# Patient Record
Sex: FEMALE | Race: WHITE | NOT HISPANIC OR LATINO | Employment: OTHER | URBAN - METROPOLITAN AREA
[De-identification: names, ages, dates, MRNs, and addresses within clinical notes are randomized per-mention and may not be internally consistent; named-entity substitution may affect disease eponyms.]

---

## 2017-02-04 ENCOUNTER — OFFICE VISIT - HEALTHEAST (OUTPATIENT)
Dept: FAMILY MEDICINE | Facility: CLINIC | Age: 67
End: 2017-02-04

## 2017-02-04 DIAGNOSIS — J01.90 ACUTE SINUSITIS: ICD-10-CM

## 2017-04-25 ENCOUNTER — COMMUNICATION - HEALTHEAST (OUTPATIENT)
Dept: INTERNAL MEDICINE | Facility: CLINIC | Age: 67
End: 2017-04-25

## 2017-05-22 ENCOUNTER — RECORDS - HEALTHEAST (OUTPATIENT)
Dept: ADMINISTRATIVE | Facility: OTHER | Age: 67
End: 2017-05-22

## 2017-05-30 ENCOUNTER — COMMUNICATION - HEALTHEAST (OUTPATIENT)
Dept: INTERNAL MEDICINE | Facility: CLINIC | Age: 67
End: 2017-05-30

## 2017-05-31 ENCOUNTER — RECORDS - HEALTHEAST (OUTPATIENT)
Dept: ADMINISTRATIVE | Facility: OTHER | Age: 67
End: 2017-05-31

## 2017-05-31 ENCOUNTER — OFFICE VISIT - HEALTHEAST (OUTPATIENT)
Dept: INTERNAL MEDICINE | Facility: CLINIC | Age: 67
End: 2017-05-31
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
## 2017-05-31 ASSESSMENT — MIFFLIN-ST. JEOR: SCORE: 970.87

## 2017-06-05 ENCOUNTER — AMBULATORY - HEALTHEAST (OUTPATIENT)
Dept: INTERNAL MEDICINE | Facility: CLINIC | Age: 67
End: 2017-06-05

## 2017-06-05 ENCOUNTER — COMMUNICATION - HEALTHEAST (OUTPATIENT)
Dept: SCHEDULING | Facility: CLINIC | Age: 67
End: 2017-06-05

## 2017-06-05 ENCOUNTER — COMMUNICATION - HEALTHEAST (OUTPATIENT)
Dept: INTERNAL MEDICINE | Facility: CLINIC | Age: 67
End: 2017-06-05

## 2017-07-03 ENCOUNTER — OFFICE VISIT - HEALTHEAST (OUTPATIENT)
Dept: ONCOLOGY | Facility: HOSPITAL | Age: 67
End: 2017-07-03

## 2017-07-03 DIAGNOSIS — C50.519 MALIGNANT NEOPLASM OF LOWER-OUTER QUADRANT OF FEMALE BREAST (H): ICD-10-CM

## 2017-07-03 RX ORDER — GLUCOSAMINE HCL/CHONDROITIN SU 500-400 MG
CAPSULE ORAL
Status: SHIPPED | COMMUNITY
Start: 2017-05-17

## 2017-08-02 ENCOUNTER — OFFICE VISIT - HEALTHEAST (OUTPATIENT)
Dept: INTERNAL MEDICINE | Facility: CLINIC | Age: 67
End: 2017-08-02

## 2017-08-02 DIAGNOSIS — L84 CALLUS OF FOOT: ICD-10-CM

## 2017-08-02 DIAGNOSIS — E10.9 DIABETES MELLITUS TYPE 1 (H): ICD-10-CM

## 2017-08-02 DIAGNOSIS — M79.601 BILATERAL ARM PAIN: ICD-10-CM

## 2017-08-02 DIAGNOSIS — M79.602 BILATERAL ARM PAIN: ICD-10-CM

## 2017-08-02 ASSESSMENT — MIFFLIN-ST. JEOR: SCORE: 975.41

## 2017-08-29 ENCOUNTER — OFFICE VISIT - HEALTHEAST (OUTPATIENT)
Dept: PODIATRY | Age: 67
End: 2017-08-29

## 2017-08-29 DIAGNOSIS — L57.0 KERATOMA: ICD-10-CM

## 2017-09-05 ENCOUNTER — RECORDS - HEALTHEAST (OUTPATIENT)
Dept: ADMINISTRATIVE | Facility: OTHER | Age: 67
End: 2017-09-05

## 2017-09-11 ENCOUNTER — RECORDS - HEALTHEAST (OUTPATIENT)
Dept: ADMINISTRATIVE | Facility: OTHER | Age: 67
End: 2017-09-11

## 2017-09-12 ENCOUNTER — COMMUNICATION - HEALTHEAST (OUTPATIENT)
Dept: INTERNAL MEDICINE | Facility: CLINIC | Age: 67
End: 2017-09-12

## 2017-09-12 ENCOUNTER — AMBULATORY - HEALTHEAST (OUTPATIENT)
Dept: CARDIOLOGY | Facility: CLINIC | Age: 67
End: 2017-09-12

## 2017-09-12 DIAGNOSIS — I25.10 CAD (CORONARY ARTERY DISEASE): ICD-10-CM

## 2017-09-27 ENCOUNTER — OFFICE VISIT - HEALTHEAST (OUTPATIENT)
Dept: INTERNAL MEDICINE | Facility: CLINIC | Age: 67
End: 2017-09-27

## 2017-09-27 DIAGNOSIS — I25.10 ATHEROSCLEROSIS OF NATIVE CORONARY ARTERY OF NATIVE HEART WITHOUT ANGINA PECTORIS: ICD-10-CM

## 2017-09-27 DIAGNOSIS — Z00.00 ROUTINE GENERAL MEDICAL EXAMINATION AT A HEALTH CARE FACILITY: ICD-10-CM

## 2017-09-27 DIAGNOSIS — C50.911 BREAST CANCER, RIGHT BREAST (H): ICD-10-CM

## 2017-09-27 DIAGNOSIS — Z23 NEED FOR VACCINATION: ICD-10-CM

## 2017-09-27 DIAGNOSIS — E11.9 DIABETES MELLITUS TYPE 2 IN NONOBESE (H): ICD-10-CM

## 2017-09-27 DIAGNOSIS — E10.9 TYPE 1 DIABETES MELLITUS WITHOUT COMPLICATION (H): ICD-10-CM

## 2017-09-27 DIAGNOSIS — E78.2 MIXED HYPERLIPIDEMIA: ICD-10-CM

## 2017-09-27 DIAGNOSIS — Z79.899 MEDICATION MANAGEMENT: ICD-10-CM

## 2017-09-27 DIAGNOSIS — I10 ESSENTIAL HYPERTENSION WITH GOAL BLOOD PRESSURE LESS THAN 130/85: ICD-10-CM

## 2017-09-27 DIAGNOSIS — M85.80 OSTEOPENIA: ICD-10-CM

## 2017-09-27 LAB
CHOLEST SERPL-MCNC: 143 MG/DL
FASTING STATUS PATIENT QL REPORTED: YES
HDLC SERPL-MCNC: 80 MG/DL
LDLC SERPL CALC-MCNC: 57 MG/DL
TRIGL SERPL-MCNC: 32 MG/DL

## 2017-09-27 ASSESSMENT — MIFFLIN-ST. JEOR: SCORE: 975.41

## 2017-10-02 ENCOUNTER — OFFICE VISIT - HEALTHEAST (OUTPATIENT)
Dept: PODIATRY | Facility: CLINIC | Age: 67
End: 2017-10-02

## 2017-10-02 DIAGNOSIS — L57.0 KERATOMA: ICD-10-CM

## 2017-10-02 DIAGNOSIS — M21.6X1 PLANTAR FLEXED METATARSAL BONE OF RIGHT FOOT: ICD-10-CM

## 2017-10-06 ENCOUNTER — COMMUNICATION - HEALTHEAST (OUTPATIENT)
Dept: ADMINISTRATIVE | Facility: CLINIC | Age: 67
End: 2017-10-06

## 2017-10-10 ENCOUNTER — AMBULATORY - HEALTHEAST (OUTPATIENT)
Dept: OTHER | Facility: CLINIC | Age: 67
End: 2017-10-10

## 2017-10-25 ENCOUNTER — AMBULATORY - HEALTHEAST (OUTPATIENT)
Dept: OTHER | Facility: CLINIC | Age: 67
End: 2017-10-25

## 2017-11-02 ENCOUNTER — HOSPITAL ENCOUNTER (OUTPATIENT)
Dept: NUCLEAR MEDICINE | Facility: HOSPITAL | Age: 67
Discharge: HOME | End: 2017-11-02
Attending: INTERNAL MEDICINE

## 2017-11-02 ENCOUNTER — HOSPITAL ENCOUNTER (OUTPATIENT)
Dept: CARDIOLOGY | Facility: HOSPITAL | Age: 67
Discharge: HOME | End: 2017-11-02
Attending: INTERNAL MEDICINE

## 2017-11-02 DIAGNOSIS — I25.10 CAD (CORONARY ARTERY DISEASE): ICD-10-CM

## 2017-11-02 LAB
CV STRESS CURRENT BP HE: NORMAL
CV STRESS CURRENT HR HE: 101
CV STRESS CURRENT HR HE: 103
CV STRESS CURRENT HR HE: 105
CV STRESS CURRENT HR HE: 105
CV STRESS CURRENT HR HE: 110
CV STRESS CURRENT HR HE: 119
CV STRESS CURRENT HR HE: 122
CV STRESS CURRENT HR HE: 128
CV STRESS CURRENT HR HE: 132
CV STRESS CURRENT HR HE: 133
CV STRESS CURRENT HR HE: 135
CV STRESS CURRENT HR HE: 140
CV STRESS CURRENT HR HE: 142
CV STRESS CURRENT HR HE: 145
CV STRESS CURRENT HR HE: 146
CV STRESS CURRENT HR HE: 60
CV STRESS CURRENT HR HE: 63
CV STRESS CURRENT HR HE: 73
CV STRESS CURRENT HR HE: 74
CV STRESS CURRENT HR HE: 75
CV STRESS CURRENT HR HE: 77
CV STRESS CURRENT HR HE: 78
CV STRESS CURRENT HR HE: 79
CV STRESS CURRENT HR HE: 82
CV STRESS CURRENT HR HE: 85
CV STRESS CURRENT HR HE: 91
CV STRESS CURRENT HR HE: 94
CV STRESS DEVIATION TIME HE: NORMAL
CV STRESS ECHO PERCENT HR HE: NORMAL
CV STRESS EXERCISE STAGE HE: NORMAL
CV STRESS EXERCISE STAGE REACHED HE: NORMAL
CV STRESS FINAL RESTING BP HE: NORMAL
CV STRESS FINAL RESTING HR HE: 73
CV STRESS MAX HR HE: 147
CV STRESS MAX TREADMILL GRADE HE: 16
CV STRESS MAX TREADMILL SPEED HE: 4.2
CV STRESS PEAK DIA BP HE: NORMAL
CV STRESS PEAK SYS BP HE: NORMAL
CV STRESS PHASE HE: NORMAL
CV STRESS PROTOCOL HE: NORMAL
CV STRESS RESTING PT POSITION HE: NORMAL
CV STRESS RESTING PT POSITION HE: NORMAL
CV STRESS ST DEVIATION AMOUNT HE: NORMAL
CV STRESS ST DEVIATION ELEVATION HE: NORMAL
CV STRESS ST EVELATION AMOUNT HE: NORMAL
CV STRESS TEST TYPE HE: NORMAL
CV STRESS TOTAL STAGE TIME MIN 1 HE: NORMAL
NUC STRESS EJECTION FRACTION: 75 %
STRESS ECHO BASELINE BP: NORMAL
STRESS ECHO BASELINE HR: 59
STRESS ECHO CALCULATED PERCENT HR: 94 %
STRESS ECHO LAST STRESS BP: NORMAL
STRESS ECHO LAST STRESS HR: 146
STRESS ECHO POST ESTIMATED WORKLOAD: 12.1
STRESS ECHO POST EXERCISE DUR MIN: 11
STRESS ECHO POST EXERCISE DUR SEC: 20
STRESS ECHO TARGET HR: 133

## 2017-11-09 ENCOUNTER — OFFICE VISIT - HEALTHEAST (OUTPATIENT)
Dept: CARDIOLOGY | Facility: CLINIC | Age: 67
End: 2017-11-09

## 2017-11-09 DIAGNOSIS — I10 ESSENTIAL HYPERTENSION: ICD-10-CM

## 2017-11-09 DIAGNOSIS — I25.10 ATHEROSCLEROSIS OF NATIVE CORONARY ARTERY OF NATIVE HEART WITHOUT ANGINA PECTORIS: ICD-10-CM

## 2017-11-09 ASSESSMENT — MIFFLIN-ST. JEOR: SCORE: 975.41

## 2017-11-22 ENCOUNTER — COMMUNICATION - HEALTHEAST (OUTPATIENT)
Dept: CARDIOLOGY | Facility: CLINIC | Age: 67
End: 2017-11-22

## 2017-12-01 ENCOUNTER — COMMUNICATION - HEALTHEAST (OUTPATIENT)
Dept: INTERNAL MEDICINE | Facility: CLINIC | Age: 67
End: 2017-12-01

## 2017-12-01 DIAGNOSIS — M85.80 OSTEOPENIA: ICD-10-CM

## 2017-12-05 ENCOUNTER — COMMUNICATION - HEALTHEAST (OUTPATIENT)
Dept: INTERNAL MEDICINE | Facility: CLINIC | Age: 67
End: 2017-12-05

## 2017-12-05 DIAGNOSIS — I10 ESSENTIAL HYPERTENSION: ICD-10-CM

## 2018-01-15 ENCOUNTER — OFFICE VISIT - HEALTHEAST (OUTPATIENT)
Dept: ONCOLOGY | Facility: HOSPITAL | Age: 68
End: 2018-01-15

## 2018-01-15 DIAGNOSIS — C50.519 MALIGNANT NEOPLASM OF LOWER-OUTER QUADRANT OF FEMALE BREAST (H): ICD-10-CM

## 2018-01-16 ENCOUNTER — RECORDS - HEALTHEAST (OUTPATIENT)
Dept: ADMINISTRATIVE | Facility: OTHER | Age: 68
End: 2018-01-16

## 2018-02-19 ENCOUNTER — COMMUNICATION - HEALTHEAST (OUTPATIENT)
Dept: INTERNAL MEDICINE | Facility: CLINIC | Age: 68
End: 2018-02-19

## 2018-02-19 DIAGNOSIS — M85.80 OSTEOPENIA: ICD-10-CM

## 2018-02-26 ENCOUNTER — COMMUNICATION - HEALTHEAST (OUTPATIENT)
Dept: INTERNAL MEDICINE | Facility: CLINIC | Age: 68
End: 2018-02-26

## 2018-05-14 ENCOUNTER — COMMUNICATION - HEALTHEAST (OUTPATIENT)
Dept: INTERNAL MEDICINE | Facility: CLINIC | Age: 68
End: 2018-05-14

## 2018-05-14 DIAGNOSIS — M85.80 OSTEOPENIA: ICD-10-CM

## 2018-05-16 ENCOUNTER — RECORDS - HEALTHEAST (OUTPATIENT)
Dept: ADMINISTRATIVE | Facility: OTHER | Age: 68
End: 2018-05-16

## 2018-06-28 ENCOUNTER — RECORDS - HEALTHEAST (OUTPATIENT)
Dept: ADMINISTRATIVE | Facility: OTHER | Age: 68
End: 2018-06-28

## 2018-06-28 ENCOUNTER — RECORDS - HEALTHEAST (OUTPATIENT)
Dept: BONE DENSITY | Facility: CLINIC | Age: 68
End: 2018-06-28

## 2018-06-28 DIAGNOSIS — C50.519: ICD-10-CM

## 2018-07-09 ENCOUNTER — OFFICE VISIT - HEALTHEAST (OUTPATIENT)
Dept: ONCOLOGY | Facility: HOSPITAL | Age: 68
End: 2018-07-09

## 2018-07-09 DIAGNOSIS — C50.519 MALIGNANT NEOPLASM OF LOWER-OUTER QUADRANT OF FEMALE BREAST (H): ICD-10-CM

## 2018-07-17 ENCOUNTER — COMMUNICATION - HEALTHEAST (OUTPATIENT)
Dept: ONCOLOGY | Facility: HOSPITAL | Age: 68
End: 2018-07-17

## 2018-07-17 ENCOUNTER — AMBULATORY - HEALTHEAST (OUTPATIENT)
Dept: ONCOLOGY | Facility: HOSPITAL | Age: 68
End: 2018-07-17

## 2018-07-17 DIAGNOSIS — M81.0 AGE-RELATED OSTEOPOROSIS WITHOUT CURRENT PATHOLOGICAL FRACTURE: ICD-10-CM

## 2018-07-19 ENCOUNTER — AMBULATORY - HEALTHEAST (OUTPATIENT)
Dept: INFUSION THERAPY | Facility: HOSPITAL | Age: 68
End: 2018-07-19

## 2018-07-19 ENCOUNTER — INFUSION - HEALTHEAST (OUTPATIENT)
Dept: INFUSION THERAPY | Facility: HOSPITAL | Age: 68
End: 2018-07-19

## 2018-07-19 DIAGNOSIS — C50.519 MALIGNANT NEOPLASM OF LOWER-OUTER QUADRANT OF FEMALE BREAST (H): ICD-10-CM

## 2018-07-19 DIAGNOSIS — M81.0 AGE-RELATED OSTEOPOROSIS WITHOUT CURRENT PATHOLOGICAL FRACTURE: ICD-10-CM

## 2018-07-19 LAB
ALBUMIN SERPL-MCNC: 4.1 G/DL (ref 3.5–5)
ALP SERPL-CCNC: 48 U/L (ref 45–120)
ALT SERPL W P-5'-P-CCNC: 18 U/L (ref 0–45)
ANION GAP SERPL CALCULATED.3IONS-SCNC: 7 MMOL/L (ref 5–18)
ANION GAP SERPL CALCULATED.3IONS-SCNC: 7 MMOL/L (ref 5–18)
AST SERPL W P-5'-P-CCNC: 30 U/L (ref 0–40)
BILIRUB SERPL-MCNC: 0.8 MG/DL (ref 0–1)
BUN SERPL-MCNC: 13 MG/DL (ref 8–22)
BUN SERPL-MCNC: 13 MG/DL (ref 8–22)
CALCIUM SERPL-MCNC: 9.7 MG/DL (ref 8.5–10.5)
CALCIUM SERPL-MCNC: 9.7 MG/DL (ref 8.5–10.5)
CHLORIDE BLD-SCNC: 102 MMOL/L (ref 98–107)
CHLORIDE BLD-SCNC: 102 MMOL/L (ref 98–107)
CO2 SERPL-SCNC: 31 MMOL/L (ref 22–31)
CO2 SERPL-SCNC: 31 MMOL/L (ref 22–31)
CREAT SERPL-MCNC: 0.7 MG/DL (ref 0.6–1.1)
CREAT SERPL-MCNC: 0.7 MG/DL (ref 0.6–1.1)
GFR SERPL CREATININE-BSD FRML MDRD: >60 ML/MIN/1.73M2
GFR SERPL CREATININE-BSD FRML MDRD: >60 ML/MIN/1.73M2
GLUCOSE BLD-MCNC: 86 MG/DL (ref 70–125)
GLUCOSE BLD-MCNC: 86 MG/DL (ref 70–125)
POTASSIUM BLD-SCNC: 3.9 MMOL/L (ref 3.5–5)
POTASSIUM BLD-SCNC: 3.9 MMOL/L (ref 3.5–5)
PROT SERPL-MCNC: 6.9 G/DL (ref 6–8)
SODIUM SERPL-SCNC: 140 MMOL/L (ref 136–145)
SODIUM SERPL-SCNC: 140 MMOL/L (ref 136–145)

## 2018-08-06 ENCOUNTER — COMMUNICATION - HEALTHEAST (OUTPATIENT)
Dept: INTERNAL MEDICINE | Facility: CLINIC | Age: 68
End: 2018-08-06

## 2018-08-06 DIAGNOSIS — M85.80 OSTEOPENIA: ICD-10-CM

## 2018-08-15 ENCOUNTER — RECORDS - HEALTHEAST (OUTPATIENT)
Dept: ADMINISTRATIVE | Facility: OTHER | Age: 68
End: 2018-08-15

## 2018-09-21 ENCOUNTER — AMBULATORY - HEALTHEAST (OUTPATIENT)
Dept: INTERNAL MEDICINE | Facility: CLINIC | Age: 68
End: 2018-09-21

## 2018-09-24 ENCOUNTER — OFFICE VISIT - HEALTHEAST (OUTPATIENT)
Dept: INTERNAL MEDICINE | Facility: CLINIC | Age: 68
End: 2018-09-24

## 2018-09-24 DIAGNOSIS — Z23 NEED FOR VACCINATION: ICD-10-CM

## 2018-09-24 DIAGNOSIS — Z95.5 S/P CORONARY ARTERY STENT PLACEMENT: ICD-10-CM

## 2018-09-24 DIAGNOSIS — M81.0 AGE-RELATED OSTEOPOROSIS WITHOUT CURRENT PATHOLOGICAL FRACTURE: ICD-10-CM

## 2018-09-24 DIAGNOSIS — I25.10 ATHEROSCLEROSIS OF NATIVE CORONARY ARTERY OF NATIVE HEART WITHOUT ANGINA PECTORIS: ICD-10-CM

## 2018-09-24 DIAGNOSIS — E10.9 CONTROLLED DIABETES MELLITUS TYPE 1 WITHOUT COMPLICATIONS (H): ICD-10-CM

## 2018-09-24 DIAGNOSIS — I10 ESSENTIAL HYPERTENSION: ICD-10-CM

## 2018-09-24 DIAGNOSIS — E78.2 MIXED HYPERLIPIDEMIA: ICD-10-CM

## 2018-09-24 DIAGNOSIS — Z79.899 MEDICATION MANAGEMENT: ICD-10-CM

## 2018-09-24 DIAGNOSIS — Z00.00 ROUTINE GENERAL MEDICAL EXAMINATION AT A HEALTH CARE FACILITY: ICD-10-CM

## 2018-09-24 LAB
ALBUMIN SERPL-MCNC: 4.3 G/DL (ref 3.5–5)
ALBUMIN UR-MCNC: NEGATIVE MG/DL
ALP SERPL-CCNC: 52 U/L (ref 45–120)
ALT SERPL W P-5'-P-CCNC: 22 U/L (ref 0–45)
ANION GAP SERPL CALCULATED.3IONS-SCNC: 11 MMOL/L (ref 5–18)
APPEARANCE UR: CLEAR
AST SERPL W P-5'-P-CCNC: 37 U/L (ref 0–40)
BASOPHILS # BLD AUTO: 0.1 THOU/UL (ref 0–0.2)
BASOPHILS NFR BLD AUTO: 1 % (ref 0–2)
BILIRUB SERPL-MCNC: 1.1 MG/DL (ref 0–1)
BILIRUB UR QL STRIP: NEGATIVE
BUN SERPL-MCNC: 14 MG/DL (ref 8–22)
CALCIUM SERPL-MCNC: 10.1 MG/DL (ref 8.5–10.5)
CHLORIDE BLD-SCNC: 99 MMOL/L (ref 98–107)
CHOLEST SERPL-MCNC: 160 MG/DL
CO2 SERPL-SCNC: 30 MMOL/L (ref 22–31)
COLOR UR AUTO: YELLOW
CREAT SERPL-MCNC: 0.7 MG/DL (ref 0.6–1.1)
CREAT UR-MCNC: 54.3 MG/DL
EOSINOPHIL # BLD AUTO: 0.2 THOU/UL (ref 0–0.4)
EOSINOPHIL NFR BLD AUTO: 3 % (ref 0–6)
ERYTHROCYTE [DISTWIDTH] IN BLOOD BY AUTOMATED COUNT: 11.4 % (ref 11–14.5)
FASTING STATUS PATIENT QL REPORTED: YES
GFR SERPL CREATININE-BSD FRML MDRD: >60 ML/MIN/1.73M2
GLUCOSE BLD-MCNC: 95 MG/DL (ref 70–125)
GLUCOSE UR STRIP-MCNC: NEGATIVE MG/DL
HBA1C MFR BLD: 6 % (ref 3.5–6)
HCT VFR BLD AUTO: 45.3 % (ref 35–47)
HDLC SERPL-MCNC: 85 MG/DL
HGB BLD-MCNC: 15.3 G/DL (ref 12–16)
HGB UR QL STRIP: NEGATIVE
KETONES UR STRIP-MCNC: NEGATIVE MG/DL
LDLC SERPL CALC-MCNC: 68 MG/DL
LEUKOCYTE ESTERASE UR QL STRIP: NEGATIVE
LYMPHOCYTES # BLD AUTO: 2.7 THOU/UL (ref 0.8–4.4)
LYMPHOCYTES NFR BLD AUTO: 37 % (ref 20–40)
MCH RBC QN AUTO: 32.4 PG (ref 27–34)
MCHC RBC AUTO-ENTMCNC: 33.7 G/DL (ref 32–36)
MCV RBC AUTO: 96 FL (ref 80–100)
MICROALBUMIN UR-MCNC: <0.5 MG/DL (ref 0–1.99)
MICROALBUMIN/CREAT UR: NORMAL MG/G
MONOCYTES # BLD AUTO: 0.6 THOU/UL (ref 0–0.9)
MONOCYTES NFR BLD AUTO: 8 % (ref 2–10)
NEUTROPHILS # BLD AUTO: 3.8 THOU/UL (ref 2–7.7)
NEUTROPHILS NFR BLD AUTO: 51 % (ref 50–70)
NITRATE UR QL: NEGATIVE
PH UR STRIP: 7.5 [PH] (ref 5–8)
PLATELET # BLD AUTO: 265 THOU/UL (ref 140–440)
PMV BLD AUTO: 6.3 FL (ref 7–10)
POTASSIUM BLD-SCNC: 4.8 MMOL/L (ref 3.5–5)
PROT SERPL-MCNC: 7.4 G/DL (ref 6–8)
RBC # BLD AUTO: 4.72 MILL/UL (ref 3.8–5.4)
SODIUM SERPL-SCNC: 140 MMOL/L (ref 136–145)
SP GR UR STRIP: 1.01 (ref 1–1.03)
TRIGL SERPL-MCNC: 33 MG/DL
TSH SERPL DL<=0.005 MIU/L-ACNC: 2.41 UIU/ML (ref 0.3–5)
UROBILINOGEN UR STRIP-ACNC: NORMAL
WBC: 7.4 THOU/UL (ref 4–11)

## 2018-09-24 ASSESSMENT — MIFFLIN-ST. JEOR: SCORE: 948.19

## 2018-09-25 ENCOUNTER — COMMUNICATION - HEALTHEAST (OUTPATIENT)
Dept: INTERNAL MEDICINE | Facility: CLINIC | Age: 68
End: 2018-09-25

## 2018-10-15 ENCOUNTER — COMMUNICATION - HEALTHEAST (OUTPATIENT)
Dept: ADMINISTRATIVE | Facility: CLINIC | Age: 68
End: 2018-10-15

## 2018-10-15 ENCOUNTER — AMBULATORY - HEALTHEAST (OUTPATIENT)
Dept: PODIATRY | Facility: CLINIC | Age: 68
End: 2018-10-15

## 2018-10-15 ENCOUNTER — COMMUNICATION - HEALTHEAST (OUTPATIENT)
Dept: PODIATRY | Facility: CLINIC | Age: 68
End: 2018-10-15

## 2018-10-15 DIAGNOSIS — M21.6X9: ICD-10-CM

## 2018-10-19 ENCOUNTER — COMMUNICATION - HEALTHEAST (OUTPATIENT)
Dept: OTHER | Facility: CLINIC | Age: 68
End: 2018-10-19

## 2018-10-23 ENCOUNTER — AMBULATORY - HEALTHEAST (OUTPATIENT)
Dept: OTHER | Facility: CLINIC | Age: 68
End: 2018-10-23

## 2018-10-26 ENCOUNTER — RECORDS - HEALTHEAST (OUTPATIENT)
Dept: ADMINISTRATIVE | Facility: OTHER | Age: 68
End: 2018-10-26

## 2018-11-07 ENCOUNTER — HOSPITAL ENCOUNTER (OUTPATIENT)
Dept: NUCLEAR MEDICINE | Facility: HOSPITAL | Age: 68
Discharge: HOME | End: 2018-11-07
Attending: INTERNAL MEDICINE

## 2018-11-07 ENCOUNTER — HOSPITAL ENCOUNTER (OUTPATIENT)
Dept: CARDIOLOGY | Facility: HOSPITAL | Age: 68
Discharge: HOME | End: 2018-11-07
Attending: INTERNAL MEDICINE

## 2018-11-07 DIAGNOSIS — I10 ESSENTIAL HYPERTENSION: ICD-10-CM

## 2018-11-07 DIAGNOSIS — I25.10 ATHEROSCLEROSIS OF NATIVE CORONARY ARTERY OF NATIVE HEART WITHOUT ANGINA PECTORIS: ICD-10-CM

## 2018-11-07 LAB
CV STRESS CURRENT BP HE: NORMAL
CV STRESS CURRENT HR HE: 101
CV STRESS CURRENT HR HE: 104
CV STRESS CURRENT HR HE: 105
CV STRESS CURRENT HR HE: 119
CV STRESS CURRENT HR HE: 120
CV STRESS CURRENT HR HE: 120
CV STRESS CURRENT HR HE: 124
CV STRESS CURRENT HR HE: 131
CV STRESS CURRENT HR HE: 132
CV STRESS CURRENT HR HE: 132
CV STRESS CURRENT HR HE: 134
CV STRESS CURRENT HR HE: 136
CV STRESS CURRENT HR HE: 139
CV STRESS CURRENT HR HE: 62
CV STRESS CURRENT HR HE: 72
CV STRESS CURRENT HR HE: 73
CV STRESS CURRENT HR HE: 73
CV STRESS CURRENT HR HE: 75
CV STRESS CURRENT HR HE: 84
CV STRESS CURRENT HR HE: 85
CV STRESS CURRENT HR HE: 86
CV STRESS CURRENT HR HE: 87
CV STRESS CURRENT HR HE: 89
CV STRESS CURRENT HR HE: 90
CV STRESS CURRENT HR HE: 91
CV STRESS CURRENT HR HE: 91
CV STRESS CURRENT HR HE: 95
CV STRESS CURRENT HR HE: 97
CV STRESS CURRENT HR HE: 98
CV STRESS DEVIATION TIME HE: NORMAL
CV STRESS ECHO PERCENT HR HE: NORMAL
CV STRESS EXERCISE STAGE HE: NORMAL
CV STRESS EXERCISE STAGE REACHED HE: NORMAL
CV STRESS FINAL RESTING BP HE: NORMAL
CV STRESS FINAL RESTING HR HE: 73
CV STRESS MAX HR HE: 139
CV STRESS MAX TREADMILL GRADE HE: 16
CV STRESS MAX TREADMILL SPEED HE: 4.2
CV STRESS PEAK DIA BP HE: NORMAL
CV STRESS PEAK SYS BP HE: NORMAL
CV STRESS PHASE HE: NORMAL
CV STRESS PROTOCOL HE: NORMAL
CV STRESS RESTING PT POSITION HE: NORMAL
CV STRESS RESTING PT POSITION HE: NORMAL
CV STRESS ST DEVIATION AMOUNT HE: NORMAL
CV STRESS ST DEVIATION ELEVATION HE: NORMAL
CV STRESS ST EVELATION AMOUNT HE: NORMAL
CV STRESS TEST TYPE HE: NORMAL
CV STRESS TOTAL STAGE TIME MIN 1 HE: NORMAL
NUC STRESS EJECTION FRACTION: 70 %
STRESS ECHO BASELINE BP: NORMAL
STRESS ECHO BASELINE HR: 63
STRESS ECHO CALCULATED PERCENT HR: 90 %
STRESS ECHO LAST STRESS BP: NORMAL
STRESS ECHO LAST STRESS HR: 139
STRESS ECHO POST ESTIMATED WORKLOAD: 12.1
STRESS ECHO POST EXERCISE DUR MIN: 10
STRESS ECHO POST EXERCISE DUR SEC: 37
STRESS ECHO TARGET HR: 132

## 2018-11-14 ENCOUNTER — OFFICE VISIT - HEALTHEAST (OUTPATIENT)
Dept: CARDIOLOGY | Facility: TELEHEALTH | Age: 68
End: 2018-11-14

## 2018-11-14 DIAGNOSIS — Z95.5 S/P CORONARY ARTERY STENT PLACEMENT: ICD-10-CM

## 2018-11-14 DIAGNOSIS — I25.118 ATHEROSCLEROTIC HEART DISEASE OF NATIVE CORONARY ARTERY WITH OTHER FORMS OF ANGINA PECTORIS (H): ICD-10-CM

## 2018-11-14 DIAGNOSIS — R07.2 PRECORDIAL PAIN: ICD-10-CM

## 2018-11-14 DIAGNOSIS — E10.9 DIABETES MELLITUS TYPE I, CONTROLLED (H): ICD-10-CM

## 2018-11-14 ASSESSMENT — MIFFLIN-ST. JEOR: SCORE: 957.26

## 2018-11-19 ENCOUNTER — RECORDS - HEALTHEAST (OUTPATIENT)
Dept: ADMINISTRATIVE | Facility: OTHER | Age: 68
End: 2018-11-19

## 2018-11-20 ENCOUNTER — AMBULATORY - HEALTHEAST (OUTPATIENT)
Dept: OTHER | Facility: CLINIC | Age: 68
End: 2018-11-20

## 2018-11-26 ENCOUNTER — AMBULATORY - HEALTHEAST (OUTPATIENT)
Dept: ONCOLOGY | Facility: HOSPITAL | Age: 68
End: 2018-11-26

## 2018-11-26 DIAGNOSIS — T50.995S ADVERSE EFFECT OF OTHER DRUGS, MEDICAMENTS AND BIOLOGICAL SUBSTANCES, SEQUELA: ICD-10-CM

## 2018-12-20 ENCOUNTER — RECORDS - HEALTHEAST (OUTPATIENT)
Dept: ADMINISTRATIVE | Facility: OTHER | Age: 68
End: 2018-12-20

## 2019-01-03 ENCOUNTER — RECORDS - HEALTHEAST (OUTPATIENT)
Dept: ADMINISTRATIVE | Facility: OTHER | Age: 69
End: 2019-01-03

## 2019-01-10 ENCOUNTER — INFUSION - HEALTHEAST (OUTPATIENT)
Dept: INFUSION THERAPY | Facility: HOSPITAL | Age: 69
End: 2019-01-10

## 2019-01-10 ENCOUNTER — AMBULATORY - HEALTHEAST (OUTPATIENT)
Dept: INFUSION THERAPY | Facility: HOSPITAL | Age: 69
End: 2019-01-10

## 2019-01-10 ENCOUNTER — OFFICE VISIT - HEALTHEAST (OUTPATIENT)
Dept: ONCOLOGY | Facility: HOSPITAL | Age: 69
End: 2019-01-10

## 2019-01-10 DIAGNOSIS — Z17.0 MALIGNANT NEOPLASM OF LOWER-OUTER QUADRANT OF BOTH BREASTS IN FEMALE, ESTROGEN RECEPTOR POSITIVE (H): ICD-10-CM

## 2019-01-10 DIAGNOSIS — T50.995S ADVERSE EFFECT OF OTHER DRUGS, MEDICAMENTS AND BIOLOGICAL SUBSTANCES, SEQUELA: ICD-10-CM

## 2019-01-10 DIAGNOSIS — C50.511 MALIGNANT NEOPLASM OF LOWER-OUTER QUADRANT OF BOTH BREASTS IN FEMALE, ESTROGEN RECEPTOR POSITIVE (H): ICD-10-CM

## 2019-01-10 DIAGNOSIS — C50.512 MALIGNANT NEOPLASM OF LOWER-OUTER QUADRANT OF BOTH BREASTS IN FEMALE, ESTROGEN RECEPTOR POSITIVE (H): ICD-10-CM

## 2019-01-10 DIAGNOSIS — M81.0 AGE-RELATED OSTEOPOROSIS WITHOUT CURRENT PATHOLOGICAL FRACTURE: ICD-10-CM

## 2019-01-10 LAB
ALBUMIN SERPL-MCNC: 4.3 G/DL (ref 3.5–5)
ALP SERPL-CCNC: 62 U/L (ref 45–120)
ALT SERPL W P-5'-P-CCNC: 28 U/L (ref 0–45)
ANION GAP SERPL CALCULATED.3IONS-SCNC: 9 MMOL/L (ref 5–18)
AST SERPL W P-5'-P-CCNC: 37 U/L (ref 0–40)
BILIRUB SERPL-MCNC: 0.9 MG/DL (ref 0–1)
BUN SERPL-MCNC: 17 MG/DL (ref 8–22)
CALCIUM SERPL-MCNC: 9.8 MG/DL (ref 8.5–10.5)
CHLORIDE BLD-SCNC: 98 MMOL/L (ref 98–107)
CO2 SERPL-SCNC: 31 MMOL/L (ref 22–31)
CREAT SERPL-MCNC: 0.73 MG/DL (ref 0.6–1.1)
GFR SERPL CREATININE-BSD FRML MDRD: >60 ML/MIN/1.73M2
GLUCOSE BLD-MCNC: 96 MG/DL (ref 70–125)
POTASSIUM BLD-SCNC: 3.6 MMOL/L (ref 3.5–5)
PROT SERPL-MCNC: 7 G/DL (ref 6–8)
SODIUM SERPL-SCNC: 138 MMOL/L (ref 136–145)

## 2019-01-14 ENCOUNTER — COMMUNICATION - HEALTHEAST (OUTPATIENT)
Dept: ONCOLOGY | Facility: HOSPITAL | Age: 69
End: 2019-01-14

## 2019-01-14 DIAGNOSIS — C50.519 MALIGNANT NEOPLASM OF LOWER-OUTER QUADRANT OF FEMALE BREAST (H): ICD-10-CM

## 2019-01-31 ENCOUNTER — OFFICE VISIT - HEALTHEAST (OUTPATIENT)
Dept: INTERNAL MEDICINE | Facility: CLINIC | Age: 69
End: 2019-01-31

## 2019-01-31 ENCOUNTER — COMMUNICATION - HEALTHEAST (OUTPATIENT)
Dept: INTERNAL MEDICINE | Facility: CLINIC | Age: 69
End: 2019-01-31

## 2019-01-31 ENCOUNTER — RECORDS - HEALTHEAST (OUTPATIENT)
Dept: GENERAL RADIOLOGY | Facility: CLINIC | Age: 69
End: 2019-01-31

## 2019-01-31 DIAGNOSIS — M54.50 LOW BACK PAIN: ICD-10-CM

## 2019-01-31 DIAGNOSIS — E78.2 MIXED HYPERLIPIDEMIA: ICD-10-CM

## 2019-01-31 DIAGNOSIS — M54.50 ACUTE BILATERAL LOW BACK PAIN WITHOUT SCIATICA: ICD-10-CM

## 2019-01-31 DIAGNOSIS — I10 ESSENTIAL HYPERTENSION: ICD-10-CM

## 2019-01-31 ASSESSMENT — MIFFLIN-ST. JEOR: SCORE: 966.33

## 2019-03-01 ENCOUNTER — RECORDS - HEALTHEAST (OUTPATIENT)
Dept: ADMINISTRATIVE | Facility: OTHER | Age: 69
End: 2019-03-01

## 2019-03-01 LAB — HBA1C MFR BLD: 5.8 % (ref 0–5.6)

## 2019-03-02 ENCOUNTER — RECORDS - HEALTHEAST (OUTPATIENT)
Dept: ADMINISTRATIVE | Facility: OTHER | Age: 69
End: 2019-03-02

## 2019-03-14 ENCOUNTER — RECORDS - HEALTHEAST (OUTPATIENT)
Dept: HEALTH INFORMATION MANAGEMENT | Facility: CLINIC | Age: 69
End: 2019-03-14

## 2019-03-25 ENCOUNTER — RECORDS - HEALTHEAST (OUTPATIENT)
Dept: ADMINISTRATIVE | Facility: OTHER | Age: 69
End: 2019-03-25

## 2019-05-23 ENCOUNTER — OFFICE VISIT - HEALTHEAST (OUTPATIENT)
Dept: INTERNAL MEDICINE | Facility: CLINIC | Age: 69
End: 2019-05-23

## 2019-05-23 DIAGNOSIS — A69.20 LYME DISEASE: ICD-10-CM

## 2019-05-23 ASSESSMENT — MIFFLIN-ST. JEOR: SCORE: 966.33

## 2019-06-03 ENCOUNTER — RECORDS - HEALTHEAST (OUTPATIENT)
Dept: ADMINISTRATIVE | Facility: OTHER | Age: 69
End: 2019-06-03

## 2019-06-03 LAB — HBA1C MFR BLD: 6.1 % (ref 0–5.6)

## 2019-06-10 ENCOUNTER — RECORDS - HEALTHEAST (OUTPATIENT)
Dept: HEALTH INFORMATION MANAGEMENT | Facility: CLINIC | Age: 69
End: 2019-06-10

## 2019-07-08 ENCOUNTER — COMMUNICATION - HEALTHEAST (OUTPATIENT)
Dept: INTERNAL MEDICINE | Facility: CLINIC | Age: 69
End: 2019-07-08

## 2019-07-08 DIAGNOSIS — Z91.89 RISK OF EXPOSURE TO LYME DISEASE: ICD-10-CM

## 2019-07-09 ENCOUNTER — AMBULATORY - HEALTHEAST (OUTPATIENT)
Dept: INFUSION THERAPY | Facility: HOSPITAL | Age: 69
End: 2019-07-09

## 2019-07-09 ENCOUNTER — OFFICE VISIT - HEALTHEAST (OUTPATIENT)
Dept: ONCOLOGY | Facility: HOSPITAL | Age: 69
End: 2019-07-09

## 2019-07-09 DIAGNOSIS — C50.511 MALIGNANT NEOPLASM OF LOWER-OUTER QUADRANT OF BOTH BREASTS IN FEMALE, ESTROGEN RECEPTOR POSITIVE (H): ICD-10-CM

## 2019-07-09 DIAGNOSIS — M81.0 AGE-RELATED OSTEOPOROSIS WITHOUT CURRENT PATHOLOGICAL FRACTURE: ICD-10-CM

## 2019-07-09 DIAGNOSIS — Z17.0 MALIGNANT NEOPLASM OF LOWER-OUTER QUADRANT OF BOTH BREASTS IN FEMALE, ESTROGEN RECEPTOR POSITIVE (H): ICD-10-CM

## 2019-07-09 DIAGNOSIS — C50.512 MALIGNANT NEOPLASM OF LOWER-OUTER QUADRANT OF BOTH BREASTS IN FEMALE, ESTROGEN RECEPTOR POSITIVE (H): ICD-10-CM

## 2019-07-09 DIAGNOSIS — T50.995S ADVERSE EFFECT OF OTHER DRUGS, MEDICAMENTS AND BIOLOGICAL SUBSTANCES, SEQUELA: ICD-10-CM

## 2019-07-09 LAB
ALBUMIN SERPL-MCNC: 4.3 G/DL (ref 3.5–5)
ALP SERPL-CCNC: 50 U/L (ref 45–120)
ALT SERPL W P-5'-P-CCNC: 21 U/L (ref 0–45)
ANION GAP SERPL CALCULATED.3IONS-SCNC: 6 MMOL/L (ref 5–18)
AST SERPL W P-5'-P-CCNC: 32 U/L (ref 0–40)
BILIRUB SERPL-MCNC: 0.8 MG/DL (ref 0–1)
BUN SERPL-MCNC: 10 MG/DL (ref 8–22)
CALCIUM SERPL-MCNC: 9.9 MG/DL (ref 8.5–10.5)
CHLORIDE BLD-SCNC: 99 MMOL/L (ref 98–107)
CO2 SERPL-SCNC: 32 MMOL/L (ref 22–31)
CREAT SERPL-MCNC: 0.73 MG/DL (ref 0.6–1.1)
GFR SERPL CREATININE-BSD FRML MDRD: >60 ML/MIN/1.73M2
GLUCOSE BLD-MCNC: 104 MG/DL (ref 70–125)
POTASSIUM BLD-SCNC: 3.9 MMOL/L (ref 3.5–5)
PROT SERPL-MCNC: 7.2 G/DL (ref 6–8)
SODIUM SERPL-SCNC: 137 MMOL/L (ref 136–145)

## 2019-07-11 ENCOUNTER — COMMUNICATION - HEALTHEAST (OUTPATIENT)
Dept: ONCOLOGY | Facility: HOSPITAL | Age: 69
End: 2019-07-11

## 2019-07-12 ENCOUNTER — COMMUNICATION - HEALTHEAST (OUTPATIENT)
Dept: INTERNAL MEDICINE | Facility: CLINIC | Age: 69
End: 2019-07-12

## 2019-07-22 ENCOUNTER — COMMUNICATION - HEALTHEAST (OUTPATIENT)
Dept: ONCOLOGY | Facility: HOSPITAL | Age: 69
End: 2019-07-22

## 2019-07-22 ENCOUNTER — OFFICE VISIT - HEALTHEAST (OUTPATIENT)
Dept: INTERNAL MEDICINE | Facility: CLINIC | Age: 69
End: 2019-07-22

## 2019-07-22 DIAGNOSIS — C50.511 MALIGNANT NEOPLASM OF LOWER-OUTER QUADRANT OF BOTH BREASTS IN FEMALE, ESTROGEN RECEPTOR POSITIVE (H): ICD-10-CM

## 2019-07-22 DIAGNOSIS — M85.851 OSTEOPENIA OF NECKS OF BOTH FEMURS: ICD-10-CM

## 2019-07-22 DIAGNOSIS — M85.852 OSTEOPENIA OF NECKS OF BOTH FEMURS: ICD-10-CM

## 2019-07-22 DIAGNOSIS — Z17.0 MALIGNANT NEOPLASM OF LOWER-OUTER QUADRANT OF BOTH BREASTS IN FEMALE, ESTROGEN RECEPTOR POSITIVE (H): ICD-10-CM

## 2019-07-22 DIAGNOSIS — C50.512 MALIGNANT NEOPLASM OF LOWER-OUTER QUADRANT OF BOTH BREASTS IN FEMALE, ESTROGEN RECEPTOR POSITIVE (H): ICD-10-CM

## 2019-07-22 DIAGNOSIS — C50.911 BREAST CANCER, RIGHT BREAST (H): ICD-10-CM

## 2019-07-22 DIAGNOSIS — Z92.21 PRIOR AROMATASE INHIBITOR THERAPY: ICD-10-CM

## 2019-07-22 ASSESSMENT — MIFFLIN-ST. JEOR: SCORE: 952.73

## 2019-07-23 ENCOUNTER — INFUSION - HEALTHEAST (OUTPATIENT)
Dept: INFUSION THERAPY | Facility: HOSPITAL | Age: 69
End: 2019-07-23

## 2019-07-23 DIAGNOSIS — M81.0 AGE-RELATED OSTEOPOROSIS WITHOUT CURRENT PATHOLOGICAL FRACTURE: ICD-10-CM

## 2019-07-23 DIAGNOSIS — T50.995S ADVERSE EFFECT OF OTHER DRUGS, MEDICAMENTS AND BIOLOGICAL SUBSTANCES, SEQUELA: ICD-10-CM

## 2019-08-29 ENCOUNTER — RECORDS - HEALTHEAST (OUTPATIENT)
Dept: ADMINISTRATIVE | Facility: OTHER | Age: 69
End: 2019-08-29

## 2019-10-14 ENCOUNTER — COMMUNICATION - HEALTHEAST (OUTPATIENT)
Dept: ONCOLOGY | Facility: HOSPITAL | Age: 69
End: 2019-10-14

## 2019-10-14 ENCOUNTER — OFFICE VISIT - HEALTHEAST (OUTPATIENT)
Dept: INTERNAL MEDICINE | Facility: CLINIC | Age: 69
End: 2019-10-14

## 2019-10-14 ENCOUNTER — AMBULATORY - HEALTHEAST (OUTPATIENT)
Dept: INTERNAL MEDICINE | Facility: CLINIC | Age: 69
End: 2019-10-14

## 2019-10-14 ENCOUNTER — COMMUNICATION - HEALTHEAST (OUTPATIENT)
Dept: INTERNAL MEDICINE | Facility: CLINIC | Age: 69
End: 2019-10-14

## 2019-10-14 DIAGNOSIS — Z23 NEED FOR VACCINATION: ICD-10-CM

## 2019-10-14 DIAGNOSIS — E10.9 TYPE 1 DIABETES MELLITUS WITHOUT COMPLICATION (H): ICD-10-CM

## 2019-10-14 DIAGNOSIS — E78.2 MIXED HYPERLIPIDEMIA: ICD-10-CM

## 2019-10-14 DIAGNOSIS — Z95.5 S/P CORONARY ARTERY STENT PLACEMENT: ICD-10-CM

## 2019-10-14 DIAGNOSIS — Z00.00 ROUTINE GENERAL MEDICAL EXAMINATION AT A HEALTH CARE FACILITY: ICD-10-CM

## 2019-10-14 DIAGNOSIS — C50.512 MALIGNANT NEOPLASM OF LOWER-OUTER QUADRANT OF BOTH BREASTS IN FEMALE, ESTROGEN RECEPTOR POSITIVE (H): ICD-10-CM

## 2019-10-14 DIAGNOSIS — C50.511 MALIGNANT NEOPLASM OF LOWER-OUTER QUADRANT OF BOTH BREASTS IN FEMALE, ESTROGEN RECEPTOR POSITIVE (H): ICD-10-CM

## 2019-10-14 DIAGNOSIS — I25.10 ATHEROSCLEROSIS OF NATIVE CORONARY ARTERY OF NATIVE HEART WITHOUT ANGINA PECTORIS: ICD-10-CM

## 2019-10-14 DIAGNOSIS — Z17.0 MALIGNANT NEOPLASM OF LOWER-OUTER QUADRANT OF BOTH BREASTS IN FEMALE, ESTROGEN RECEPTOR POSITIVE (H): ICD-10-CM

## 2019-10-14 DIAGNOSIS — Z79.899 MEDICATION MANAGEMENT: ICD-10-CM

## 2019-10-14 DIAGNOSIS — Z11.59 ENCOUNTER FOR HEPATITIS C SCREENING TEST FOR LOW RISK PATIENT: ICD-10-CM

## 2019-10-14 DIAGNOSIS — Z23 FLU VACCINE NEED: ICD-10-CM

## 2019-10-14 DIAGNOSIS — M81.0 AGE-RELATED OSTEOPOROSIS WITHOUT CURRENT PATHOLOGICAL FRACTURE: ICD-10-CM

## 2019-10-14 DIAGNOSIS — I10 ESSENTIAL HYPERTENSION: ICD-10-CM

## 2019-10-14 LAB
25(OH)D3 SERPL-MCNC: 53.4 NG/ML (ref 30–80)
ALBUMIN SERPL-MCNC: 4.4 G/DL (ref 3.5–5)
ALBUMIN UR-MCNC: NEGATIVE MG/DL
ALP SERPL-CCNC: 55 U/L (ref 45–120)
ALT SERPL W P-5'-P-CCNC: 21 U/L (ref 0–45)
ANION GAP SERPL CALCULATED.3IONS-SCNC: 11 MMOL/L (ref 5–18)
APPEARANCE UR: CLEAR
AST SERPL W P-5'-P-CCNC: 31 U/L (ref 0–40)
BASOPHILS # BLD AUTO: 0.1 THOU/UL (ref 0–0.2)
BASOPHILS NFR BLD AUTO: 1 % (ref 0–2)
BILIRUB SERPL-MCNC: 0.9 MG/DL (ref 0–1)
BILIRUB UR QL STRIP: NEGATIVE
BUN SERPL-MCNC: 14 MG/DL (ref 8–22)
CALCIUM SERPL-MCNC: 10.2 MG/DL (ref 8.5–10.5)
CHLORIDE BLD-SCNC: 100 MMOL/L (ref 98–107)
CHOLEST SERPL-MCNC: 144 MG/DL
CO2 SERPL-SCNC: 28 MMOL/L (ref 22–31)
COLOR UR AUTO: YELLOW
CREAT SERPL-MCNC: 0.75 MG/DL (ref 0.6–1.1)
EOSINOPHIL # BLD AUTO: 0.2 THOU/UL (ref 0–0.4)
EOSINOPHIL NFR BLD AUTO: 2 % (ref 0–6)
ERYTHROCYTE [DISTWIDTH] IN BLOOD BY AUTOMATED COUNT: 12.1 % (ref 11–14.5)
FASTING STATUS PATIENT QL REPORTED: YES
GFR SERPL CREATININE-BSD FRML MDRD: >60 ML/MIN/1.73M2
GLUCOSE BLD-MCNC: 121 MG/DL (ref 70–125)
GLUCOSE UR STRIP-MCNC: NEGATIVE MG/DL
HCT VFR BLD AUTO: 45.2 % (ref 35–47)
HCV AB SERPL QL IA: NEGATIVE
HDLC SERPL-MCNC: 83 MG/DL
HGB BLD-MCNC: 15.2 G/DL (ref 12–16)
HGB UR QL STRIP: NEGATIVE
KETONES UR STRIP-MCNC: NEGATIVE MG/DL
LDLC SERPL CALC-MCNC: 53 MG/DL
LEUKOCYTE ESTERASE UR QL STRIP: NEGATIVE
LYMPHOCYTES # BLD AUTO: 2.7 THOU/UL (ref 0.8–4.4)
LYMPHOCYTES NFR BLD AUTO: 31 % (ref 20–40)
MCH RBC QN AUTO: 31.7 PG (ref 27–34)
MCHC RBC AUTO-ENTMCNC: 33.6 G/DL (ref 32–36)
MCV RBC AUTO: 94 FL (ref 80–100)
MONOCYTES # BLD AUTO: 0.6 THOU/UL (ref 0–0.9)
MONOCYTES NFR BLD AUTO: 6 % (ref 2–10)
NEUTROPHILS # BLD AUTO: 5.3 THOU/UL (ref 2–7.7)
NEUTROPHILS NFR BLD AUTO: 60 % (ref 50–70)
NITRATE UR QL: NEGATIVE
PH UR STRIP: 6 [PH] (ref 5–8)
PLATELET # BLD AUTO: 294 THOU/UL (ref 140–440)
PMV BLD AUTO: 6.5 FL (ref 7–10)
POTASSIUM BLD-SCNC: 4.7 MMOL/L (ref 3.5–5)
PROT SERPL-MCNC: 7.3 G/DL (ref 6–8)
RBC # BLD AUTO: 4.79 MILL/UL (ref 3.8–5.4)
SODIUM SERPL-SCNC: 139 MMOL/L (ref 136–145)
SP GR UR STRIP: <=1.005 (ref 1–1.03)
TRIGL SERPL-MCNC: 41 MG/DL
TSH SERPL DL<=0.005 MIU/L-ACNC: 2.07 UIU/ML (ref 0.3–5)
UROBILINOGEN UR STRIP-ACNC: NORMAL
WBC: 8.8 THOU/UL (ref 4–11)

## 2019-10-14 ASSESSMENT — MIFFLIN-ST. JEOR: SCORE: 966.33

## 2019-10-14 ASSESSMENT — PATIENT HEALTH QUESTIONNAIRE - PHQ9: SUM OF ALL RESPONSES TO PHQ QUESTIONS 1-9: 8

## 2019-10-22 ENCOUNTER — RECORDS - HEALTHEAST (OUTPATIENT)
Dept: ADMINISTRATIVE | Facility: OTHER | Age: 69
End: 2019-10-22

## 2019-10-29 ENCOUNTER — COMMUNICATION - HEALTHEAST (OUTPATIENT)
Dept: ONCOLOGY | Facility: HOSPITAL | Age: 69
End: 2019-10-29

## 2019-10-29 DIAGNOSIS — Z92.21 PRIOR AROMATASE INHIBITOR THERAPY: ICD-10-CM

## 2019-10-29 DIAGNOSIS — C50.911 BREAST CANCER, RIGHT BREAST (H): ICD-10-CM

## 2019-11-25 ENCOUNTER — COMMUNICATION - HEALTHEAST (OUTPATIENT)
Dept: ONCOLOGY | Facility: HOSPITAL | Age: 69
End: 2019-11-25

## 2019-11-25 DIAGNOSIS — Z92.21 PRIOR AROMATASE INHIBITOR THERAPY: ICD-10-CM

## 2019-11-25 DIAGNOSIS — C50.911 BREAST CANCER, RIGHT BREAST (H): ICD-10-CM

## 2019-12-03 LAB — HBA1C MFR BLD: 5.2 % (ref 0–5.6)

## 2019-12-04 ENCOUNTER — RECORDS - HEALTHEAST (OUTPATIENT)
Dept: ADMINISTRATIVE | Facility: OTHER | Age: 69
End: 2019-12-04

## 2019-12-11 ENCOUNTER — OFFICE VISIT - HEALTHEAST (OUTPATIENT)
Dept: CARDIOLOGY | Facility: TELEHEALTH | Age: 69
End: 2019-12-11

## 2019-12-11 DIAGNOSIS — E10.8 CONTROLLED DIABETES MELLITUS TYPE 1 WITH COMPLICATIONS (H): ICD-10-CM

## 2019-12-11 DIAGNOSIS — I25.10 ATHEROSCLEROSIS OF NATIVE CORONARY ARTERY OF NATIVE HEART WITHOUT ANGINA PECTORIS: ICD-10-CM

## 2019-12-11 DIAGNOSIS — I10 ESSENTIAL HYPERTENSION: ICD-10-CM

## 2019-12-11 DIAGNOSIS — Z96.41 INSULIN PUMP IN PLACE: ICD-10-CM

## 2019-12-11 DIAGNOSIS — Z95.5 S/P CORONARY ARTERY STENT PLACEMENT: ICD-10-CM

## 2019-12-11 DIAGNOSIS — E78.2 MIXED HYPERLIPIDEMIA: ICD-10-CM

## 2019-12-11 ASSESSMENT — MIFFLIN-ST. JEOR: SCORE: 959.98

## 2019-12-12 ENCOUNTER — RECORDS - HEALTHEAST (OUTPATIENT)
Dept: HEALTH INFORMATION MANAGEMENT | Facility: CLINIC | Age: 69
End: 2019-12-12

## 2020-01-23 ENCOUNTER — AMBULATORY - HEALTHEAST (OUTPATIENT)
Dept: INFUSION THERAPY | Facility: HOSPITAL | Age: 70
End: 2020-01-23

## 2020-01-23 ENCOUNTER — INFUSION - HEALTHEAST (OUTPATIENT)
Dept: INFUSION THERAPY | Facility: HOSPITAL | Age: 70
End: 2020-01-23

## 2020-01-23 ENCOUNTER — OFFICE VISIT - HEALTHEAST (OUTPATIENT)
Dept: ONCOLOGY | Facility: HOSPITAL | Age: 70
End: 2020-01-23

## 2020-01-23 DIAGNOSIS — M81.0 AGE-RELATED OSTEOPOROSIS WITHOUT CURRENT PATHOLOGICAL FRACTURE: ICD-10-CM

## 2020-01-23 DIAGNOSIS — T50.995S ADVERSE EFFECT OF OTHER DRUGS, MEDICAMENTS AND BIOLOGICAL SUBSTANCES, SEQUELA: ICD-10-CM

## 2020-01-23 DIAGNOSIS — E78.2 MIXED HYPERLIPIDEMIA: ICD-10-CM

## 2020-01-23 LAB
ALBUMIN SERPL-MCNC: 4.2 G/DL (ref 3.5–5)
ALP SERPL-CCNC: 58 U/L (ref 45–120)
ALT SERPL W P-5'-P-CCNC: 20 U/L (ref 0–45)
ANION GAP SERPL CALCULATED.3IONS-SCNC: 10 MMOL/L (ref 5–18)
AST SERPL W P-5'-P-CCNC: 33 U/L (ref 0–40)
BILIRUB SERPL-MCNC: 1 MG/DL (ref 0–1)
BUN SERPL-MCNC: 13 MG/DL (ref 8–22)
CALCIUM SERPL-MCNC: 9.7 MG/DL (ref 8.5–10.5)
CHLORIDE BLD-SCNC: 98 MMOL/L (ref 98–107)
CHOLEST SERPL-MCNC: 138 MG/DL
CO2 SERPL-SCNC: 29 MMOL/L (ref 22–31)
CREAT SERPL-MCNC: 0.71 MG/DL (ref 0.6–1.1)
FASTING STATUS PATIENT QL REPORTED: NORMAL
GFR SERPL CREATININE-BSD FRML MDRD: >60 ML/MIN/1.73M2
GLUCOSE BLD-MCNC: 98 MG/DL (ref 70–125)
HDLC SERPL-MCNC: 70 MG/DL
LDLC SERPL CALC-MCNC: 61 MG/DL
POTASSIUM BLD-SCNC: 3.6 MMOL/L (ref 3.5–5)
PROT SERPL-MCNC: 7.1 G/DL (ref 6–8)
SODIUM SERPL-SCNC: 137 MMOL/L (ref 136–145)
TRIGL SERPL-MCNC: 34 MG/DL

## 2020-01-24 ENCOUNTER — COMMUNICATION - HEALTHEAST (OUTPATIENT)
Dept: INTERNAL MEDICINE | Facility: CLINIC | Age: 70
End: 2020-01-24

## 2020-01-24 DIAGNOSIS — I10 ESSENTIAL HYPERTENSION: ICD-10-CM

## 2020-01-24 DIAGNOSIS — E78.2 MIXED HYPERLIPIDEMIA: ICD-10-CM

## 2020-03-16 ENCOUNTER — OFFICE VISIT - HEALTHEAST (OUTPATIENT)
Dept: FAMILY MEDICINE | Facility: CLINIC | Age: 70
End: 2020-03-16

## 2020-03-16 ENCOUNTER — VIRTUAL VISIT (OUTPATIENT)
Dept: FAMILY MEDICINE | Facility: OTHER | Age: 70
End: 2020-03-16

## 2020-03-16 DIAGNOSIS — R05.9 COUGH: ICD-10-CM

## 2020-03-16 NOTE — PROGRESS NOTES
"Date: 2020 10:56:50  Clinician: Gabriella Velazquez  Clinician NPI: [id number]  Patient: Sammi Guadalupe  Patient : 1950  Patient Address: [address] [place] [address]  Patient Phone: [phone]  Visit Protocol: URI  Patient Summary:  Sammi is a 69 year old ( : 1950 ) female who initiated a Visit for cold, sinus infection, or influenza. When asked the question \"Please sign me up to receive news, health information and promotions. \", Sammi responded \"No\".    Sammi states her symptoms started gradually 3-6 days ago.   Her symptoms consist of ear pain, malaise, a headache, rhinitis, enlarged lymph nodes, facial pain or pressure, myalgia, chills, a sore throat, and a cough. Sammi also feels feverish.   Symptom details     Nasal secretions: The color of her mucus is yellow and clear.    Cough: Sammi coughs a few times an hour and her cough is not more bothersome at night. Phlegm comes into her throat when she coughs. She believes her cough is caused by post-nasal drip. The color of the phlegm is yellow.     Sore throat: Sammi reports having mild throat pain (1-3 on a 10 point pain scale), does not have exudate on her tonsils, and can swallow liquids. The lymph nodes in her neck are enlarged. A rash has not appeared on the skin since the sore throat started.     Temperature: Her current temperature is 101.2 degrees Fahrenheit. Sammi has had a temperature over 100 degrees Fahrenheit for 3-4 days.     Facial pain or pressure: The facial pain or pressure does not feel worse when bending or leaning forward.     Headache: She states the headache is mild (1-3 on a 10 point pain scale).      Sammi denies having wheezing, nasal congestion, and teeth pain. She also denies having recent facial or sinus surgery in the past 60 days, double sickening (worsening symptoms after initial improvement), having a sinus infection within the past year, and taking antibiotic medication for the " symptoms. She is not experiencing dyspnea.   Precipitating events  Sammi is not sure if she has been exposed to someone with strep throat. She has not recently been exposed to someone with influenza. Sammi has not been in close contact with any high risk individuals.   Pertinent COVID-19 (Coronavirus) information  Sammi has not traveled internationally or to the areas where COVID-19 (Coronavirus) is widespread in the last 14 days before the start of her symptoms.   Sammi has not had close contact with a suspected or laboratory-confirmed COVID-19 patient within 14 days of symptom onset.   Sammi is not a healthcare worker and does not work in a healthcare facility.   Pertinent medical history  Sammi does not get yeast infections when she takes antibiotics.   Sammi does not need a return to work/school note.   Weight: 99 lbs   Sammi does not smoke or use smokeless tobacco.   Additional information as reported by the patient (free text): Coronary artery stent   Weight: 99 lbs    MEDICATIONS: Avalide oral, insulin aspart protamine-insulin aspart subcutaneous, letrozole oral, Lipitor oral, ALLERGIES: Sulfa (Sulfonamide Antibiotics)  Clinician Response:  Dear Sammi,  I am sorry you are not feeling well. Your health is our priority. Based on the information you have provided, it is possible that you may have some type of viral infection and additional information is needed to determine appropriate care for you.  Medication information  Because you have a viral infection, antibiotics will not help you get better. Treating a viral infection with antibiotics could actually make you feel worse.  Unless you are allergic to the over-the-counter medication(s) below, I recommend using:     Acetaminophen (Tylenol or store brand) oral tablet. Take 1-2 tablets by mouth every 4-6 hours to help with the discomfort.   Over-the-counter medications do not require a prescription. Ask the pharmacist if you have any questions.  Self  care  The following tips will keep you as comfortable as possible while you recover:     Rest    Drink plenty of water and other liquids    Take a hot shower to loosen congestion    Use throat lozenges    Gargle with warm salt water (1/4 teaspoon of salt per 8 ounce glass of water)    Suck on frozen items such as popsicles or ice cubes    Drink hot tea with lemon and honey    Take a spoonful of honey to reduce your cough     Additional treatment plan   Based on the information you have provided, it is recommended that you go to one of our designated Coronavirus (Covid-19) testing centers to get a test done from your car.To do this follow these instructions:  You should go to one of our dedicated testing centers as soon as able during the hours below at one of these locations:    Walk-in Care: [facility] at [address], [place] [address]. Hours: M-F 7am - 6pm, Sat-Sun 8am -- 3pm   [facility] at [address] [address]. Hours: Every Day 9am -- 7pm  Walk-in Care: [facility] at [address] [place] [address]. Hours: M-F 7am - 6pm, Sat-Sun 8am -- 3pm  M [address] [place] [address]. Hours: M-F 11am -- 7pm, Sat-Sun 9am-4pm  [facility] ([facility])[address], [place] [address].Hours: M-F 730-5     What to expect:    When you arrive please come park in the parking lot.  Be prepared to present photo ID  Call [phone] (For [facility] location call [phone]) and let them know which clinic you are at, the description of your car and where you are parked. Mention you did an [facility] visit and were sent for testing.  On that phone call you will give them the information to register your for the visit.  They will add you to the queue to get your test (you will stay in your car the entire time).  You will then be met by a provider who will perform a brief assessment in  your car and collect samples to test for coronavirus and possibly influenza or RSV.From now until your test results return, please follow self-isolation practices:Isolate Yourself:     Isolate yourself while traveling.  Do Not allow any visitors within 6 feet.  Do Not go to work or school.  Do Not go to Christianity,  centers, shopping, or other public places.  Do Not shake hands.  Avoid close contact with others (hugging, kissing).Protect Others:     Cover Your Mouth and Nose with a mask, disposable tissue or wash cloth to avoid spreading germs to others.  Wash your hands and face frequently with soap and water      Fever Medicines:    For fever relief, take acetaminophen or ibuprofen.  Treat fevers above 101deg F (38.3deg C) to lower fevers and make you more comfortable.   Acetaminophen (e.g., Tylenol): Take 650 mg (two 325 mg pills) by mouth every 4-6 hours as needed of regular strength Tylenol or 1,000 mg (two 500 mg pills) every 8 hours as needed of Extra Strength Tylenol.   Ibuprofen (e.g., Motrin, Advil): Take 400 mg (two 200 mg pills) by mouth every 6 hours as needed.   Acetaminophen is thought to be safer than ibuprofen or naproxen for people over 65 years old. Acetaminophen is in many OTC and prescription medicines. It might be in more than one medicine that you are taking. You need to be careful and not take an overdose. Before taking any medicine, read all the instructions on the package.  Caution -NSAIDs (e.g., ibuprofen, naproxen): Do not take nonsteroidal anti-inflammatory drugs (NSAIDs) if you have stomach problems, kidney disease, heart failure, or other contraindications to using this type of medicine. Do not take NSAID medicines for over 7 days without consulting your PCP. Do not take NSAID medicines if you are pregnant. Do not take NSAID medicines if you are also taking blood thinners.    Call Back If: Breathing difficulty develops or you become worse.  Thank you for limiting contact with  others, wearing a simple mask to cover your cough, practice good hand hygiene habits and accessing our virtual services where possible to limit the spread of this virus.     For more information about COVID19 and options for caring for yourself at home, please visit the CDC website at https://www.cdc.gov/coronavirus/2019-ncov/about/steps-when-sick.html  For more options for care at [facility], please visit our website at [email or web address]    Diagnosis: Cough  Diagnosis ICD: R05

## 2020-03-22 ENCOUNTER — COMMUNICATION - HEALTHEAST (OUTPATIENT)
Dept: SCHEDULING | Facility: CLINIC | Age: 70
End: 2020-03-22

## 2020-06-24 ENCOUNTER — COMMUNICATION - HEALTHEAST (OUTPATIENT)
Dept: SCHEDULING | Facility: CLINIC | Age: 70
End: 2020-06-24

## 2020-06-24 ENCOUNTER — OFFICE VISIT - HEALTHEAST (OUTPATIENT)
Dept: INTERNAL MEDICINE | Facility: CLINIC | Age: 70
End: 2020-06-24
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
## 2020-07-20 ENCOUNTER — RECORDS - HEALTHEAST (OUTPATIENT)
Dept: ADMINISTRATIVE | Facility: OTHER | Age: 70
End: 2020-07-20

## 2020-07-23 ENCOUNTER — INFUSION - HEALTHEAST (OUTPATIENT)
Dept: INFUSION THERAPY | Facility: HOSPITAL | Age: 70
End: 2020-07-23

## 2020-07-23 ENCOUNTER — AMBULATORY - HEALTHEAST (OUTPATIENT)
Dept: INFUSION THERAPY | Facility: HOSPITAL | Age: 70
End: 2020-07-23

## 2020-07-23 ENCOUNTER — OFFICE VISIT - HEALTHEAST (OUTPATIENT)
Dept: ONCOLOGY | Facility: HOSPITAL | Age: 70
End: 2020-07-23

## 2020-07-23 DIAGNOSIS — M81.0 AGE-RELATED OSTEOPOROSIS WITHOUT CURRENT PATHOLOGICAL FRACTURE: ICD-10-CM

## 2020-07-23 DIAGNOSIS — C50.512 MALIGNANT NEOPLASM OF LOWER-OUTER QUADRANT OF BOTH BREASTS IN FEMALE, ESTROGEN RECEPTOR POSITIVE (H): ICD-10-CM

## 2020-07-23 DIAGNOSIS — T50.995S ADVERSE EFFECT OF OTHER DRUGS, MEDICAMENTS AND BIOLOGICAL SUBSTANCES, SEQUELA: ICD-10-CM

## 2020-07-23 DIAGNOSIS — Z17.0 MALIGNANT NEOPLASM OF LOWER-OUTER QUADRANT OF BOTH BREASTS IN FEMALE, ESTROGEN RECEPTOR POSITIVE (H): ICD-10-CM

## 2020-07-23 DIAGNOSIS — C50.511 MALIGNANT NEOPLASM OF LOWER-OUTER QUADRANT OF BOTH BREASTS IN FEMALE, ESTROGEN RECEPTOR POSITIVE (H): ICD-10-CM

## 2020-07-23 LAB
ALBUMIN SERPL-MCNC: 4.4 G/DL (ref 3.5–5)
ALP SERPL-CCNC: 51 U/L (ref 45–120)
ALT SERPL W P-5'-P-CCNC: 24 U/L (ref 0–45)
ANION GAP SERPL CALCULATED.3IONS-SCNC: 9 MMOL/L (ref 5–18)
AST SERPL W P-5'-P-CCNC: 37 U/L (ref 0–40)
BILIRUB SERPL-MCNC: 1.1 MG/DL (ref 0–1)
BUN SERPL-MCNC: 14 MG/DL (ref 8–22)
CALCIUM SERPL-MCNC: 9.5 MG/DL (ref 8.5–10.5)
CHLORIDE BLD-SCNC: 101 MMOL/L (ref 98–107)
CO2 SERPL-SCNC: 30 MMOL/L (ref 22–31)
CREAT SERPL-MCNC: 0.73 MG/DL (ref 0.6–1.1)
GFR SERPL CREATININE-BSD FRML MDRD: >60 ML/MIN/1.73M2
GLUCOSE BLD-MCNC: 106 MG/DL (ref 70–125)
POTASSIUM BLD-SCNC: 3.7 MMOL/L (ref 3.5–5)
PROT SERPL-MCNC: 7 G/DL (ref 6–8)
SODIUM SERPL-SCNC: 140 MMOL/L (ref 136–145)

## 2020-08-20 ENCOUNTER — RECORDS - HEALTHEAST (OUTPATIENT)
Dept: ADMINISTRATIVE | Facility: OTHER | Age: 70
End: 2020-08-20

## 2020-09-09 ENCOUNTER — COMMUNICATION - HEALTHEAST (OUTPATIENT)
Dept: INTERNAL MEDICINE | Facility: CLINIC | Age: 70
End: 2020-09-09

## 2020-10-19 ENCOUNTER — COMMUNICATION - HEALTHEAST (OUTPATIENT)
Dept: INTERNAL MEDICINE | Facility: CLINIC | Age: 70
End: 2020-10-19

## 2020-10-19 ENCOUNTER — OFFICE VISIT - HEALTHEAST (OUTPATIENT)
Dept: INTERNAL MEDICINE | Facility: CLINIC | Age: 70
End: 2020-10-19

## 2020-10-19 DIAGNOSIS — Z00.00 ROUTINE GENERAL MEDICAL EXAMINATION AT A HEALTH CARE FACILITY: ICD-10-CM

## 2020-10-19 DIAGNOSIS — Z17.0 MALIGNANT NEOPLASM OF LOWER-OUTER QUADRANT OF BOTH BREASTS IN FEMALE, ESTROGEN RECEPTOR POSITIVE (H): ICD-10-CM

## 2020-10-19 DIAGNOSIS — C50.512 MALIGNANT NEOPLASM OF LOWER-OUTER QUADRANT OF BOTH BREASTS IN FEMALE, ESTROGEN RECEPTOR POSITIVE (H): ICD-10-CM

## 2020-10-19 DIAGNOSIS — I10 ESSENTIAL HYPERTENSION: ICD-10-CM

## 2020-10-19 DIAGNOSIS — Z79.899 MEDICATION MANAGEMENT: ICD-10-CM

## 2020-10-19 DIAGNOSIS — Z96.41 INSULIN PUMP IN PLACE: ICD-10-CM

## 2020-10-19 DIAGNOSIS — Z23 NEED FOR VACCINATION: ICD-10-CM

## 2020-10-19 DIAGNOSIS — C50.511 MALIGNANT NEOPLASM OF LOWER-OUTER QUADRANT OF BOTH BREASTS IN FEMALE, ESTROGEN RECEPTOR POSITIVE (H): ICD-10-CM

## 2020-10-19 DIAGNOSIS — E10.8 CONTROLLED DIABETES MELLITUS TYPE 1 WITH COMPLICATIONS (H): ICD-10-CM

## 2020-10-19 DIAGNOSIS — I25.10 ASHD (ARTERIOSCLEROTIC HEART DISEASE): ICD-10-CM

## 2020-10-19 DIAGNOSIS — M81.0 AGE-RELATED OSTEOPOROSIS WITHOUT CURRENT PATHOLOGICAL FRACTURE: ICD-10-CM

## 2020-10-19 LAB
ALBUMIN SERPL-MCNC: 4.4 G/DL (ref 3.5–5)
ALBUMIN UR-MCNC: NEGATIVE MG/DL
ALP SERPL-CCNC: 52 U/L (ref 45–120)
ALT SERPL W P-5'-P-CCNC: 24 U/L (ref 0–45)
ANION GAP SERPL CALCULATED.3IONS-SCNC: 10 MMOL/L (ref 5–18)
APPEARANCE UR: CLEAR
AST SERPL W P-5'-P-CCNC: 40 U/L (ref 0–40)
BASOPHILS # BLD AUTO: 0.1 THOU/UL (ref 0–0.2)
BASOPHILS NFR BLD AUTO: 1 % (ref 0–2)
BILIRUB SERPL-MCNC: 1.1 MG/DL (ref 0–1)
BILIRUB UR QL STRIP: NEGATIVE
BUN SERPL-MCNC: 17 MG/DL (ref 8–22)
CALCIUM SERPL-MCNC: 9.3 MG/DL (ref 8.5–10.5)
CHLORIDE BLD-SCNC: 100 MMOL/L (ref 98–107)
CHOLEST SERPL-MCNC: 142 MG/DL
CO2 SERPL-SCNC: 30 MMOL/L (ref 22–31)
COLOR UR AUTO: YELLOW
CREAT SERPL-MCNC: 0.69 MG/DL (ref 0.6–1.1)
CREAT UR-MCNC: 41.9 MG/DL
EOSINOPHIL # BLD AUTO: 0.1 THOU/UL (ref 0–0.4)
EOSINOPHIL NFR BLD AUTO: 1 % (ref 0–6)
ERYTHROCYTE [DISTWIDTH] IN BLOOD BY AUTOMATED COUNT: 11.7 % (ref 11–14.5)
FASTING STATUS PATIENT QL REPORTED: YES
GFR SERPL CREATININE-BSD FRML MDRD: >60 ML/MIN/1.73M2
GLUCOSE BLD-MCNC: 96 MG/DL (ref 70–125)
GLUCOSE UR STRIP-MCNC: NEGATIVE MG/DL
HBA1C MFR BLD: 5.9 %
HCT VFR BLD AUTO: 45.1 % (ref 35–47)
HDLC SERPL-MCNC: 78 MG/DL
HGB BLD-MCNC: 15.2 G/DL (ref 12–16)
HGB UR QL STRIP: NEGATIVE
KETONES UR STRIP-MCNC: ABNORMAL MG/DL
LDLC SERPL CALC-MCNC: 57 MG/DL
LEUKOCYTE ESTERASE UR QL STRIP: NEGATIVE
LYMPHOCYTES # BLD AUTO: 2.9 THOU/UL (ref 0.8–4.4)
LYMPHOCYTES NFR BLD AUTO: 42 % (ref 20–40)
MCH RBC QN AUTO: 32.3 PG (ref 27–34)
MCHC RBC AUTO-ENTMCNC: 33.7 G/DL (ref 32–36)
MCV RBC AUTO: 96 FL (ref 80–100)
MICROALBUMIN UR-MCNC: <0.5 MG/DL (ref 0–1.99)
MICROALBUMIN/CREAT UR: NORMAL MG/G{CREAT}
MONOCYTES # BLD AUTO: 0.5 THOU/UL (ref 0–0.9)
MONOCYTES NFR BLD AUTO: 7 % (ref 2–10)
NEUTROPHILS # BLD AUTO: 3.3 THOU/UL (ref 2–7.7)
NEUTROPHILS NFR BLD AUTO: 49 % (ref 50–70)
NITRATE UR QL: NEGATIVE
PH UR STRIP: 5.5 [PH] (ref 5–8)
PLATELET # BLD AUTO: 276 THOU/UL (ref 140–440)
PMV BLD AUTO: 6.4 FL (ref 7–10)
POTASSIUM BLD-SCNC: 4.4 MMOL/L (ref 3.5–5)
PROT SERPL-MCNC: 6.9 G/DL (ref 6–8)
RBC # BLD AUTO: 4.7 MILL/UL (ref 3.8–5.4)
SODIUM SERPL-SCNC: 140 MMOL/L (ref 136–145)
SP GR UR STRIP: 1.01 (ref 1–1.03)
TRIGL SERPL-MCNC: 37 MG/DL
UROBILINOGEN UR STRIP-ACNC: ABNORMAL
WBC: 6.8 THOU/UL (ref 4–11)

## 2020-10-19 RX ORDER — IRBESARTAN AND HYDROCHLOROTHIAZIDE 150; 12.5 MG/1; MG/1
1 TABLET, FILM COATED ORAL DAILY
Qty: 90 TABLET | Refills: 3 | Status: SHIPPED | OUTPATIENT
Start: 2020-10-19 | End: 2021-10-06

## 2020-10-19 RX ORDER — ATORVASTATIN CALCIUM 40 MG/1
40 TABLET, FILM COATED ORAL AT BEDTIME
Qty: 90 TABLET | Refills: 3 | Status: SHIPPED | OUTPATIENT
Start: 2020-10-19 | End: 2021-10-03

## 2020-10-19 ASSESSMENT — MIFFLIN-ST. JEOR: SCORE: 957.26

## 2020-11-09 ENCOUNTER — COMMUNICATION - HEALTHEAST (OUTPATIENT)
Dept: ONCOLOGY | Facility: HOSPITAL | Age: 70
End: 2020-11-09

## 2020-11-10 ENCOUNTER — COMMUNICATION - HEALTHEAST (OUTPATIENT)
Dept: CARDIOLOGY | Facility: CLINIC | Age: 70
End: 2020-11-10

## 2020-11-10 ENCOUNTER — COMMUNICATION - HEALTHEAST (OUTPATIENT)
Dept: ONCOLOGY | Facility: HOSPITAL | Age: 70
End: 2020-11-10

## 2020-11-10 DIAGNOSIS — I25.10 ATHEROSCLEROSIS OF NATIVE CORONARY ARTERY OF NATIVE HEART WITHOUT ANGINA PECTORIS: ICD-10-CM

## 2020-11-10 DIAGNOSIS — Z95.5 S/P CORONARY ARTERY STENT PLACEMENT: ICD-10-CM

## 2020-12-01 ENCOUNTER — HOSPITAL ENCOUNTER (OUTPATIENT)
Dept: CARDIOLOGY | Facility: CLINIC | Age: 70
Discharge: HOME | End: 2020-12-01
Attending: INTERNAL MEDICINE

## 2020-12-01 ENCOUNTER — HOSPITAL ENCOUNTER (OUTPATIENT)
Dept: NUCLEAR MEDICINE | Facility: CLINIC | Age: 70
Discharge: HOME | End: 2020-12-01
Attending: INTERNAL MEDICINE

## 2020-12-01 DIAGNOSIS — Z95.5 S/P CORONARY ARTERY STENT PLACEMENT: ICD-10-CM

## 2020-12-01 DIAGNOSIS — I25.10 ATHEROSCLEROSIS OF NATIVE CORONARY ARTERY OF NATIVE HEART WITHOUT ANGINA PECTORIS: ICD-10-CM

## 2020-12-01 LAB
CV STRESS CURRENT BP HE: NORMAL
CV STRESS CURRENT HR HE: 103
CV STRESS CURRENT HR HE: 109
CV STRESS CURRENT HR HE: 112
CV STRESS CURRENT HR HE: 114
CV STRESS CURRENT HR HE: 126
CV STRESS CURRENT HR HE: 127
CV STRESS CURRENT HR HE: 128
CV STRESS CURRENT HR HE: 128
CV STRESS CURRENT HR HE: 129
CV STRESS CURRENT HR HE: 68
CV STRESS CURRENT HR HE: 69
CV STRESS CURRENT HR HE: 71
CV STRESS CURRENT HR HE: 72
CV STRESS CURRENT HR HE: 73
CV STRESS CURRENT HR HE: 74
CV STRESS CURRENT HR HE: 75
CV STRESS CURRENT HR HE: 75
CV STRESS CURRENT HR HE: 76
CV STRESS CURRENT HR HE: 78
CV STRESS CURRENT HR HE: 83
CV STRESS CURRENT HR HE: 83
CV STRESS CURRENT HR HE: 89
CV STRESS CURRENT HR HE: 90
CV STRESS CURRENT HR HE: 93
CV STRESS CURRENT HR HE: 93
CV STRESS CURRENT HR HE: 95
CV STRESS CURRENT HR HE: 98
CV STRESS DEVIATION TIME HE: NORMAL
CV STRESS ECHO PERCENT HR HE: NORMAL
CV STRESS EXERCISE STAGE HE: NORMAL
CV STRESS FINAL RESTING BP HE: NORMAL
CV STRESS FINAL RESTING HR HE: 71
CV STRESS MAX HR HE: 130
CV STRESS MAX TREADMILL GRADE HE: 14
CV STRESS MAX TREADMILL SPEED HE: 3.4
CV STRESS PEAK DIA BP HE: NORMAL
CV STRESS PEAK SYS BP HE: NORMAL
CV STRESS PHASE HE: NORMAL
CV STRESS PROTOCOL HE: NORMAL
CV STRESS RESTING PT POSITION HE: NORMAL
CV STRESS ST DEVIATION AMOUNT HE: NORMAL
CV STRESS ST DEVIATION ELEVATION HE: NORMAL
CV STRESS ST EVELATION AMOUNT HE: NORMAL
CV STRESS TEST TYPE HE: NORMAL
CV STRESS TOTAL STAGE TIME MIN 1 HE: NORMAL
RATE PRESSURE PRODUCT: NORMAL
STRESS ECHO BASELINE DIASTOLIC HE: 68
STRESS ECHO BASELINE HR: 69
STRESS ECHO BASELINE SYSTOLIC BP: 131
STRESS ECHO CALCULATED PERCENT HR: 86 %
STRESS ECHO LAST STRESS DIASTOLIC BP: 68
STRESS ECHO LAST STRESS HR: 129
STRESS ECHO LAST STRESS SYSTOLIC BP: 148
STRESS ECHO POST ESTIMATED WORKLOAD: 10.3
STRESS ECHO POST EXERCISE DUR MIN: 10
STRESS ECHO POST EXERCISE DUR SEC: 5
STRESS ECHO TARGET HR: 152
STRESS ST DEPRESSION: 1.5 MM

## 2020-12-01 RX ORDER — PROCHLORPERAZINE 25 MG/1
SUPPOSITORY RECTAL
Status: SHIPPED | COMMUNITY
Start: 2020-10-24

## 2020-12-04 ENCOUNTER — AMBULATORY - HEALTHEAST (OUTPATIENT)
Dept: CARDIOLOGY | Facility: CLINIC | Age: 70
End: 2020-12-04

## 2020-12-04 DIAGNOSIS — I25.10 ATHEROSCLEROSIS OF NATIVE CORONARY ARTERY OF NATIVE HEART WITHOUT ANGINA PECTORIS: ICD-10-CM

## 2020-12-10 ENCOUNTER — HOSPITAL ENCOUNTER (OUTPATIENT)
Dept: CARDIOLOGY | Facility: CLINIC | Age: 70
Discharge: HOME | End: 2020-12-10
Attending: INTERNAL MEDICINE

## 2020-12-10 ENCOUNTER — COMMUNICATION - HEALTHEAST (OUTPATIENT)
Dept: CARDIOLOGY | Facility: CLINIC | Age: 70
End: 2020-12-10

## 2020-12-10 ENCOUNTER — OFFICE VISIT - HEALTHEAST (OUTPATIENT)
Dept: CARDIOLOGY | Facility: CLINIC | Age: 70
End: 2020-12-10

## 2020-12-10 DIAGNOSIS — E78.2 MIXED HYPERLIPIDEMIA: ICD-10-CM

## 2020-12-10 DIAGNOSIS — I25.10 ATHEROSCLEROSIS OF NATIVE CORONARY ARTERY OF NATIVE HEART WITHOUT ANGINA PECTORIS: ICD-10-CM

## 2020-12-10 DIAGNOSIS — Z95.5 S/P CORONARY ARTERY STENT PLACEMENT: ICD-10-CM

## 2020-12-10 LAB
AORTIC ROOT: 2.6 CM
BSA FOR ECHO PROCEDURE: 1.43 M2
CV BLOOD PRESSURE: ABNORMAL MMHG
CV ECHO HEIGHT: 63 IN
CV ECHO WEIGHT: 101 LBS
DOP CALC LVOT AREA: 2.54 CM2
DOP CALC LVOT DIAMETER: 1.8 CM
EJECTION FRACTION: 69 % (ref 55–75)
FRACTIONAL SHORTENING: 37.7 % (ref 28–44)
INTERVENTRICULAR SEPTUM IN END DIASTOLE: 0.48 CM (ref 0.6–0.9)
IVS/PW RATIO: 0.6
LA AREA 1: 9.62 CM2
LA AREA 2: 10.5 CM2
LEFT ATRIUM LENGTH: 3.82 CM
LEFT ATRIUM SIZE: 2.5 CM
LEFT ATRIUM TO AORTIC ROOT RATIO: 0.96 NO UNITS
LEFT ATRIUM VOLUME INDEX: 15.7 ML/M2
LEFT ATRIUM VOLUME: 22.5 ML
LEFT VENTRICLE DIASTOLIC VOLUME INDEX: 35.7 CM3/M2 (ref 29–61)
LEFT VENTRICLE DIASTOLIC VOLUME: 51 CM3 (ref 46–106)
LEFT VENTRICLE HEART RATE: 76 BPM
LEFT VENTRICLE MASS INDEX: 53.8 G/M2
LEFT VENTRICLE SYSTOLIC VOLUME INDEX: 11.2 CM3/M2 (ref 8–24)
LEFT VENTRICLE SYSTOLIC VOLUME: 16 CM3 (ref 14–42)
LEFT VENTRICULAR INTERNAL DIMENSION IN DIASTOLE: 4.22 CM (ref 3.8–5.2)
LEFT VENTRICULAR INTERNAL DIMENSION IN SYSTOLE: 2.63 CM (ref 2.2–3.5)
LEFT VENTRICULAR MASS: 76.9 G
LEFT VENTRICULAR POSTERIOR WALL IN END DIASTOLE: 0.8 CM (ref 0.6–0.9)
MITRAL VALVE E/A RATIO: 1.2
MV AVERAGE E/E' RATIO: 6 CM/S
MV DECELERATION TIME: 180 MS
MV E'TISSUE VEL-LAT: 14 CM/S
MV E'TISSUE VEL-MED: 9.85 CM/S
MV LATERAL E/E' RATIO: 5.1
MV MEDIAL E/E' RATIO: 7.3
MV PEAK A VELOCITY: 61.2 CM/S
MV PEAK E VELOCITY: 71.6 CM/S
NUC REST DIASTOLIC VOLUME INDEX: 1616 LBS
NUC REST SYSTOLIC VOLUME INDEX: 63 IN
TRICUSPID REGURGITATION PEAK PRESSURE GRADIENT: 28.9 MMHG
TRICUSPID VALVE ANULAR PLANE SYSTOLIC EXCURSION: 2.3 CM
TRICUSPID VALVE PEAK REGURGITANT VELOCITY: 269 CM/S

## 2020-12-10 ASSESSMENT — MIFFLIN-ST. JEOR
SCORE: 957.26
SCORE: 957.26

## 2020-12-30 ENCOUNTER — OFFICE VISIT - HEALTHEAST (OUTPATIENT)
Dept: INTERNAL MEDICINE | Facility: CLINIC | Age: 70
End: 2020-12-30

## 2020-12-30 DIAGNOSIS — C50.519 MALIGNANT NEOPLASM OF LOWER-OUTER QUADRANT OF FEMALE BREAST, UNSPECIFIED ESTROGEN RECEPTOR STATUS, UNSPECIFIED LATERALITY (H): ICD-10-CM

## 2020-12-30 DIAGNOSIS — M81.0 AGE-RELATED OSTEOPOROSIS WITHOUT CURRENT PATHOLOGICAL FRACTURE: ICD-10-CM

## 2020-12-30 DIAGNOSIS — Z95.5 S/P CORONARY ARTERY STENT PLACEMENT: ICD-10-CM

## 2020-12-30 DIAGNOSIS — Z00.00 HEALTH MAINTENANCE EXAMINATION: ICD-10-CM

## 2020-12-30 DIAGNOSIS — E10.8 TYPE 1 DIABETES MELLITUS WITH COMPLICATION (H): ICD-10-CM

## 2020-12-30 ASSESSMENT — MIFFLIN-ST. JEOR: SCORE: 957.26

## 2021-01-21 ENCOUNTER — AMBULATORY - HEALTHEAST (OUTPATIENT)
Dept: INFUSION THERAPY | Facility: HOSPITAL | Age: 71
End: 2021-01-21

## 2021-01-21 ENCOUNTER — OFFICE VISIT - HEALTHEAST (OUTPATIENT)
Dept: ONCOLOGY | Facility: HOSPITAL | Age: 71
End: 2021-01-21

## 2021-01-21 ENCOUNTER — INFUSION - HEALTHEAST (OUTPATIENT)
Dept: INFUSION THERAPY | Facility: HOSPITAL | Age: 71
End: 2021-01-21

## 2021-01-21 DIAGNOSIS — Z17.0 MALIGNANT NEOPLASM OF LOWER-OUTER QUADRANT OF BOTH BREASTS IN FEMALE, ESTROGEN RECEPTOR POSITIVE (H): ICD-10-CM

## 2021-01-21 DIAGNOSIS — T50.995S ADVERSE EFFECT OF OTHER DRUGS, MEDICAMENTS AND BIOLOGICAL SUBSTANCES, SEQUELA: ICD-10-CM

## 2021-01-21 DIAGNOSIS — C50.512 MALIGNANT NEOPLASM OF LOWER-OUTER QUADRANT OF BOTH BREASTS IN FEMALE, ESTROGEN RECEPTOR POSITIVE (H): ICD-10-CM

## 2021-01-21 DIAGNOSIS — M81.0 AGE-RELATED OSTEOPOROSIS WITHOUT CURRENT PATHOLOGICAL FRACTURE: ICD-10-CM

## 2021-01-21 DIAGNOSIS — C50.511 MALIGNANT NEOPLASM OF LOWER-OUTER QUADRANT OF BOTH BREASTS IN FEMALE, ESTROGEN RECEPTOR POSITIVE (H): ICD-10-CM

## 2021-01-21 LAB
ALBUMIN SERPL-MCNC: 4.3 G/DL (ref 3.5–5)
ALP SERPL-CCNC: 52 U/L (ref 45–120)
ALT SERPL W P-5'-P-CCNC: 22 U/L (ref 0–45)
ANION GAP SERPL CALCULATED.3IONS-SCNC: 9 MMOL/L (ref 5–18)
AST SERPL W P-5'-P-CCNC: 34 U/L (ref 0–40)
BILIRUB SERPL-MCNC: 0.7 MG/DL (ref 0–1)
BUN SERPL-MCNC: 13 MG/DL (ref 8–22)
CALCIUM SERPL-MCNC: 8.9 MG/DL (ref 8.5–10.5)
CHLORIDE BLD-SCNC: 100 MMOL/L (ref 98–107)
CO2 SERPL-SCNC: 30 MMOL/L (ref 22–31)
CREAT SERPL-MCNC: 0.72 MG/DL (ref 0.6–1.1)
GFR SERPL CREATININE-BSD FRML MDRD: >60 ML/MIN/1.73M2
GLUCOSE BLD-MCNC: 81 MG/DL (ref 70–125)
POTASSIUM BLD-SCNC: 3.2 MMOL/L (ref 3.5–5)
PROT SERPL-MCNC: 7 G/DL (ref 6–8)
SODIUM SERPL-SCNC: 139 MMOL/L (ref 136–145)

## 2021-01-29 ENCOUNTER — RECORDS - HEALTHEAST (OUTPATIENT)
Dept: ADMINISTRATIVE | Facility: OTHER | Age: 71
End: 2021-01-29

## 2021-03-17 ENCOUNTER — COMMUNICATION - HEALTHEAST (OUTPATIENT)
Dept: INTERNAL MEDICINE | Facility: CLINIC | Age: 71
End: 2021-03-17

## 2021-04-08 ENCOUNTER — COMMUNICATION - HEALTHEAST (OUTPATIENT)
Dept: ONCOLOGY | Facility: HOSPITAL | Age: 71
End: 2021-04-08

## 2021-04-30 ENCOUNTER — RECORDS - HEALTHEAST (OUTPATIENT)
Dept: ADMINISTRATIVE | Facility: OTHER | Age: 71
End: 2021-04-30

## 2021-05-25 ENCOUNTER — OFFICE VISIT - HEALTHEAST (OUTPATIENT)
Dept: FAMILY MEDICINE | Facility: CLINIC | Age: 71
End: 2021-05-25
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
## 2021-05-25 DIAGNOSIS — S00.462A TICK BITE OF LEFT EAR, INITIAL ENCOUNTER: ICD-10-CM

## 2021-05-26 ASSESSMENT — PATIENT HEALTH QUESTIONNAIRE - PHQ9: SUM OF ALL RESPONSES TO PHQ QUESTIONS 1-9: 8

## 2021-05-29 NOTE — PROGRESS NOTES
Office Visit   Sammi Guadalupe   68 y.o. female    Date of Visit: 5/23/2019    Chief Complaint   Patient presents with     Tick bite     Since last night        Assessment and Plan   1. Lyme disease  She pulled off a deer tick that was embedded for possibly 4 to 5 days.  She is having an increase in her aching and some pain around the site.  I will go ahead and treat her for presumed Lyme disease.  She has had in the past.  She has no other acute concerns today.  If she is not improving or symptoms worsen she will let me know.  - doxycycline (ADOXA) 100 MG tablet; Take 1 tablet (100 mg total) by mouth 2 (two) times a day.  Dispense: 20 tablet; Refill: 0       No follow-ups on file.     History of Present Illness   This 68 y.o. old female patient comes in after pulling off a deer tick last night.  She thinks it probably been there for about 4 to 5 days.  She notes that she is starting to have increase in her aching.  She also has pain at the site of where the deer tick was.  She brought in the deer tick and it is definitely a deer tick.  She has had Lyme in the past and has tolerated doxycycline.  She has no other acute concerns today.    Review of Systems: As above, systems otherwise reviewed and negative.     Medications, Allergies and Problem List   Patient Active Problem List   Diagnosis     Hypertension     Coronary Artery Disease     Malignant neoplasm of lower-outer quadrant of female breast (H)     Breast cancer, right breast (H)     Swelling of arm     DVT (deep venous thrombosis) (H)     Mixed hyperlipidemia     Age-related osteoporosis without current pathological fracture     Diabetes mellitus type I, controlled (H)     S/P coronary artery stent placement     Precordial pain     Adverse effect of other drugs, medicaments and biological substances, sequela     Insulin pump in place     Current Outpatient Medications   Medication Sig Dispense Refill     anastrozole (ARIMIDEX) 1 mg tablet Take 1 tablet (1  "mg total) by mouth daily. 90 tablet 3     aspirin 81 MG tablet Take 81 mg by mouth daily.       atorvastatin (LIPITOR) 40 MG tablet Take 1 tablet (40 mg total) by mouth at bedtime. 90 tablet 3     b complex vitamins tablet Take 1 tablet by mouth daily.       blood glucose test strips Dispense item covered by pt ins. Test 6 times a day       calcium carbonate (OS-PATTI) 600 mg (1,500 mg) tablet Take 1,200 mg by mouth bedtime. Plus magnesium        cholecalciferol, vitamin D3, (VITAMIN D3) 2,000 unit cap Take 2,000 Units by mouth bedtime.        denosumab 60 mg/mL Syrg Inject 60 mg under the skin.       insulin lispro (HUMALOG) 100 unit/mL injection 20 Units daily.       irbesartan-hydrochlorothiazide (AVALIDE) 150-12.5 mg per tablet Take 1 tablet by mouth daily. 90 tablet 3     lysine 500 mg Tab Take 500 mg by mouth 2 (two) times a day.       multivitamin (MULTIVITAMIN) per tablet Take 1 tablet by mouth daily.       OMEGA-3/DHA/EPA/FISH OIL (FISH OIL-OMEGA-3 FATTY ACIDS) 300-1,000 mg capsule Take 500 mg by mouth 2 (two) times a day.        doxycycline (ADOXA) 100 MG tablet Take 1 tablet (100 mg total) by mouth 2 (two) times a day. 20 tablet 0     No current facility-administered medications for this visit.      Allergies   Allergen Reactions     Nitroglycerin Other (See Comments)     Blood Pressure drops significantly.     Sulfa (Sulfonamide Antibiotics) Dizziness     Dizziness and doesn't feel normal.          Physical Exam     /78 (Patient Site: Right Arm, Patient Position: Sitting)   Pulse 70   Ht 5' 3\" (1.6 m)   Wt 103 lb (46.7 kg)   SpO2 100%   BMI 18.25 kg/m      This is an alert female, sitting comfortably, no apparent distress.  Skin: On the back of her neck there is a small raised, red area where the tick was.  There is no significant surrounding redness or warmth.     Additional Information   Social History     Tobacco Use     Smoking status: Former Smoker     Packs/day: 0.50     Years: 10.00     " Pack years: 5.00     Smokeless tobacco: Never Used   Substance Use Topics     Alcohol use: Yes     Alcohol/week: 3.0 oz     Types: 3 Glasses of wine, 2 Cans of beer per week     Drug use: No          Amberly Ca MD

## 2021-05-30 VITALS — BODY MASS INDEX: 18.6 KG/M2 | WEIGHT: 105 LBS

## 2021-05-30 NOTE — TELEPHONE ENCOUNTER
Are you willing to prescribe antibiotics for patient or have her wait for PCP return tomorrow? Thank you.    Alison Husain, CMA

## 2021-05-30 NOTE — TELEPHONE ENCOUNTER
Sammi calls in today with a couple of concerns.  She was in on 7/9 to see Dr Sanchez in the clinic and he advised that she stop her anastrozole.  She stopped taking it on 7/10/19.  She was made aware that he is on vacation for 2 weeks coming up, which will be the time he told her to call in and update us on how she is feeling.  She is concerned about that.  I made her aware that if she calls on 7/22, he will be here that day and we can make a plan from then.  Otherwise, if she calls when he is gone, signoff will have been given to Estela Adams CNP who will be able to assist her with this.  She is comfortable with either approach but does still have a couple of questions that she would like answered.  She is aware that I may not get back to her today.  She was okay if someone called her tomorrow with the answers.    1.  She wants to know the efficacy of the exemestane in comparison to the anastrozole.  Will she be losing anything if she switches from anastrozole to exemestane?    2.  Dr Altman prescribed Prolia knowing she would be starting on anastrozole.  If he had not prescribed this, would Dr Sanchez have prescribed this medication, or another medication like that?      I let her know someone would be calling her back with an answer to her questions.  She verbalized understanding.    Sheree Schroeder RN

## 2021-05-30 NOTE — TELEPHONE ENCOUNTER
I'm sending a prescription for 1,  200 mg dose of doxycycline for prophylaxis since she is not having symptoms.  Please let her know.  Thanks.

## 2021-05-30 NOTE — TELEPHONE ENCOUNTER
Spoke with the patient who had multiple questions for Dr. Altman.  Patient decided to make an appointment to discuss her questions and concerns face to face with Dr. Altman.  She has scheduled an appointment on Monday, 7/22/19 at 10:20 am.  She had no further questions at this time.  Hannah CARVER CMA/JULIENNE....................5:13 PM

## 2021-05-30 NOTE — TELEPHONE ENCOUNTER
Who is calling:  Patient  Reason for Call: Found a deer tick on her right knee last night, pulled it off( she kept it)  Patient reportsshe has been treated the last 3 years  for lyme's disease (requesting antibiotic for limes disease prevention)  please call patient and advise.     Date of last appointment with primary care:  1/31/19  Has the patient been recently seen:  Yes  Okay to leave a detailed message: Yes

## 2021-05-30 NOTE — PROGRESS NOTES
[facility] Progress Note    Patient: Sammi Guadalupe  MRN: [id number]  Date of Service:         Reason for Visit    Chief Complaint   Patient presents with     HE Cancer     Malignant neoplasm of lower-outer quadrant of both breasts       Assessment and Plan    T1 N0 M0, stage I right breast cancer, HER-2 positive by RT-PCR and fish, Oncotype score of 30, tumor ER/NY positive, diagnosis October 2013  Myalgias  Osteopenia    With increasing low back pain and stiffness.  She feels it may be related to Prolia but it is more likely the anastrozole.  Recommend that we stop anastrozole for now.  We will also hold Prolia dose today.  She will call us with an update in a couple of weeks.  If her symptoms have improved then we will consider switching to exemestane.  Gave her information about exemestane.  We will plan to administer Prolia in about 2 to 3 weeks.  We will plan to see her again in 6 months for follow-up when she will be again do lab work and another dose of the Prolia.  She understands and is agreeable to the plan.    Plan: Stop anastrozole for 2 weeks  Hold Prolia today  Call with follow-up in 2 weeks  We will likely switch to exemestane if symptoms are better  Plan to repeat Prolia in 2 weeks  Follow-up in 6 months    Measurable disease: None postoperatively        Current therapy: Anastrozole 1 mg by mouth daily started in April 2016 Tamoxifen 20 mg daily since May 2014        TREATMENT HISTORY: 1 year of Herceptin completed in February 2015    Taxotere carboplatin and Herceptin for 5 cycles completed in April 2014    One cycle of Taxotere and cyclophosphamide started January 30, 2014    Bilateral mastectomy in October 2013       Malignant neoplasm of lower-outer quadrant of female breast    Staging form: Breast, AJCC 7th Edition      Clinical: Stage IA (T1, N0, cM0) - Signed by Gagan Sanchez MD on 6/3/2014    ECOG Performance   ECOG Performance Status: 1    Distress  Assessment  Distress Assessment Score: No distress    Pain  Pain Score (Initial OR Reassessment): 2        Problem List    1. Malignant neoplasm of lower-outer quadrant of both breasts in female, estrogen receptor positive (H)          CC: Earl Altman MD    ______________________________________________________________________________    History of Present Illness    Ms. Sammi Guadalupe is here for reevaluation.  Seen 6 months ago.  Has been having increasing issues with low back pain.  No headaches or dizziness.  No shortness of breath or cough.  No other abdominal pain.  Does have stiffness with pain which improves with activity.  ECOG status is 0.    Pain Status  Currently in Pain: Yes    Review of Systems    Constitutional  Constitutional (WDL): All constitutional elements are within defined limits  Neurosensory  Neurosensory (WDL): Exceptions to WDL  Peripheral Sensory Neuropathy: Asymptomatic, loss of deep tendon reflexes or paresthesia(Numbness to feet. )  Cardiovascular  Cardiovascular (WDL): All cardiovascular elements are within defined limits  Pulmonary  Respiratory (WDL): Within Defined Limits  Gastrointestinal  Gastrointestinal (WDL): All gastrointestinal elements are within defined limits  Genitourinary  Genitourinary (WDL): All genitourinary elements are within defined limits  Integumentary  Integumentary (WDL): All integumentary elements are within defined limits  Patient Coping  Patient Coping: Accepting  Distress Assessment  Distress Assessment Score: No distress  Accompanied by  Accompanied by: Alone    Past History  Past Medical History:   Diagnosis Date     ASHD (arteriosclerotic heart disease) 2012    CT scan, calcium score 428 lad artery deployment of a non-drug-eluting stent in the LAD     Breast cancer (H)     right     Coronary artery disease      Diabetes mellitus (H)     type 2     DVT (deep venous thrombosis) (H)      Family history of myocardial infarction      High cholesterol       Hyperlipidemia      Hypertension          Past Surgical History:   Procedure Laterality Date     BACK SURGERY       CARPAL TUNNEL RELEASE Bilateral      LAMINECTOMY AND MICRODISCECTOMY CERVICAL SPINE N/A      LAPAROSCOPIC HYSTERECTOMY N/A 12/12/2016    Procedure: ROBOTIC TOTAL LAPAROSCOPIC HYSTERECTOMY, BILATERAL SALPINGO-OOPHORECTOMY, CYSTOSCOPY;  Surgeon: Sohan Ramirez MD;  Location: [facility];  Service:      MASTECTOMY Bilateral        Physical Exam    Recent Vitals 7/9/2019   Height -   Weight 100 lbs   BSA (m2) 1.42 m2   /65   Pulse 62   Temp 97.8   Temp src 1   SpO2 100   Some recent data might be hidden       GENERAL: Alert and oriented. Seated comfortably. In no distress.    HEAD: Atraumatic and normocephalic.  Has a full head of hair.    EYES: MINH, EOMI.  No pallor.  No icterus.    Oral cavity: no mucosal lesion or tonsillar enlargement.    NECK: supple. JVP normal.  No thyroid enlargement.    LYMPH NODES: No palpable, cervical, axillary or inguinal lymphadenopathy.    CHEST: clear to auscultation bilaterally.  Resonant to percussion throughout bilaterally.  Symmetrical breath movements bilaterally.    CVS: S1 and S2 are heard. Regular rate and rhythm.  No murmur or gallop or rub heard.    Breasts: She is status post bilateral mastectomies.  No locally recurrent disease.  No axillary adenopathy.    ABDOMEN: Soft. Not tender. Not distended.  No palpable hepatomegaly or splenomegaly.  No other mass palpable.  Bowel sounds heard.    EXTREMITIES: Warm.  No peripheral edema.  No tenderness to palpation over the upper arms    SKIN: no rash, or bruising or purpura.        Lab Results    Recent Results (from the past 168 hour(s))   Comprehensive Metabolic Panel   Result Value Ref Range    Sodium 137 136 - 145 mmol/L    Potassium 3.9 3.5 - 5.0 mmol/L    Chloride 99 98 - 107 mmol/L    CO2 32 (H) 22 - 31 mmol/L    Anion Gap, Calculation 6 5 - 18 mmol/L    Glucose 104 70 - 125 mg/dL    BUN 10 8 -  22 mg/dL    Creatinine 0.73 0.60 - 1.10 mg/dL    GFR MDRD Af Amer >60 >60 mL/min/1.73m2    GFR MDRD Non Af Amer >60 >60 mL/min/1.73m2    Bilirubin, Total 0.8 0.0 - 1.0 mg/dL    Calcium 9.9 8.5 - 10.5 mg/dL    Protein, Total 7.2 6.0 - 8.0 g/dL    Albumin 4.3 3.5 - 5.0 g/dL    Alkaline Phosphatase 50 45 - 120 U/L    AST 32 0 - 40 U/L    ALT 21 0 - 45 U/L       Imaging    No results found.      Signed by: Gagan Sanchez MD

## 2021-05-30 NOTE — TELEPHONE ENCOUNTER
"Medication Question or Clarification  Who is calling: Patient  What medication are you calling about? (include dose and sig) Prolia 60 mg every 6 months  Who prescribed the medication?: Dr Sanchez in Oncology   What is your question/concern?: The patient was informed by the above mentioned provider that, based on her last DEXA scan, she would not need to continue this medication any long. She is requesting Dr Altman's recommendation as she feels there is, \"A lack of communication\".   Pharmacy: N/A  Okay to leave a detailed message?: Yes  Site CMT - Please call the pharmacy to obtain any additional needed information.  "

## 2021-05-30 NOTE — TELEPHONE ENCOUNTER
for patient as a follow up to triage call from 7-11-19.  Per Dr Sanchez,  the anastrazole has the same efficacy as the exemestane so there is no loss when switching the medication.  Also Per Dr Sanchez, he would not prescribe the Prolia unless her bone scan indicated fracture risk is high which was not indicated in her most recent bone density exam.  Advised to call if any further concerns.

## 2021-05-30 NOTE — TELEPHONE ENCOUNTER
Sammi calls in today to update Dr Sanchez on how she is feeling after being off of the anastrozole for almost 2 weeks.  She states that the overall general achiness is gone.  She does still have some achiness in her back but in the last day or two, that has gotten better as well.  She also reports that she called her insurance company and they do not cover the exemestane for her.  She said they do cover letrozole.  Also, she was in to see Dr Altman today who recommends that she continue on the Prolia injections due to her family history and her personal diagnoses.  I let her know that Jimenez, our pharmacy liaison can look into grants and other assistance for her on the exemestane as well.  She was appreciative of this.    Per Dr Sanchez, he thinks she needs to stop the anastrozole.  He would like her to try the exemestane.  We will continue the PA that has been started for this and if it is still denied, we will look into grants and what pharmacies have the best prices for her.  Also, Dr Sanchez is on board with the Prolia plan. Sammi is comfortable with this plan.  She will wait to hear from Jimenez or myself on an update the exemestane.    Sheere Schroeder RN

## 2021-05-30 NOTE — TELEPHONE ENCOUNTER
Left message to call back for: Sammi  Information to relay to patient:  Rx has been sent into pharmacy.

## 2021-05-30 NOTE — TELEPHONE ENCOUNTER
I would recommend continuing the Prolia every 6 months.  We will repeat a bone density 2 years after her last one which would be in 2020.  If there is stability, she could perhaps then switch over to an oral agent.

## 2021-05-30 NOTE — PROGRESS NOTES
"OFFICE VISIT NOTE    Subjective:   Chief Complaint:  Follow-up    68-year-old woman with a past history of diabetes mellitus type 1 who was on insulin pump.  Also has a history of hypertension and coronary artery disease having past history of stent placement.  He is being treated for breast cancer.  Currently taking Arava-1 mg tablet daily.  Bone densities have shown osteopenia.  She is taking calcium vitamin D.  Is now been taking Prolia every 6 months.  She wonders if she should stay on shots.    Current Outpatient Medications   Medication Sig     anastrozole (ARIMIDEX) 1 mg tablet Take 1 tablet (1 mg total) by mouth daily.     aspirin 81 MG tablet Take 81 mg by mouth daily.     atorvastatin (LIPITOR) 40 MG tablet Take 1 tablet (40 mg total) by mouth at bedtime.     b complex vitamins tablet Take 1 tablet by mouth daily.     blood glucose test strips Dispense item covered by pt ins. Test 6 times a day     calcium carbonate (OS-PATTI) 600 mg (1,500 mg) tablet Take 1,200 mg by mouth bedtime. Plus magnesium      cholecalciferol, vitamin D3, (VITAMIN D3) 2,000 unit cap Take 2,000 Units by mouth bedtime.      denosumab 60 mg/mL Syrg Inject 60 mg under the skin.     insulin lispro (HUMALOG) 100 unit/mL injection 20 Units daily.     irbesartan-hydrochlorothiazide (AVALIDE) 150-12.5 mg per tablet Take 1 tablet by mouth daily.     lysine 500 mg Tab Take 500 mg by mouth 2 (two) times a day.     multivitamin (MULTIVITAMIN) per tablet Take 1 tablet by mouth daily.     OMEGA-3/DHA/EPA/FISH OIL (FISH OIL-OMEGA-3 FATTY ACIDS) 300-1,000 mg capsule Take 500 mg by mouth 2 (two) times a day.        Review of Systems:  A comprehensive review of systems is negative except for the comments above    Objective:    Pulse (!) 58   Ht 5' 3\" (1.6 m)   Wt 100 lb (45.4 kg)   SpO2 98%   BMI 17.71 kg/m    GENERAL: No acute distress.  She looks thin and healthy.  Vital signs are stable.  Blood pressure 118/68.  Pulse 66.  Weight is stable but " though she is quite thin.  I reviewed her bone density.  Vitamin D level was 53 3 years ago.    Assessment & Plan   Sammi Guadalupe is a 68 y.o. female.    Osteopenia.  Her medication she is taking for breast cancer can contribute to worsening bone disease.  I suggest she take at least 2 more Prolia shots then repeat her bone density next summer.  If there is stability, then one could just consider stopping Prolia.  If there is any worsening, then she should definitely stay on that medication.  She should continue current levels of calcium and vitamin D.  All questions were answered.    Diagnoses and all orders for this visit:    Osteopenia of necks of both femurs    Malignant neoplasm of lower-outer quadrant of both breasts in female, estrogen receptor positive (H)        Earl Altman MD  Transcription using voice recognition software, may contain typographical errors.

## 2021-05-30 NOTE — PROGRESS NOTES
Sammi came to chemo infusion this morning for her next dose of Denosumab.  VSS.  Pt assessed and is feeling well.  She is well educated on Denosumab.  She is taking  calcium as well as vitamin D supplements.  She received the Denosumab sq and tolerated it well.  Sammi d/c from clinic ambulatory and unaccompanied. She is aware of her future appointments.

## 2021-05-31 VITALS — WEIGHT: 104 LBS | HEIGHT: 63 IN | BODY MASS INDEX: 18.43 KG/M2

## 2021-05-31 VITALS — HEIGHT: 63 IN | WEIGHT: 105 LBS | BODY MASS INDEX: 18.61 KG/M2

## 2021-05-31 VITALS — WEIGHT: 105.5 LBS | BODY MASS INDEX: 18.69 KG/M2

## 2021-05-31 VITALS — BODY MASS INDEX: 18.33 KG/M2 | WEIGHT: 103.5 LBS

## 2021-05-31 NOTE — TELEPHONE ENCOUNTER
Pa was not needed for the Exemestane. She was approved for a francesca through the [employer] for $15,000 Id: [id number], GRP: [id number], BIN:[id number], PCN: [id number]. Through 06/22/2019-06/21/2020.I called the pharmacy and Sammi to inform them.

## 2021-06-01 VITALS — BODY MASS INDEX: 17.98 KG/M2 | WEIGHT: 101.5 LBS

## 2021-06-02 VITALS — WEIGHT: 102 LBS | BODY MASS INDEX: 18.07 KG/M2

## 2021-06-02 VITALS — BODY MASS INDEX: 17.89 KG/M2 | WEIGHT: 101 LBS | HEIGHT: 63 IN

## 2021-06-02 VITALS — BODY MASS INDEX: 17.54 KG/M2 | HEIGHT: 63 IN | WEIGHT: 99 LBS

## 2021-06-02 VITALS — HEIGHT: 63 IN | BODY MASS INDEX: 18.25 KG/M2 | WEIGHT: 103 LBS

## 2021-06-02 NOTE — PATIENT INSTRUCTIONS - HE
Advance Directive  Patient s advance directive was discussed and I am comfortable with the patient s wishes.  Patient Education   Personalized Prevention Plan  You are due for the preventive services outlined below.  Your care team is available to assist you in scheduling these services.  If you have already completed any of these items, please share that information with your care team to update in your medical record.  Health Maintenance   Topic Date Due     HEPATITIS C SCREENING  1950     ZOSTER VACCINES (2 of 2) 12/26/2011     DIABETES FOLLOW-UP  11/25/2017     DIABETES FOOT EXAM  05/25/2018     DIABETES OPHTHALMOLOGY EXAM  05/25/2018     INFLUENZA VACCINE RULE BASED (1) 08/01/2019     DIABETES URINE MICROALBUMIN  09/24/2019     MEDICARE ANNUAL WELLNESS VISIT  09/24/2019     PNEUMOCOCCAL IMMUNIZATION 65+ LOW/MEDIUM RISK (2 of 2 - PPSV23) 09/24/2019     TD 18+ HE  10/22/2019     DIABETES HEMOGLOBIN A1C  12/03/2019     DXA SCAN  06/28/2020     FALL RISK ASSESSMENT  07/09/2020     ADVANCE CARE PLANNING  09/24/2023     COLONOSCOPY  04/20/2025

## 2021-06-02 NOTE — TELEPHONE ENCOUNTER
Sammi had called back in and left a detailed message stating that she is slightly worried about this medication affecting her cholesterol.  She does follow with cardiology on a yearly basis and will see Dr. Gipson on December 11.  She had a lipid panel drawn on October 14 through Dr. Altman's office and all of her numbers looked good.  I let her know that we sent in a 30-day supply for her and since she just had a lipid panel drawn and they are stable, she should see Dr. Gipson and decide how frequently he would like her cholesterol checked while on this medication.  She is very comfortable with this plan.  I also let her know that I heard back from our pharmacy liaison who reports that she will have a $0 co-pay with the letrozole.  She is very happy to hear this.  She will call us if she has any side effects that she would like to discuss.    Sheree Schroeder RN

## 2021-06-02 NOTE — PROGRESS NOTES
Assessment and Plan:   68-year-old woman in for annual wellness visit and exam.    1. Routine general medical examination at a health care facility  Normal examination today.    2. Type 1 diabetes mellitus without complication (H)  She has an insulin pump and continuous glucose monitor.  She tells me her A1c is run 6.2.  She sees an endocrinologist.  - Thyroid Cascade    3. Atherosclerosis of native coronary artery of native heart without angina pectoris  She can jog 2 miles without chest pain.    4. Hypertension  Today's blood pressure is 108/76.    5. Malignant neoplasm of lower-outer quadrant of both breasts in female, estrogen receptor positive (H)  Sees an oncologist on a regular basis    6. S/P coronary artery stent placement  No angina etc.    7. Mixed hyperlipidemia  Currently is on a statin, Lipitor 40 mg  - Lipid Cascade    8. Age-related osteoporosis without current pathological fracture  Check vitamin D.  - Vitamin D, Total (25-Hydroxy)    9. Medication management  She does have some trouble with the drug exemestane.  It causes her to feel dizzy.  - HM1(CBC and Differential)  - Comprehensive Metabolic Panel  - HM1 (CBC with Diff)    10. Need for vaccination  Needs a flu shot.  - Influenza High Dose, Seasonal 65+ yrs    11. Encounter for hepatitis C screening test for low risk patient  Screening for hepatitis C.  No past history of same  - Hepatitis C Antibody (Anti-HCV)        The patient's current medical problems were reviewed.    I have had an Advance Directives discussion with the patient.  The following health maintenance schedule was reviewed with the patient and provided in printed form in the after visit summary:   Health Maintenance   Topic Date Due     HEPATITIS C SCREENING  1950     ZOSTER VACCINES (2 of 2) 12/26/2011     DIABETES FOLLOW-UP  11/25/2017     DIABETES FOOT EXAM  05/25/2018     DIABETES OPHTHALMOLOGY EXAM  05/25/2018     INFLUENZA VACCINE RULE BASED (1) 08/01/2019      DIABETES URINE MICROALBUMIN  09/24/2019     MEDICARE ANNUAL WELLNESS VISIT  09/24/2019     PNEUMOCOCCAL IMMUNIZATION 65+ LOW/MEDIUM RISK (2 of 2 - PPSV23) 09/24/2019     TD 18+ HE  10/22/2019     DIABETES HEMOGLOBIN A1C  12/03/2019     DXA SCAN  06/28/2020     FALL RISK ASSESSMENT  07/09/2020     ADVANCE CARE PLANNING  09/24/2023     COLONOSCOPY  04/20/2025        Subjective:   Chief Complaint: Sammi Guadalupe is an 68 y.o. female here for an Annual Wellness visit.   HPI: 68-year-old woman for annual wellness visit.  12 point system review is basically negative.  Diabetic for 14 years.  Has an insulin pump.  No neuropathy.  No TIAs.  No angina at this time.  She has had a stent placed in the past.  No TIAs.  No current musculoskeletal or joint issues.  No dermatologic problems.  No seizures.  No history of epilepsy.  No history of neuropathy or tremor.  Memory is been excellent.  No asthma or hayfever.      Review of Systems:    Please see above.  The rest of the review of systems are negative for all systems.    Patient Care Team:  Earl Altman MD as PCP - General (Internal Medicine)  Gagan Sanchez MD as Physician (Hematology and Oncology)  Andree Wray MD as Physician (General Surgery)  Yesenia Collier, RN as Registered Nurse (Hematology and Oncology)  Earl Altman MD as Assigned PCP  Omar Layne MD (Internal Medicine)     Patient Active Problem List   Diagnosis     Hypertension     Coronary Artery Disease     Malignant neoplasm of lower-outer quadrant of female breast (H)     Breast cancer, right breast (H)     Swelling of arm     DVT (deep venous thrombosis) (H)     Mixed hyperlipidemia     Age-related osteoporosis without current pathological fracture     Diabetes mellitus type I, controlled (H)     S/P coronary artery stent placement     Adverse effect of other drugs, medicaments and biological substances, sequela     Insulin pump in place     Past Medical History:    Diagnosis Date     ASHD (arteriosclerotic heart disease) 2012    CT scan, calcium score 428 lad artery deployment of a non-drug-eluting stent in the LAD     DVT (deep venous thrombosis) (H)      Family history of myocardial infarction       Past Surgical History:   Procedure Laterality Date     BACK SURGERY       CARPAL TUNNEL RELEASE Bilateral      LAMINECTOMY AND MICRODISCECTOMY CERVICAL SPINE N/A      LAPAROSCOPIC HYSTERECTOMY N/A 12/12/2016    Procedure: ROBOTIC TOTAL LAPAROSCOPIC HYSTERECTOMY, BILATERAL SALPINGO-OOPHORECTOMY, CYSTOSCOPY;  Surgeon: Sohan Ramirez MD;  Location: [facility] [place];  Service:      MASTECTOMY Bilateral       Family History   Problem Relation Age of Onset     Cancer Mother      Heart attack Mother      Cancer Father      Heart disease Father      CABG Father      Heart disease Sister       Social History     Socioeconomic History     Marital status: Single     Spouse name: Not on file     Number of children: Not on file     Years of education: Not on file     Highest education level: Not on file   Occupational History     Not on file   Social Needs     Financial resource strain: Not on file     Food insecurity:     Worry: Not on file     Inability: Not on file     Transportation needs:     Medical: Not on file     Non-medical: Not on file   Tobacco Use     Smoking status: Former Smoker     Packs/day: 0.50     Years: 10.00     Pack years: 5.00     Smokeless tobacco: Never Used   Substance and Sexual Activity     Alcohol use: Yes     Alcohol/week: 5.0 standard drinks     Types: 3 Glasses of wine, 2 Cans of beer per week     Drug use: No     Sexual activity: Never   Lifestyle     Physical activity:     Days per week: Not on file     Minutes per session: Not on file     Stress: Not on file   Relationships     Social connections:     Talks on phone: Not on file     Gets together: Not on file     Attends Jain service: Not on file     Active member of club or organization: Not on  file     Attends meetings of clubs or organizations: Not on file     Relationship status: Not on file     Intimate partner violence:     Fear of current or ex partner: Not on file     Emotionally abused: Not on file     Physically abused: Not on file     Forced sexual activity: Not on file   Other Topics Concern     Not on file   Social History Narrative     Not on file      Current Outpatient Medications   Medication Sig Dispense Refill     aspirin 81 MG tablet Take 81 mg by mouth daily.       atorvastatin (LIPITOR) 40 MG tablet Take 1 tablet (40 mg total) by mouth at bedtime. 90 tablet 3     b complex vitamins tablet Take 1 tablet by mouth daily.       blood glucose test strips Dispense item covered by pt ins. Test 6 times a day       calcium carbonate (OS-PATTI) 600 mg (1,500 mg) tablet Take 1,200 mg by mouth bedtime. Plus magnesium        cholecalciferol, vitamin D3, (VITAMIN D3) 2,000 unit cap Take 2,000 Units by mouth bedtime.        denosumab 60 mg/mL Syrg Inject 60 mg under the skin.       exemestane (AROMASIN) 25 mg tablet Take 1 tablet (25 mg total) by mouth daily. 90 tablet 3     insulin lispro (HUMALOG) 100 unit/mL injection 20 Units daily.       irbesartan-hydrochlorothiazide (AVALIDE) 150-12.5 mg per tablet Take 1 tablet by mouth daily. 90 tablet 3     lysine 500 mg Tab Take 500 mg by mouth 2 (two) times a day.       multivitamin (MULTIVITAMIN) per tablet Take 1 tablet by mouth daily.       OMEGA-3/DHA/EPA/FISH OIL (FISH OIL-OMEGA-3 FATTY ACIDS) 300-1,000 mg capsule Take 500 mg by mouth 2 (two) times a day.        No current facility-administered medications for this visit.       Objective:   Vital Signs: There were no vitals taken for this visit.     VisionScreening:  No exam data present     PHYSICAL EXAM    EYES: Eyelids, conjunctiva, and sclera were normal. Pupils were normal. Cornea, iris, and lens were normal bilaterally.  No diabetic changes.  HEAD, EARS, NOSE, MOUTH, AND THROAT: Head and face  were normal. Hearing was normal to voice and the ears were normal to external exam. Nose appearance was normal and there was no discharge. Oropharynx was normal.  NECK: Neck appearance was normal. There were no neck masses and the thyroid was not enlarged.  No bruits.  RESPIRATORY: Breathing pattern was normal and the chest moved symmetrically.  Percussion/auscultatory percussion was normal.  Lung sounds were normal and there were no abnormal sounds.  CARDIOVASCULAR: Heart rate and rhythm were normal.  S1 and S2 were normal and there were no extra sounds or murmurs. Peripheral pulses in arms and legs were normal.  Jugular venous pressure was normal.  There was no peripheral edema.  GASTROINTESTINAL: The abdomen was normal in contour.  Bowel sounds were present.  Percussion detected no organ enlargement or tenderness.  Palpation detected no tenderness, mass, or enlarged organs.  She has an insulin pump on the right lower quadrant.  Continuous glucose monitor left side of the.  MUSCULOSKELETAL: Skeletal configuration was normal and muscle mass was normal for age. Joint appearance was overall normal.  No synovitis.  LYMPHATIC: There were no enlarged nodes.  SKIN/HAIR/NAILS: Skin color was normal.  There were no skin lesions.  Hair and nails were normal.  NEUROLOGIC: The patient was alert and oriented to person, place, time, and circumstance. Speech was normal. Cranial nerves were normal. Motor strength was normal for age. The patient was normally coordinated.  Monofilament fiber sensation normal in the hands and feet.  PSYCHIATRIC:  Mood and affect were normal and the patient had normal recent and remote memory. The patient's judgment and insight were normal.    ADDITIONAL VITAL SIGNS: Pulse is 68.  CHEST WALL/BREASTS: Mastectomies in the past.  Breast cancer on the right side.  RECTAL: Not checked  GENITAL/URINARY: History of hysterectomy in the past.    Healthy thin woman in no distress.  Blood pressure 108/76.   Pulse 68 and regular.    Assessment Results 10/14/2019   Activities of Daily Living No help needed   Instrumental Activities of Daily Living No help needed   Get Up and Go Score Less than 12 seconds   Mini Cog Total Score 5   Some recent data might be hidden     A Mini-Cog score of 0-2 suggests the possibility of dementia, score of 3-5 suggests no dementia    Identified Health Risks:     Patient's advanced directive was discussed and I am comfortable with the patient's wishes.

## 2021-06-02 NOTE — TELEPHONE ENCOUNTER
Sammi calls in today to discuss concerns about how she is feeling on exemestane.  She has been on this since July 22, 2019.  Prior to that she was on anastrozole and discontinued that in early July due to side effects.  She states that she really wanted to push and be on this for at least 6 months and hoped that the side effects would go away. She said that she just cannot take it anymore.  She is having dizziness which happens when she is more active.  Once she rests, it goes away.  The bigger issue she is having is her emotions.  On a daily basis, she is having a period of sadness or depression where she is almost in tears daily.  She is also anxious.  She wants to know what to do next. Per Dr Sanchez, he wants her to stop the exemestane.  I will follow-up with her in a couple of weeks to see how she is feeling.  Once she is feeling more normal, we will send in letrozole for her.  He did also say that there may be one other option for her if letrozole does not agree with her. (Dr Sanchez would have to look further into her information and see if Tamoxifen would be good for her but again this is after she tries letrozole).    She was happy to hear this.  I let her know that I will call her in 2 weeks.    Sheree Schroeder RN

## 2021-06-02 NOTE — TELEPHONE ENCOUNTER
I called Sammi today to see how she is feeling after stopping her exemestane back on 10/15/2019.  She reports that she is feeling so much better.  The depression and sadness stopped almost immediately and the dizziness went away after about a week and a half.  She does report that she is trying another medication and see how her body handles it.  Per Dr. Sanchez, we will send off letrozole 2.5 mg daily #30.  This has been sent off to her pharmacy and also I have been in contact with neck, our pharmacy liaison to make sure it is affordable for her.  I let her know that either myself or neck will be in contact with her once we have more information for her.  She verbalized understanding and was appreciative of the follow-up.    Sheree Schroeder RN

## 2021-06-03 VITALS — WEIGHT: 103 LBS | BODY MASS INDEX: 18.25 KG/M2 | HEIGHT: 63 IN

## 2021-06-03 VITALS — BODY MASS INDEX: 17.71 KG/M2 | WEIGHT: 100 LBS

## 2021-06-03 VITALS — BODY MASS INDEX: 17.72 KG/M2 | WEIGHT: 100 LBS | HEIGHT: 63 IN

## 2021-06-03 VITALS
DIASTOLIC BLOOD PRESSURE: 76 MMHG | WEIGHT: 103 LBS | OXYGEN SATURATION: 98 % | BODY MASS INDEX: 18.25 KG/M2 | HEART RATE: 68 BPM | HEIGHT: 63 IN | SYSTOLIC BLOOD PRESSURE: 108 MMHG

## 2021-06-04 VITALS
HEART RATE: 68 BPM | RESPIRATION RATE: 14 BRPM | HEIGHT: 63 IN | BODY MASS INDEX: 18 KG/M2 | DIASTOLIC BLOOD PRESSURE: 60 MMHG | SYSTOLIC BLOOD PRESSURE: 110 MMHG | WEIGHT: 101.6 LBS

## 2021-06-04 VITALS
DIASTOLIC BLOOD PRESSURE: 67 MMHG | HEART RATE: 60 BPM | OXYGEN SATURATION: 100 % | BODY MASS INDEX: 17.89 KG/M2 | SYSTOLIC BLOOD PRESSURE: 129 MMHG | WEIGHT: 101 LBS | TEMPERATURE: 97.6 F

## 2021-06-04 VITALS
WEIGHT: 100.1 LBS | HEART RATE: 69 BPM | BODY MASS INDEX: 17.73 KG/M2 | SYSTOLIC BLOOD PRESSURE: 135 MMHG | DIASTOLIC BLOOD PRESSURE: 68 MMHG | TEMPERATURE: 97.8 F

## 2021-06-05 ENCOUNTER — RECORDS - HEALTHEAST (OUTPATIENT)
Dept: CARDIOLOGY | Facility: CLINIC | Age: 71
End: 2021-06-05

## 2021-06-05 ENCOUNTER — RECORDS - HEALTHEAST (OUTPATIENT)
Dept: ONCOLOGY | Facility: CLINIC | Age: 71
End: 2021-06-05

## 2021-06-05 ENCOUNTER — HEALTH MAINTENANCE LETTER (OUTPATIENT)
Age: 71
End: 2021-06-05

## 2021-06-05 VITALS
DIASTOLIC BLOOD PRESSURE: 74 MMHG | WEIGHT: 101 LBS | OXYGEN SATURATION: 96 % | SYSTOLIC BLOOD PRESSURE: 124 MMHG | HEIGHT: 63 IN | BODY MASS INDEX: 17.89 KG/M2

## 2021-06-05 VITALS
OXYGEN SATURATION: 99 % | WEIGHT: 102.6 LBS | TEMPERATURE: 97.4 F | DIASTOLIC BLOOD PRESSURE: 69 MMHG | HEART RATE: 64 BPM | BODY MASS INDEX: 18.17 KG/M2 | SYSTOLIC BLOOD PRESSURE: 143 MMHG

## 2021-06-05 VITALS
HEIGHT: 63 IN | HEART RATE: 76 BPM | SYSTOLIC BLOOD PRESSURE: 122 MMHG | BODY MASS INDEX: 17.89 KG/M2 | DIASTOLIC BLOOD PRESSURE: 70 MMHG | RESPIRATION RATE: 16 BRPM | WEIGHT: 101 LBS

## 2021-06-05 VITALS
HEIGHT: 63 IN | HEART RATE: 68 BPM | DIASTOLIC BLOOD PRESSURE: 64 MMHG | TEMPERATURE: 97.8 F | OXYGEN SATURATION: 97 % | WEIGHT: 101 LBS | SYSTOLIC BLOOD PRESSURE: 108 MMHG | BODY MASS INDEX: 17.89 KG/M2

## 2021-06-05 VITALS — WEIGHT: 101 LBS | BODY MASS INDEX: 17.89 KG/M2 | HEIGHT: 63 IN

## 2021-06-05 DIAGNOSIS — E10.9 TYPE I DIABETES MELLITUS (H): ICD-10-CM

## 2021-06-05 DIAGNOSIS — I25.10 CORONARY ATHEROSCLEROSIS: ICD-10-CM

## 2021-06-05 DIAGNOSIS — C50.519 MALIGNANT NEOPLASM OF LOWER-OUTER QUADRANT OF FEMALE BREAST (H): ICD-10-CM

## 2021-06-05 DIAGNOSIS — C50.919 MALIGNANT NEOPLASM OF BREAST (FEMALE) (H): ICD-10-CM

## 2021-06-05 NOTE — PROGRESS NOTES
Pt arrived ambulatory to clinic for SQ Prolia injection.  Administered SQ injection into KIRILL per MD order.  Pt tolerated procedure well, no s/s of bleeding or swelling at site.  Pt verbalized understanding of plan of care and return to clinic.

## 2021-06-05 NOTE — TELEPHONE ENCOUNTER
Refill Approved    Rx renewed per Medication Renewal Policy. Medication was last renewed on 2/2/19.    Batsheva Boyd, [facility] Triage/Med Refill 1/25/2020     Requested Prescriptions   Pending Prescriptions Disp Refills     irbesartan-hydrochlorothiazide (AVALIDE) 150-12.5 mg per tablet [Pharmacy Med Name: IRBESARTAN/HCTZ 150-12.5MG TABLETS] 90 tablet 3     Sig: TAKE 1 TABLET BY MOUTH DAILY       Diuretics/Combination Diuretics Refill Protocol  Passed - 1/24/2020 10:13 AM        Passed - Visit with PCP or prescribing provider visit in past 12 months     Last office visit with prescriber/PCP: 7/22/2019 Earl Altman MD OR same dept: 7/22/2019 Earl Altman MD OR same specialty: 7/22/2019 Earl Altman MD  Last physical: 10/14/2019 Last MTM visit: Visit date not found   Next visit within 3 mo: Visit date not found  Next physical within 3 mo: Visit date not found  Prescriber OR PCP: Earl Altman MD  Last diagnosis associated with med order: 1. Hypertension  - irbesartan-hydrochlorothiazide (AVALIDE) 150-12.5 mg per tablet [Pharmacy Med Name: IRBESARTAN/HCTZ 150-12.5MG TABLETS]; Take 1 tablet by mouth daily.  Dispense: 90 tablet; Refill: 3    2. Mixed hyperlipidemia  - atorvastatin (LIPITOR) 40 MG tablet [Pharmacy Med Name: ATORVASTATIN 40MG TABLETS]; TAKE 1 TABLET(40 MG) BY MOUTH AT BEDTIME  Dispense: 90 tablet; Refill: 3    If protocol passes may refill for 12 months if within 3 months of last provider visit (or a total of 15 months).             Passed - Serum Potassium in past 12 months      Lab Results   Component Value Date    Potassium 3.6 01/23/2020             Passed - Serum Sodium in past 12 months      Lab Results   Component Value Date    Sodium 137 01/23/2020             Passed - Blood pressure on file in past 12 months     BP Readings from Last 1 Encounters:   01/23/20 129/67             Passed - Serum Creatinine in past 12 months      Creatinine   Date Value Ref Range Status    01/23/2020 0.71 0.60 - 1.10 mg/dL Final             atorvastatin (LIPITOR) 40 MG tablet [Pharmacy Med Name: ATORVASTATIN 40MG TABLETS] 90 tablet 3     Sig: TAKE 1 TABLET(40 MG) BY MOUTH AT BEDTIME       Statins Refill Protocol (Hmg CoA Reductase Inhibitors) Passed - 1/24/2020 10:13 AM        Passed - PCP or prescribing provider visit in past 12 months      Last office visit with prescriber/PCP: 7/22/2019 Earl Altman MD OR same dept: 7/22/2019 Earl Altman MD OR same specialty: 7/22/2019 Earl Altman MD  Last physical: 10/14/2019 Last MTM visit: Visit date not found   Next visit within 3 mo: Visit date not found  Next physical within 3 mo: Visit date not found  Prescriber OR PCP: Earl Altman MD  Last diagnosis associated with med order: 1. Hypertension  - irbesartan-hydrochlorothiazide (AVALIDE) 150-12.5 mg per tablet [Pharmacy Med Name: IRBESARTAN/HCTZ 150-12.5MG TABLETS]; Take 1 tablet by mouth daily.  Dispense: 90 tablet; Refill: 3    2. Mixed hyperlipidemia  - atorvastatin (LIPITOR) 40 MG tablet [Pharmacy Med Name: ATORVASTATIN 40MG TABLETS]; TAKE 1 TABLET(40 MG) BY MOUTH AT BEDTIME  Dispense: 90 tablet; Refill: 3    If protocol passes may refill for 12 months if within 3 months of last provider visit (or a total of 15 months).

## 2021-06-05 NOTE — PROGRESS NOTES
[facility] Hematology and Oncology Progress Note    Patient: Sammi Guadalupe  MRN: [id number]  Date of Service:         Reason for Visit    Chief Complaint   Patient presents with     HE Cancer     Malignant neoplasm of lower-outer quadrant of both breasts       Assessment and Plan    T1 N0 M0, stage I right breast cancer, HER-2 positive by RT-PCR and fish, Oncotype score of 30, tumor ER/MO positive, diagnosis October 2013  Myalgias  Osteopenia    Patient doing well with no evidence of recurrence of breast cancer.  We will do follow-up again in 6 months.    We stopped anastrozole with resolution of her joint symptoms and back pain.  She did try exemestane briefly but had significant depression symptoms.  Currently on letrozole which she is tolerating fine.  She will continue the medication.  Our plan is to complete a total of 7 years of adjuvant hormonal therapy if she can tolerate the letrozole.    She will get Prolia injection today.  She will continue calcium with vitamin D.  We will check labs and plan another injection when she returns in 6 months.      Plan: Continue letrozole  Check lipid profile  Continue Prolia  Continue calcium with vitamin D  Follow-up in 6 months      Measurable disease: None postoperatively        Current therapy: Letrozole 2.5 mg daily since July 2019     TREATMENT HISTORY:        Anastrozole 1 mg by mouth daily started in April 2016 and stopped in July 2019    Tamoxifen 20 mg daily since May 2014      1 year of Herceptin completed in February 2015    Taxotere carboplatin and Herceptin for 5 cycles completed in April 2014    One cycle of Taxotere and cyclophosphamide started January 30, 2014    Bilateral mastectomy in October 2013       Malignant neoplasm of lower-outer quadrant of female breast    Staging form: Breast, AJCC 7th Edition      Clinical: Stage IA (T1, N0, cM0) - Signed by Gagan Sanchez MD on 6/3/2014    ECOG Performance   ECOG Performance Status:  0    Distress Assessment  Distress Assessment Score: No distress    Pain           Problem List    1. Mixed hyperlipidemia  Lipid Cascade        CC: Earl Altman MD    ______________________________________________________________________________    History of Present Illness    Ms. Sammi Guadalupe is here for reevaluation.  She was seen 6 months ago.  Was having significant joint symptoms for which we stopped anastrozole with improvement.  She tried exemestane but had significant depression.  Now on letrozole which she is tolerating much better.  She would like lipid profile rechecked while on letrozole.  No headaches or dizziness.  No shortness of breath or cough.  No new bone or abdominal pain.  ECOG status is 0.        Pain Status  Currently in Pain: No/denies    Review of Systems    Constitutional  Constitutional (WDL): Exceptions to WDL  Neurosensory  Neurosensory (WDL): Exceptions to WDL  Peripheral Sensory Neuropathy: Asymptomatic, loss of deep tendon reflexes or paresthesia(Numbness to feet. )  Cardiovascular  Cardiovascular (WDL): All cardiovascular elements are within defined limits  Pulmonary  Respiratory (WDL): Within Defined Limits  Gastrointestinal  Gastrointestinal (WDL): All gastrointestinal elements are within defined limits  Genitourinary  Genitourinary (WDL): All genitourinary elements are within defined limits  Integumentary  Integumentary (WDL): All integumentary elements are within defined limits  Patient Coping  Patient Coping: Accepting  Distress Assessment  Distress Assessment Score: No distress  Accompanied by  Accompanied by: Alone    Past History  Past Medical History:   Diagnosis Date     ASHD (arteriosclerotic heart disease) 2012    CT scan, calcium score 428 lad artery deployment of a non-drug-eluting stent in the LAD     DVT (deep venous thrombosis) (H)      Family history of myocardial infarction          Past Surgical History:   Procedure Laterality Date     BACK SURGERY        CARPAL TUNNEL RELEASE Bilateral      LAMINECTOMY AND MICRODISCECTOMY CERVICAL SPINE N/A      LAPAROSCOPIC HYSTERECTOMY N/A 12/12/2016    Procedure: ROBOTIC TOTAL LAPAROSCOPIC HYSTERECTOMY, BILATERAL SALPINGO-OOPHORECTOMY, CYSTOSCOPY;  Surgeon: Sohan Ramirez MD;  [place] [facility] [place]  Service:      MASTECTOMY Bilateral        Physical Exam    Recent Vitals 1/23/2020   Height -   Weight 101 lbs   BSA (m2) 1.43 m2   /67   Pulse 60   Temp 97.6   Temp src 1   SpO2 100   Some recent data might be hidden       GENERAL: Alert and oriented. Seated comfortably. In no distress.    HEAD: Atraumatic and normocephalic.  Has a full head of hair.    EYES: MINH, EOMI.  No pallor.  No icterus.    Oral cavity: no mucosal lesion or tonsillar enlargement.    NECK: supple. JVP normal.  No thyroid enlargement.    LYMPH NODES: No palpable, cervical, axillary or inguinal lymphadenopathy.    CHEST: clear to auscultation bilaterally.  Resonant to percussion throughout bilaterally.  Symmetrical breath movements bilaterally.    CVS: S1 and S2 are heard. Regular rate and rhythm.  No murmur or gallop or rub heard.    Breasts: She is status post bilateral mastectomies.  No locally recurrent disease.  No axillary adenopathy.    ABDOMEN: Soft. Not tender. Not distended.  No palpable hepatomegaly or splenomegaly.  No other mass palpable.  Bowel sounds heard.    EXTREMITIES: Warm.  No peripheral edema.  No tenderness to palpation over the upper arms    SKIN: no rash, or bruising or purpura.        Lab Results    No results found for this or any previous visit (from the past 168 hour(s)).    Imaging    No results found.      Signed by: Gagan Sanchez MD

## 2021-06-06 NOTE — PATIENT INSTRUCTIONS - HE
You are being tested for Corona virus     We will call you with your results.    Isolate Yourself:    Isolate yourself while traveling.    Do Not allow any visitors within 6 feet.    Do Not go to work or school.    Do Not go to Temple,  centers, shopping, or other public places.    Do Not shake hands.    Avoid close contact with others (hugging, kissing).    Protect Others:    Cover Your Mouth and Nose with a mask, disposable tissue or wash cloth to avoid spreading germs to others.    Wash your hands and face frequently with soap and water    Call Back If: Breathing difficulty develops or you become worse.    For more information about COVID19 and options for caring for yourself at home, please visit the CDC website at https://www.cdc.gov/coronavirus/2019-ncov/about/steps-when-sick.html  For more options for care at [facility], please visit our website at [email or web address]

## 2021-06-07 NOTE — TELEPHONE ENCOUNTER
Coronavirus (COVID-19) Notification    Reason for call  Notify of Negative COVID-19 lab result, assess symptoms,  review [facility] recommendations    Lab Result    Lab test 2019-nCoV rRt-PCR  Oropharyngeal AND/OR nasopharyngeal swabs were NEGATIVE for 2019-nCoV RNA    RN Recommendations/Instructions per [facility]  Patient notified of Negative COVID-19.    Patient can discontinue Quarantee and is free to resume normal activites.  If Patient has questions that you are not able to answer they can contact PCP or [facility] hotline ([phone])    Please Contact your PCP clinic or return to the Emergency department if your:    Symptoms worsen or other concerning symptom's.      {Name]  Rolf Traylor LPN

## 2021-06-08 NOTE — PROGRESS NOTES
Chief Complaint   Patient presents with     Fever     Cold,sinus pressure        HPI:    Patient is here for 3-4 days of nasal discharge and congestion, with facial pressure, associated with a fever of 101 this AM, for which she took Ibuprofen with relief. NO cough, chest pain, sore throat, ear pain.     ROS: Pertinent ROS noted in HPI.     Allergies   Allergen Reactions     Nitroglycerin Other (See Comments)     Blood Pressure drops significantly.     Sulfa (Sulfonamide Antibiotics) Dizziness     Dizziness and doesn't feel normal.       Patient Active Problem List   Diagnosis     Hypertension     Coronary Artery Disease     Malignant neoplasm of lower-outer quadrant of female breast     Breast cancer, right breast     Swelling of arm     DVT (deep venous thrombosis)     Diabetes mellitus type 2, insulin dependent     Mixed hyperlipidemia       Family History   Problem Relation Age of Onset     Cancer Mother      Cancer Father        Social History     Social History     Marital status:      Spouse name: N/A     Number of children: N/A     Years of education: N/A     Occupational History     Not on file.     Social History Main Topics     Smoking status: Former Smoker     Packs/day: 0.50     Years: 10.00     Smokeless tobacco: Never Used     Alcohol use 3.0 oz/week     3 Glasses of wine, 2 Cans of beer per week     Drug use: No     Sexual activity: No     Other Topics Concern     Not on file     Social History Narrative         Objective:    Vitals:    02/04/17 1018   BP: 100/62   Pulse: 70   Resp: 18   Temp: 97.5  F (36.4  C)   SpO2: 99%       Gen: NAD  Throat: normal  Ears: normal TMs and canals  Nose:  Congested and inflamed nasal mucosa  Sinus: tenderness to percussion of right maxillary sinus  Neck: no adenopathy  CV: RRR, no M, R, G  Pulm: CTAB, normal effort        Acute sinusitis  -     amoxicillin (AMOXIL) 875 MG tablet; Take 1 tablet (875 mg total) by mouth 2 (two) times a day for 10  days.      Amoxicillin to be taken only if symptoms don't improve in 5 days, or fever is persistent.       Supportive cares and follow up as directed.

## 2021-06-09 NOTE — PROGRESS NOTES
"Sammi Guadalupe is a 69 y.o. female who is being evaluated via a billable video visit.      The patient has been notified of following:     \"This video visit will be conducted via a call between you and your physician/provider. We have found that certain health care needs can be provided without the need for an in-person physical exam.  This service lets us provide the care you need with a video conversation.  If a prescription is necessary we can send it directly to your pharmacy.  If lab work is needed we can place an order for that and you can then stop by our lab to have the test done at a later time.    Video visits are billed at different rates depending on your insurance coverage. Please reach out to your insurance provider with any questions.    If during the course of the call the physician/provider feels a video visit is not appropriate, you will not be charged for this service.\"    Patient has given verbal consent to a Video visit? Yes    Will anyone else be joining your video visit? No       Video visit was attempted.  This could not be completed so was switched to a telephone visit.  Patient found a deer tick embedded in the genital region this morning.  Probably contacted the check yesterday.  She has had Lyme disease in the past.  She has no symptoms at this time.  Tick was removed in its entirety.    Assessment   deer tick bite  Plan doxycycline 200 mg p.o. today.  Additional provider notes: Patient agrees with the plan.  She will get the prescription today.      Video-Visit Details    Type of service:  Video Visit    Video End Time (time video stopped): Total telephone time 8 minutes  Originating Location (pt. Location): Home    Distant Location (provider location):  [facility] INTERNAL MEDICINE     Platform used for Video Visit: Teikon [phone]      Antonette Caicedo [facility]    "

## 2021-06-09 NOTE — PROGRESS NOTES
Sammi Guadalupe, female, 69 y.o., arrived to clinic at 0930 for Prolia injection. Prolia administered SQ into L arm, per provider order. She tolerated injection well and site covered with bandaid.   Sammi Guadalupe discharged to [facility] at 1115 alert, oriented and in stable condition.

## 2021-06-09 NOTE — TELEPHONE ENCOUNTER
COVID 19 Nurse Triage Plan/Patient Instructions    Please be aware that novel coronavirus (COVID-19) may be circulating in the community. If you develop symptoms such as fever, cough, or SOB or if you have concerns about the presence of another infection including coronavirus (COVID-19), please contact your health care provider or visit [email or web address].     Disposition/Instructions    Patient to schedule a Virtual Visit with provider. Reference Visit Selection Guide.    Thank you for taking steps to prevent the spread of this virus.  o Limit your contact with others.  o Wear a simple mask to cover your cough.  o Wash your hands well and often.    Resources    [facility]: About COVID-19: [email or web address]    CDC: What to Do If You're Sick: www.cdc.gov/coronavirus/2019-ncov/about/steps-when-sick.html    CDC: Ending Home Isolation: www.cdc.gov/coronavirus/2019-ncov/hcp/disposition-in-home-patients.html     CDC: Caring for Someone: www.cdc.gov/coronavirus/2019-ncov/if-you-are-sick/care-for-someone.html     [facility]: Interim Guidance for Hospital Discharge to Home: www.health.Counts include 234 beds at the [facility].[place]/diseases/coronavirus/hcp/hospdischarge.pdf    [facility] clinical trials (COVID-19 research studies): clinicalaffairs.[facility]-clinical-trials     Below are the COVID-19 hotlines at the [place] Department of [facility] ([facility]). Interpreters are available.   o For health questions: Call 568-006-0733 or 1-325.470.3461 (7 a.m. to 7 p.m.)  o For questions about schools and childcare: Call 238-961-2303 or 1-293.951.6685 (7 a.m. to 7 p.m.)   RN triage   Call from pt   Pt states she removed deer tick this AM  -- from labia   Deer tick was flat   She thinks maybe got tick yesterday -- or possibly last weekend   No fever/rash/bull's eye/ headache   Pt has had lyme's in the past and would like to be treated   Reviewed home care advice   Transferred to   Lilian Laguerre RN [facility] RN triage      Reason  for Disposition    Tick bite with no complications    Additional Information    Negative: Sounds like a life-threatening emergency to the triager    Negative: Not a tick bite    Negative: [1] 2 to 14 days following tick bite AND [2] severe headache with fever occurs    Negative: [1] 2 to 14 days following tick bite AND [2] widespread rash with fever occurs    Negative: Patient sounds very sick or weak to the triager    Negative: [1] Fever AND [2] red area    Negative: [1] Fever AND [2] area is very tender to touch    Negative: [1] Red streak or red line AND [2] length > 2 inches (5 cm)    Negative: Can't remove live tick (after trying Care Advice)    Negative: Can't remove tick's head that was broken off in the skin (after trying Care Advice)    Negative: [1] 2 to 14 days following tick bite AND [2] fever AND [3] no rash or headache    Negative: [1] 2 to 14 days following tick bite AND [2] widespread rash or headache AND [3] no fever    Negative: [1] Red or very tender (to touch) area AND [2] started over 24 hours after the bite    Negative: Red ring or bull's-eye rash occurs at tick bite    Negative: [1] Scab is present AND [2] it drains pus or increases in size AND [3] not improved after applying antibiotic ointment for 2 days    Negative: [1] Probable deer tick AND [2] attached > 36 hours (or tick appears swollen, not flat) AND [3] occurred in an area where Lyme disease is common    Protocols used: TICK BITE-A-

## 2021-06-09 NOTE — PROGRESS NOTES
[facility] Hematology and Oncology Progress Note    Patient: Sammi Guadalupe  MRN: [id number]  Date of Service:         Reason for Visit    Chief Complaint   Patient presents with     HE Cancer       Assessment and Plan    T1 N0 M0, stage I right breast cancer, HER-2 positive by RT-PCR and fish, Oncotype score of 30, tumor ER/IN positive, diagnosis October 2013  Myalgias  Osteopenia    Patient feeling well with no clinical concern for recurrence of breast cancer.  We will see her again in 6 months for reevaluation.    She is having side effects of pain and fatigue with letrozole.  We will stop the medication and switch again to tamoxifen 20 mg p.o. daily.  Reviewed rare side effects of blood clots and uterine cancer and to call us for any concerning symptoms.  Plan is to continue hormonal therapy for 1 more year through May 2021.    She will get Prolia again today.  We will repeat DEXA scan before she returns.    Plan: Switched to tamoxifen 20 mg p.o. daily  Continue Prolia  Continue calcium with vitamin D  Follow-up in 6 months with DEXA scan      Measurable disease: None postoperatively        Current therapy: Letrozole 2.5 mg daily since July 2019     TREATMENT HISTORY:        Anastrozole 1 mg by mouth daily started in April 2016 and stopped in July 2019    Tamoxifen 20 mg daily since May 2014      1 year of Herceptin completed in February 2015    Taxotere carboplatin and Herceptin for 5 cycles completed in April 2014    One cycle of Taxotere and cyclophosphamide started January 30, 2014    Bilateral mastectomy in October 2013       Malignant neoplasm of lower-outer quadrant of female breast    Staging form: Breast, AJCC 7th Edition      Clinical: Stage IA (T1, N0, cM0) - Signed by Gagan Sanchez MD on 6/3/2014    ECOG Performance   ECOG Performance Status: 0    Distress Assessment  Distress Assessment Score: 3    Pain           Problem List    1. Malignant neoplasm of lower-outer quadrant of both  breasts in female, estrogen receptor positive (H)  tamoxifen (NOLVADEX) 20 MG tablet   2. Age-related osteoporosis without current pathological fracture  DXA Bone Density Scan    DXA Bone Density Scan        CC: Earl Altman MD    ______________________________________________________________________________    History of Present Illness    Ms. Sammi Guadalupe is here for reevaluation.  She was seen 6 months ago.  Currently on letrozole and is now having symptoms of joint discomfort and fatigue with the medication.  Asking about switching to alternate options.  No headaches.  No shortness of breath or cough.  No chest wall changes.  No new bone or abdominal pain.  ECOG status is 0.      Pain Status  Currently in Pain: No/denies    Review of Systems    Constitutional  Fatigue: Fatigue relieved by rest  Neurosensory  Neurosensory (WDL): All neurosensory elements are within defined limits  Cardiovascular  Cardiovascular (WDL): All cardiovascular elements are within defined limits  Pulmonary  Respiratory (WDL): Within Defined Limits  Gastrointestinal     Genitourinary  Genitourinary (WDL): All genitourinary elements are within defined limits  Integumentary  Integumentary (WDL): All integumentary elements are within defined limits  Patient Coping  Patient Coping: Accepting;Anxiety  Distress Assessment  Distress Assessment Score: 3  Accompanied by  Accompanied by: Alone    Past History  Past Medical History:   Diagnosis Date     ASHD (arteriosclerotic heart disease) 2012    CT scan, calcium score 428 lad artery deployment of a non-drug-eluting stent in the LAD     DVT (deep venous thrombosis) (H)      Family history of myocardial infarction          Past Surgical History:   Procedure Laterality Date     BACK SURGERY       CARPAL TUNNEL RELEASE Bilateral      LAMINECTOMY AND MICRODISCECTOMY CERVICAL SPINE N/A      LAPAROSCOPIC HYSTERECTOMY N/A 12/12/2016    Procedure: ROBOTIC TOTAL LAPAROSCOPIC HYSTERECTOMY,  BILATERAL SALPINGO-OOPHORECTOMY, CYSTOSCOPY;  Surgeon: Sohan Ramirez MD;  Location: [facility];  Service:      MASTECTOMY Bilateral        Physical Exam    Recent Vitals 7/23/2020   Height -   Weight 100 lbs 2 oz   BSA (m2) 1.42 m2   /68   Pulse 69   Temp 97.8   Temp src -   SpO2 -   Some recent data might be hidden       GENERAL: Alert and oriented. Seated comfortably. In no distress.    HEAD: Atraumatic and normocephalic.  Has a full head of hair.    EYES: MINH, EOMI.  No pallor.  No icterus.    Oral cavity: no mucosal lesion or tonsillar enlargement.    NECK: supple. JVP normal.  No thyroid enlargement.    LYMPH NODES: No palpable, cervical, axillary or inguinal lymphadenopathy.    CHEST: clear to auscultation bilaterally.  Resonant to percussion throughout bilaterally.  Symmetrical breath movements bilaterally.    CVS: S1 and S2 are heard. Regular rate and rhythm.  No murmur or gallop or rub heard.    Breasts: She is status post bilateral mastectomies.  No locally recurrent disease.  No axillary adenopathy.    ABDOMEN: Soft. Not tender. Not distended.  No palpable hepatomegaly or splenomegaly.  No other mass palpable.  Bowel sounds heard.    EXTREMITIES: Warm.  No peripheral edema.  No tenderness to palpation over the upper arms    SKIN: no rash, or bruising or purpura.        Lab Results    Recent Results (from the past 168 hour(s))   Comprehensive Metabolic Panel   Result Value Ref Range    Sodium 140 136 - 145 mmol/L    Potassium 3.7 3.5 - 5.0 mmol/L    Chloride 101 98 - 107 mmol/L    CO2 30 22 - 31 mmol/L    Anion Gap, Calculation 9 5 - 18 mmol/L    Glucose 106 70 - 125 mg/dL    BUN 14 8 - 22 mg/dL    Creatinine 0.73 0.60 - 1.10 mg/dL    GFR MDRD Af Amer >60 >60 mL/min/1.73m2    GFR MDRD Non Af Amer >60 >60 mL/min/1.73m2    Bilirubin, Total 1.1 (H) 0.0 - 1.0 mg/dL    Calcium 9.5 8.5 - 10.5 mg/dL    Protein, Total 7.0 6.0 - 8.0 g/dL    Albumin 4.4 3.5 - 5.0 g/dL    Alkaline Phosphatase 51 45 -  120 U/L    AST 37 0 - 40 U/L    ALT 24 0 - 45 U/L       Imaging    No results found.      Signed by: Gagan Sanchez MD

## 2021-06-11 NOTE — TELEPHONE ENCOUNTER
Who is calling:  The patient   Reason for Call:  The patient would like to know if she should schedule a physical which she is due 10/2020 or should she schedule labs only?  Date of last appointment with primary care: 6/24/20  Okay to leave a detailed message: Yes  #[phone]

## 2021-06-11 NOTE — PROGRESS NOTES
[facility] Hematology and Oncology Progress Note    Patient: Sammi Guadalupe  MRN: [id number]  Date of Service:         Reason for Visit    Chief Complaint   Patient presents with     HE Cancer     Malignant neoplasm of lower-outer quadrant of female breast       Assessment and Plan    T1 N0 M0, stage I right breast cancer, HER-2 positive by RT-PCR and fish, Oncotype score of 30, tumor ER/OK positive, diagnosis October 2013  Myalgias  Osteopenia    Patient is doing well with no evidence of recurrence of her cancer.  Myalgias could be related to anastrozole.  She will continue anastrozole and Fosamax and calcium with vitamin D.  I will see her again in 6 months for a follow-up.  I think bone density can probably be repeated in April 2018.    I discussed continuing adjuvant hormonal therapy for a total of at least 5 years through May 2019.  We could consider additional therapy as there is data to support this.    Plan: Continue anastrozole  Follow-up in 6 months      Measurable disease: None postoperatively        Current therapy: Anastrozole 1 mg by mouth daily started in April 2016 Tamoxifen 20 mg daily since May 2014        TREATMENT HISTORY: 1 year of Herceptin completed in February 2015    Taxotere carboplatin and Herceptin for 5 cycles completed in April 2014    One cycle of Taxotere and cyclophosphamide started January 30, 2014    Bilateral mastectomy in October 2013       Malignant neoplasm of lower-outer quadrant of female breast    Staging form: Breast, AJCC 7th Edition      Clinical: Stage IA (T1, N0, cM0) - Signed by Gagan Sanchez MD on 6/3/2014    ECOG Performance   ECOG Performance Status: 0    Distress Assessment  Distress Assessment Score: No distress    Pain  Pain Score (Initial OR Reassessment): 1        Problem List    1. Malignant neoplasm of lower-outer quadrant of female breast          CC: Earl Altman,  MD    ______________________________________________________________________________    History of Present Illness    Ms. Sammi Guadalupe is here for reevaluation.  She was seen last December.  She continues on anastrozole.  She reports less hot flashes with this.  She is noted some discomfort in the muscles of the upper arms and in the areas around the elbows.  No headaches or dizziness.  No shortness of breath or cough.  No new bone or abdominal pain.  No change with bowels or urine.  No bleeding or rash.  ECOG status is 0.    Pain Status  Currently in Pain: Yes    Review of Systems    Constitutional  Constitutional (WDL): All constitutional elements are within defined limits  Neurosensory  Neurosensory (WDL): Exceptions to WDL  Peripheral Sensory Neuropathy: Asymptomatic, loss of deep tendon reflexes or paresthesia (Fingers and toes. Numbness. Slightly better than last assessed.)  Cardiovascular  Cardiovascular (WDL): All cardiovascular elements are within defined limits  Pulmonary  Respiratory (WDL): Within Defined Limits  Gastrointestinal  Gastrointestinal (WDL): All gastrointestinal elements are within defined limits  Genitourinary  Genitourinary (WDL): All genitourinary elements are within defined limits  Integumentary  Integumentary (WDL): All integumentary elements are within defined limits  Patient Coping  Patient Coping: Accepting  Distress Assessment  Distress Assessment Score: No distress  Accompanied by  Accompanied by: Alone    Past History  Past Medical History:   Diagnosis Date     ASHD (arteriosclerotic heart disease) 2012    CT scan, calcium score 428 lad artery deployment of a non-drug-eluting stent in the LAD     Breast cancer     right     Coronary artery disease      Diabetes mellitus     type 2     DVT (deep venous thrombosis)      Hyperlipidemia      Hypertension          Past Surgical History:   Procedure Laterality Date     BACK SURGERY       CARPAL TUNNEL RELEASE Bilateral       LAMINECTOMY AND MICRODISCECTOMY CERVICAL SPINE N/A      LAPAROSCOPIC HYSTERECTOMY N/A 12/12/2016    Procedure: ROBOTIC TOTAL LAPAROSCOPIC HYSTERECTOMY, BILATERAL SALPINGO-OOPHORECTOMY, CYSTOSCOPY;  Surgeon: Sohan Ramirez MD;  Location: [facility] [place];  Service:      MASTECTOMY Bilateral        Physical Exam    Recent Vitals 7/3/2017   Height -   Weight 103 lbs 8 oz   BSA (m2) -   /70   Pulse 68   Temp 97.9   Temp src 1   SpO2 100       GENERAL: Alert and oriented. Seated comfortably. In no distress.    HEAD: Atraumatic and normocephalic.  Has a full head of hair.    EYES: MINH, EOMI.  No pallor.  No icterus.    Oral cavity: no mucosal lesion or tonsillar enlargement.    NECK: supple. JVP normal.  No thyroid enlargement.    LYMPH NODES: No palpable, cervical, axillary or inguinal lymphadenopathy.    CHEST: clear to auscultation bilaterally.  Resonant to percussion throughout bilaterally.  Symmetrical breath movements bilaterally.    CVS: S1 and S2 are heard. Regular rate and rhythm.  No murmur or gallop or rub heard.    Breasts: She is status post bilateral mastectomies.  No locally recurrent disease.  No axillary adenopathy.    ABDOMEN: Soft. Not tender. Not distended.  No palpable hepatomegaly or splenomegaly.  No other mass palpable.  Bowel sounds heard.    EXTREMITIES: Warm.  No peripheral edema.  No tenderness to palpation over the upper arms    SKIN: no rash, or bruising or purpura.        Lab Results    No results found for this or any previous visit (from the past 168 hour(s)).    Imaging    No results found.      Signed by: Gagan Sanchez MD

## 2021-06-11 NOTE — PROGRESS NOTES
OFFICE VISIT NOTE    Subjective:   Chief Complaint:  Insect Bite (tick bite right hand 3 days ago)    66-year-old woman is a diabetic on insulin.  She recently pulled a small deer tick off her right hand.  She is unsure how long it was attached but it was difficult to get off and she needed a tweezers.  There is been no rash.  There is been no fevers or chills.    Current Outpatient Prescriptions   Medication Sig     alendronate (FOSAMAX) 70 MG tablet TAKE 1 TABLET EVERY 7 DAYS TAKE IN THE MORNING ON AN EMPTY STOMACH WITH A FULL GLASS OF WATER 30 MINUTES BEFORE FOOD     aspirin 81 MG tablet Take 81 mg by mouth daily.     atorvastatin (LIPITOR) 40 MG tablet Take 40 mg by mouth bedtime.     atorvastatin (LIPITOR) 40 MG tablet TAKE 1 TABLET AT BEDTIME     b complex vitamins tablet Take 1 tablet by mouth daily.     calcium carbonate (OS-PATTI) 600 mg (1,500 mg) tablet Take 1,200 mg by mouth bedtime. Plus magnesium      cholecalciferol, vitamin D3, (VITAMIN D3) 2,000 unit cap Take 2,000 Units by mouth bedtime.      doxycycline (VIBRA-TABS) 100 MG tablet Take 2 tablets today for tick bite     irbesartan-hydrochlorothiazide (AVALIDE) 150-12.5 mg per tablet Take 1 tablet by mouth.     lysine 500 mg Tab Take 500 mg by mouth 2 (two) times a day.     multivitamin (MULTIVITAMIN) per tablet Take 1 tablet by mouth daily.     OMEGA-3/DHA/EPA/FISH OIL (FISH OIL-OMEGA-3 FATTY ACIDS) 300-1,000 mg capsule Take 500 mg by mouth 2 (two) times a day.        Review of Systems:  A comprehensive review of systems is negative except for the comments above    Objective:    There were no vitals taken for this visit.  GENERAL: No acute distress.  She has the tick that she removed and indeed it is a small deer tick.  The hand looks okay without any signs of rash etc.  There is no abscess.    Assessment & Plan   Sammi Guadalupe is a 66 y.o. female.    High risk deer tick bite.  She should take doxycycline 200 mg today.  I also gave her an extra 2  tablets to take in the future should she get another tick on her this summer.  She had Lyme's disease one year ago.  She needs to be very careful about avoiding ticks.    Diagnoses and all orders for this visit:    Tick bite, initial encounter  -     doxycycline (VIBRA-TABS) 100 MG tablet; Take 2 tablets today for tick bite  Dispense: 4 tablet; Refill: 0        Earl Altman MD  Transcription using voice recognition software, may contain typographical errors.

## 2021-06-12 NOTE — TELEPHONE ENCOUNTER
"Patient calls in today with concerns about a \"weird itching sensation in my chest wall.\"  She states that it does not feel skin related at all.  It feels like it is in the tissue or muscle.  She has had it for a couple of days but the last 24 hours it has gotten worse.  She has not used anything OTC for this or tried anything at all as she wanted to make sure what Dr Sanchez's recommendations were.  He asks that she try oral benadryl for at least 24 hours.  If this does not seem to help, she should call us back for further guidance.  She verbalized understanding.    Sheree Schroeder RN   "

## 2021-06-12 NOTE — PROGRESS NOTES
Assessment and Plan:   69-year-old woman for annual wellness visit and examination.  Patient has been advised of split billing requirements and indicates understanding: Yes  1. Routine general medical examination at a health care facility  Very good examination almost normal today.    2. Controlled diabetes mellitus type 1 with complications (H)  Most recent A1c was under 6.  - Glycosylated Hemoglobin A1c  - Microalbumin, Random Urine    3. Hypertension  Today's blood pressure 124/76  - Urinalysis-UC if Indicated    4. Insulin pump in place  She has had this pump in place for many years and feels quite well.    5. Malignant neoplasm of lower-outer quadrant of both breasts in female, estrogen receptor positive (H)  No evidence of any recurrence.  Currently on tamoxifen.    6. Age-related osteoporosis without current pathological fracture  DEXA is only for the near future.    7. Medication management  No trouble with current medications.  The aromatase inhibitors did cause some aching and some mood changes.  - HM1(CBC and Differential)  - Comprehensive Metabolic Panel    8. Need for vaccination  Flu shot and Pneumovax today  - Influenza,Quad,High Dose,PF 65 YR+    9. ASHD (arteriosclerotic heart disease)  Stent 8 years ago.  No chest pains.  She is active.  - Lipid Cascade        The patient's current medical problems were reviewed.    I have had an Advance Directives discussion with the patient.  The following health maintenance schedule was reviewed with the patient and provided in printed form in the after visit summary:   Health Maintenance   Topic Date Due     HEPATITIS B VACCINES (1 of 3 - Risk 3-dose series) 11/21/1971     ZOSTER VACCINES (2 of 2) 12/26/2011     DIABETIC FOOT EXAM  05/25/2018     DIABETIC EYE EXAM  05/25/2018     Pneumococcal Vaccine: Pediatrics (0 to 5 Years) and At-Risk Patients (6 to 64 Years) (3 of 3 - PPSV23) 11/19/2018     Pneumococcal Vaccine: 65+ Years (2 of 2 - PPSV23) 09/24/2019      MICROALBUMIN  09/24/2019     TD 18+ HE  10/22/2019     A1C  06/03/2020     DXA SCAN  06/28/2020     INFLUENZA VACCINE RULE BASED (1) 08/01/2020     LIPID  01/23/2021     BMP  07/23/2021     MEDICARE ANNUAL WELLNESS VISIT  10/19/2021     FALL RISK ASSESSMENT  10/19/2021     ADVANCE CARE PLANNING  10/14/2024     COLORECTAL CANCER SCREENING  04/20/2025     HEPATITIS C SCREENING  Completed        Subjective:   Chief Complaint: Sammi Guadalupe is an 69 y.o. female here for an Annual Wellness visit.   HPI: Comprehensive system review was done on this woman.  Basically all system review is negative.  No diabetic retinopathy or neuropathy.  No angina.  No claudication.  No congestive heart failure symptoms.  No dysphagia or heartburn.  Bladder and bowel function seems normal for her.  No significant musculoskeletal issues.      Review of Systems:    Please see above.  The rest of the review of systems are negative for all systems.    Patient Care Team:  Earl Altman MD as PCP - General (Internal Medicine)  Gagan Sanchez MD as Physician (Hematology and Oncology)  Andree Wray MD as Physician (General Surgery)  Yesenia Collier, RN as Registered Nurse (Hematology and Oncology)  Earl Altman MD as Assigned PCP  Omar Layne MD (Internal Medicine)     Patient Active Problem List   Diagnosis     Hypertension     Coronary Artery Disease     Malignant neoplasm of lower-outer quadrant of female breast (H)     Breast cancer, right breast (H)     Swelling of arm     DVT (deep venous thrombosis) (H)     Mixed hyperlipidemia     Age-related osteoporosis without current pathological fracture     Diabetes mellitus type I, controlled (H)     S/P coronary artery stent placement     Adverse effect of other drugs, medicaments and biological substances, sequela     Insulin pump in place     Past Medical History:   Diagnosis Date     ASHD (arteriosclerotic heart disease) 2012    CT scan, calcium score 428  lad artery deployment of a non-drug-eluting stent in the LAD     DVT (deep venous thrombosis) (H)      Family history of myocardial infarction       Past Surgical History:   Procedure Laterality Date     BACK SURGERY       CARPAL TUNNEL RELEASE Bilateral      LAMINECTOMY AND MICRODISCECTOMY CERVICAL SPINE N/A      LAPAROSCOPIC HYSTERECTOMY N/A 12/12/2016    Procedure: ROBOTIC TOTAL LAPAROSCOPIC HYSTERECTOMY, BILATERAL SALPINGO-OOPHORECTOMY, CYSTOSCOPY;  Surgeon: Sohan Ramirez MD;  Location: [facility] - [place];  Service:      MASTECTOMY Bilateral       Family History   Problem Relation Age of Onset     Cancer Mother      Heart attack Mother      Cancer Father      Heart disease Father      CABG Father      Heart disease Sister       Social History     Socioeconomic History     Marital status: Single     Spouse name: Not on file     Number of children: Not on file     Years of education: Not on file     Highest education level: Not on file   Occupational History     Not on file   Social Needs     Financial resource strain: Not on file     Food insecurity     Worry: Not on file     Inability: Not on file     Transportation needs     Medical: Not on file     Non-medical: Not on file   Tobacco Use     Smoking status: Former Smoker     Packs/day: 0.50     Years: 10.00     Pack years: 5.00     Smokeless tobacco: Never Used   Substance and Sexual Activity     Alcohol use: Yes     Alcohol/week: 5.0 standard drinks     Types: 3 Glasses of wine, 2 Cans of beer per week     Drug use: No     Sexual activity: Never   Lifestyle     Physical activity     Days per week: Not on file     Minutes per session: Not on file     Stress: Not on file   Relationships     Social connections     Talks on phone: Not on file     Gets together: Not on file     Attends Yazdanism service: Not on file     Active member of club or organization: Not on file     Attends meetings of clubs or organizations: Not on file     Relationship status: Not on  "file     Intimate partner violence     Fear of current or ex partner: Not on file     Emotionally abused: Not on file     Physically abused: Not on file     Forced sexual activity: Not on file   Other Topics Concern     Not on file   Social History Narrative     Not on file      Current Outpatient Medications   Medication Sig Dispense Refill     atorvastatin (LIPITOR) 40 MG tablet        b complex vitamins tablet Take 1 tablet by mouth daily.       blood glucose test strips Dispense item covered by pt ins. Test 6 times a day       calcium carbonate (OS-PATTI) 600 mg (1,500 mg) tablet Take 1,200 mg by mouth bedtime. Plus magnesium        cholecalciferol, vitamin D3, (VITAMIN D3) 2,000 unit cap Take 2,000 Units by mouth bedtime.        insulin lispro (HUMALOG) 100 unit/mL injection 20 Units daily.       irbesartan-hydrochlorothiazide (AVALIDE) 150-12.5 mg per tablet TAKE 1 TABLET BY MOUTH DAILY 90 tablet 2     lysine 500 mg Tab Take 500 mg by mouth 2 (two) times a day.       multivitamin therapeutic tablet Take 1 tablet by mouth daily.       tamoxifen (NOLVADEX) 20 MG tablet Take 1 tablet (20 mg total) by mouth daily. 90 tablet 3     aspirin 81 MG tablet Take 81 mg by mouth daily.       No current facility-administered medications for this visit.       Objective:   Vital Signs:   Visit Vitals  Ht 5' 3\" (1.6 m)   Wt 101 lb (45.8 kg)   BMI 17.89 kg/m           VisionScreening:  No exam data present     PHYSICAL EXAM  Blood pressure 1/24/1976.  Pulse 84.  EYES: Eyelids, conjunctiva, and sclera were normal. Pupils were normal. Cornea, iris, and lens were normal bilaterally.  HEAD, EARS, NOSE, MOUTH, AND THROAT: Head and face were normal. Hearing was normal to voice and the ears were normal to external exam. Nose appearance was normal and there was no discharge. Oropharynx was normal.  NECK: Neck appearance was normal. There were no neck masses and the thyroid was not enlarged.  No bruits.  RESPIRATORY: Breathing pattern was " normal and the chest moved symmetrically.  Percussion/auscultatory percussion was normal.  Lung sounds were normal and there were no abnormal sounds.  CARDIOVASCULAR: Heart rate and rhythm were normal.  S1 and S2 were normal and there were no extra sounds or murmurs. Peripheral pulses in arms and legs were normal.  Jugular venous pressure was normal.  There was no peripheral edema.  GASTROINTESTINAL: The abdomen was normal in contour.  Bowel sounds were present.  Percussion detected no organ enlargement or tenderness.  Palpation detected no tenderness, mass, or enlarged organs.  Insulin pump on the right side of the abdomen.  MUSCULOSKELETAL: Skeletal configuration was normal and muscle mass was normal for age. Joint appearance was overall normal.  LYMPHATIC: There were no enlarged nodes.  SKIN/HAIR/NAILS: Skin color was normal.  There were no skin lesions.  Hair and nails were normal.  NEUROLOGIC: The patient was alert and oriented to person, place, time, and circumstance. Speech was normal. Cranial nerves were normal. Motor strength was normal for age. The patient was normally coordinated.  Monofilament fiber sensation normal in the hands and feet  PSYCHIATRIC:  Mood and affect were normal and the patient had normal recent and remote memory. The patient's judgment and insight were normal.    ADDITIONAL VITAL SIGNS: Oxygen saturation 96%  CHEST WALL/BREASTS:bi Lateral mastectomies in the past  RECTAL: Not checked  GENITAL/URINARY: Exam deferred.      Assessment Results 10/19/2020   Activities of Daily Living No help needed   Instrumental Activities of Daily Living No help needed   Get Up and Go Score Less than 12 seconds   Mini Cog Total Score 5   Some recent data might be hidden     A Mini-Cog score of 0-2 suggests the possibility of dementia, score of 3-5 suggests no dementia  No signs of any cognitive dysfunction.  No falls or injuries this year.    Identified Health Risks:     Patient's advanced directive was  discussed and I am comfortable with the patient's wishes.

## 2021-06-12 NOTE — PROGRESS NOTES
Admission History & Physical  Sammi Guadalupe, 1950, [id number]    [facility]  Earl Altman MD, [phone]    Extended Emergency Contact Information  Primary Emergency Contact: Rolf Rubio  Address: [address] [address] [place] [address] [place]  Mobile Phone: [phone]  Relation: Spouse     Assessment and Plan:   Assessment: Intractable plantar keratoma right foot  Plan: Debrided hyperkeratotic lesion right foot  Active Problems:    * No active hospital problems. *      Chief Complaint:  Painful callus right foot     HPI:    Sammi Guadalupe is a 66 y.o. old female who presented to the clinic today complaining of a callus on the bottom of her right foot.  It is slightly painful.  Weightbearing and ambulation can aggravate this area of her foot.  The pain is relieved with nonweightbearing.  She has not had any redness, swelling, drainage or bleeding.  She denies any particular trauma to her right foot.  She denies any previous treatment.  History is provided by patient    Medical History  Active Ambulatory (Non-Hospital) Problems    Diagnosis     Mixed hyperlipidemia     Diabetes mellitus type 1     Swelling of arm     DVT (deep venous thrombosis)     Breast cancer, right breast     Malignant neoplasm of lower-outer quadrant of female breast     Hypertension     Coronary Artery Disease     Past Medical History:   Diagnosis Date     ASHD (arteriosclerotic heart disease) 2012     Breast cancer      Coronary artery disease      Diabetes mellitus      DVT (deep venous thrombosis)      Hyperlipidemia      Hypertension      Patient Active Problem List    Diagnosis Date Noted     Mixed hyperlipidemia 10/10/2016     Diabetes mellitus type 1 12/01/2015     Swelling of arm 11/17/2014     DVT (deep venous thrombosis) 11/17/2014     Breast cancer, right breast 06/03/2014     Malignant neoplasm of lower-outer quadrant of female breast 05/20/2014     Hypertension       Coronary Artery Disease      Surgical History  She  has a past surgical history that includes Back surgery; Carpal tunnel release (Bilateral); Laminectomy and microdiscectomy cervical spine (N/A); Mastectomy (Bilateral); and Laparoscopic hysterectomy (N/A, 12/12/2016).   Past Surgical History:   Procedure Laterality Date     BACK SURGERY       CARPAL TUNNEL RELEASE Bilateral      LAMINECTOMY AND MICRODISCECTOMY CERVICAL SPINE N/A      LAPAROSCOPIC HYSTERECTOMY N/A 12/12/2016    Procedure: ROBOTIC TOTAL LAPAROSCOPIC HYSTERECTOMY, BILATERAL SALPINGO-OOPHORECTOMY, CYSTOSCOPY;  Surgeon: Sohan Ramirez MD;  Location: [facility];  Service:      MASTECTOMY Bilateral     Social History  Reviewed, and she  reports that she has quit smoking. She has a 5.00 pack-year smoking history. She has never used smokeless tobacco. She reports that she drinks about 3.0 oz of alcohol per week  She reports that she does not use illicit drugs.  Social History   Substance Use Topics     Smoking status: Former Smoker     Packs/day: 0.50     Years: 10.00     Smokeless tobacco: Never Used     Alcohol use 3.0 oz/week     3 Glasses of wine, 2 Cans of beer per week      Allergies  Allergies   Allergen Reactions     Nitroglycerin Other (See Comments)     Blood Pressure drops significantly.     Sulfa (Sulfonamide Antibiotics) Dizziness     Dizziness and doesn't feel normal.    Family History  Reviewed, and family history includes Cancer in her father and mother.   Psychosocial Needs  Social History     Social History Narrative     Additional psychosocial needs reviewed per nursing assessment.       Prior to Admission Medications     (Not in a hospital admission)        Review of Systems - Negative    /64  Pulse 66  Temp 98.3  F (36.8  C) (Temporal)   SpO2 99%    Objective findings: Gen.: The patient is alert and in no acute distress      Integument: Nails bilateral feet are normal length and color. Skin bilaterally warm and  intact.  There is a small round hyperkeratotic nucleated lesion sub-fifth metatarsal head of the right foot.  There is no bleeding noted surrounding this lesion.      Vascular: DP and PT pulses +2 over 4 bilateral feet. Capillary refill less than 2 seconds bilateral feet.      Neurologic: Negative clonus, negative Babinski bilateral feet.      Musculoskeletal: Range of motion within normal limits. Muscle power +5 over 5 within all compartments.     Assessment:   Intractable plantar keratoma right foot , diabetes mellitus      Plan:   I debrided the hyperkeratotic lesion today.  I recommended the patient return to the clinic as needed for continued foot care.  Have recommended orthotics with a metatarsal raise.

## 2021-06-12 NOTE — PROGRESS NOTES
Office Visit - Follow Up   Sammi Guadalupe   66 y.o. female    Date of Visit: 8/2/2017    Chief Complaint   Patient presents with     warts on the bottom of her feet     Muscle Pain        Assessment and Plan   1. Callus of foot  Possibly associated with a wart, discussed with her that these should be evaluated by podiatry especially given her diabetes  - Ambulatory referral to Podiatry    2. Bilateral arm pain  I think she has some rotator cuff tendinopathy and I recommended physical therapy and discussed Codman maneuvers.  She would like to see orthopedic surgeon and this will be arranged.  - Ambulatory referral to Orthopedic Surgery    3. 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     History of Present Illness   This 66 y.o. old woman comes in for evaluation of numerous medical problems.  First she has noticed some callus and possible warts on the bottom of her feet she would like me to look at.  They seem to be aggravated by her shoes.  Is now hurting to walk especially on the right foot.  Additionally she has been traveling a lot and has noticed some shoulder pain and arm pain.  This initially was in the right arm and that C arm she carries her suitcase.  She switched the suitcase to the left arm and now is having both left and right arm pain.  She does weights in an aerobics class and has noticed difficult to be doing overhead weight lifting.  She also has some aching into her forearms.  This is not exertional.  She has no shortness of breath no chest pain.  She does have some soreness in her hips as well but has underlying neuropathy from chemotherapeutic medications and believes this is the cause.  She does not really have any morning stiffness per    Review of Systems: A comprehensive review of  "systems was negative except as noted.     Medications, Allergies and Problem List   Reviewed and updated     Physical Exam   General Appearance:   No acute Distor    /64 (Patient Site: Right Arm, Patient Position: Sitting, Cuff Size: Adult Regular)  Pulse 66  Ht 5' 3\" (1.6 m)  Wt 105 lb (47.6 kg)  SpO2 99%  BMI 18.6 kg/m2    HEENT exam is unremarkable neck supple cardiovascular regular rate and rhythm no murmur gallop or rub lungs clear to auscultation bilaterally she does have pain with palpation over the deltoid muscles and pain with overhead range of motion.  She is no obvious deformity no impingement.  She has some pain with palpation along the medial epicondyles especially of the right arm.  Normal strength sensation reflexes in the upper extremities.  She does have some calluses on her feet level of the fifth metatarsal no obvious wart     Additional Information   Current Outpatient Prescriptions   Medication Sig Dispense Refill     alendronate (FOSAMAX) 70 MG tablet TAKE 1 TABLET EVERY 7 DAYS TAKE IN THE MORNING ON AN EMPTY STOMACH WITH A FULL GLASS OF WATER 30 MINUTES BEFORE FOOD 12 tablet 2     anastrozole (ARIMIDEX) 1 mg tablet        aspirin 81 MG tablet Take 81 mg by mouth daily.       atorvastatin (LIPITOR) 40 MG tablet Take 40 mg by mouth bedtime.       b complex vitamins tablet Take 1 tablet by mouth daily.       blood glucose test strips Dispense item covered by pt ins. Test 6 times a day       calcium carbonate (OS-PATTI) 600 mg (1,500 mg) tablet Take 1,200 mg by mouth bedtime. Plus magnesium        cholecalciferol, vitamin D3, (VITAMIN D3) 2,000 unit cap Take 2,000 Units by mouth bedtime.        insulin asp prt-insulin aspart (NOVOLOG MIX 70-30) 100 unit/mL (70-30) Soln Inject under the skin 2 (two) times a day with meals.       irbesartan-hydrochlorothiazide (AVALIDE) 150-12.5 mg per tablet Take 1 tablet by mouth.       lysine 500 mg Tab Take 500 mg by mouth 2 (two) times a day.       " multivitamin (MULTIVITAMIN) per tablet Take 1 tablet by mouth daily.       OMEGA-3/DHA/EPA/FISH OIL (FISH OIL-OMEGA-3 FATTY ACIDS) 300-1,000 mg capsule Take 500 mg by mouth 2 (two) times a day.        No current facility-administered medications for this visit.      Allergies   Allergen Reactions     Nitroglycerin Other (See Comments)     Blood Pressure drops significantly.     Sulfa (Sulfonamide Antibiotics) Dizziness     Dizziness and doesn't feel normal.     Social History   Substance Use Topics     Smoking status: Former Smoker     Packs/day: 0.50     Years: 10.00     Smokeless tobacco: Never Used     Alcohol use 3.0 oz/week     3 Glasses of wine, 2 Cans of beer per week       Review and/or order of clinical lab tests:  Review and/or order of radiology tests:  Review and/or order of medicine tests:  Discussion of test results with performing physician:  Decision to obtain old records and/or obtain history from someone other than the patient:  Review and summarization of old records and/or obtaining history from someone other than the patient and.or discussion of case with another health care provider:  Independent visualization of image, tracing or specimen itself:    Time:      Rolf Helm MD

## 2021-06-13 NOTE — PROGRESS NOTES
Office Visit - Physical   Sammi Guadalupe   66 y.o.  female for annual physical examination.  She has been feeling well.    Date of visit: 9/27/2017  Physician: Earl Altman MD     Assessment and Plan   1. Routine general medical examination at a health care facility  She continues to work full-time.  She retired in 6 months.  She is active.    2. Type 2 diabetes mellitus without complication  Diabetes now since 2005.  She has an insulin pump.    3. Mixed hyperlipidemia  No cardiovascular symptoms at this time.  She does take atorvastatin 40 mg.    4. Breast cancer, right breast  He had bilateral simple mastectomies.  She currently takes arimidex daily.  She follows oncology.    5. Atherosclerosis of native coronary artery of native heart without angina pectoris  No chest pain with exertion.  She can do rigorous exercise.    6. Essential hypertension with goal blood pressure less than 130/85  Today's blood pressure 118/66    7. Medication management  No obvious trouble with medications.  She wants to try a different medicine other than Fosamax for her osteopenia.    8. Need for vaccination  Influenza due today.      The following low BMI interventions were performed this visit: nutrition/feeding management    No Follow-up on file.     Chief Complaint   No chief complaint on file.       Patient Profile   Social History     Social History Narrative   Works full-time.  No children.     Past Medical History   Patient Active Problem List   Diagnosis     Hypertension     Coronary Artery Disease     Malignant neoplasm of lower-outer quadrant of female breast     Breast cancer, right breast     Swelling of arm     DVT (deep venous thrombosis)     Diabetes mellitus type 1     Mixed hyperlipidemia       Past Surgical History  She has a past surgical history that includes Back surgery; Carpal tunnel release (Bilateral); Laminectomy and microdiscectomy cervical spine (N/A); Mastectomy (Bilateral); and Laparoscopic  hysterectomy (N/A, 12/12/2016).     History of Present Illness   This 66 y.o. old female sent for annual exam.  She is diabetic now for 12 years with an insulin pump.  She is quite active.  No hypoglycemia.  She has had no problems with ischemic heart disease symptoms.  No infections.  No orthopnea.  No claudication  No chronic cough or wheezing.  She is a non-smoker  No dysphasia.  No bowel problems.  Last colonoscopy was 2015 it was normal.  Hysterectomy last year.  No problems with urinary incontinence.  Hematuria no history of kidney stones per  No significant joint problems.  Minor numbness of her fingertips after chemotherapy for breast cancer.    Review of Systems: A comprehensive review of systems was negative except as noted.     Medications and Allergies   Current Outpatient Prescriptions   Medication Sig Dispense Refill     alendronate (FOSAMAX) 70 MG tablet TAKE 1 TABLET EVERY 7 DAYS TAKE IN THE MORNING ON AN EMPTY STOMACH WITH A FULL GLASS OF WATER 30 MINUTES BEFORE FOOD 12 tablet 2     anastrozole (ARIMIDEX) 1 mg tablet        aspirin 81 MG tablet Take 81 mg by mouth daily.       atorvastatin (LIPITOR) 40 MG tablet Take 40 mg by mouth bedtime.       b complex vitamins tablet Take 1 tablet by mouth daily.       blood glucose test strips Dispense item covered by pt ins. Test 6 times a day       calcium carbonate (OS-PATTI) 600 mg (1,500 mg) tablet Take 1,200 mg by mouth bedtime. Plus magnesium        cholecalciferol, vitamin D3, (VITAMIN D3) 2,000 unit cap Take 2,000 Units by mouth bedtime.        insulin asp prt-insulin aspart (NOVOLOG MIX 70-30) 100 unit/mL (70-30) Soln Inject under the skin 2 (two) times a day with meals.       irbesartan-hydrochlorothiazide (AVALIDE) 150-12.5 mg per tablet Take 1 tablet by mouth.       lysine 500 mg Tab Take 500 mg by mouth 2 (two) times a day.       multivitamin (MULTIVITAMIN) per tablet Take 1 tablet by mouth daily.       OMEGA-3/DHA/EPA/FISH OIL (FISH OIL-OMEGA-3  FATTY ACIDS) 300-1,000 mg capsule Take 500 mg by mouth 2 (two) times a day.        No current facility-administered medications for this visit.      Allergies   Allergen Reactions     Nitroglycerin Other (See Comments)     Blood Pressure drops significantly.     Sulfa (Sulfonamide Antibiotics) Dizziness     Dizziness and doesn't feel normal.        Family and Social History   Family History   Problem Relation Age of Onset     Cancer Mother      Cancer Father         Social History   Substance Use Topics     Smoking status: Former Smoker     Packs/day: 0.50     Years: 10.00     Smokeless tobacco: Never Used     Alcohol use 3.0 oz/week     3 Glasses of wine, 2 Cans of beer per week        Physical Exam   General Appearance:   Thin female no distress.  Blood pressure 118/66.  Pulse 66 and regular.    There were no vitals taken for this visit.    EYES: Eyelids, conjunctiva, and sclera were normal. Pupils were normal. Cornea, iris, and lens were normal bilaterally.  No diabetic changes per  HEAD, EARS, NOSE, MOUTH, AND THROAT: Head and face were normal. Hearing was normal to voice and the ears were normal to external exam. Nose appearance was normal and there was no discharge. Oropharynx was normal.  NECK: Neck appearance was normal. There were no neck masses and the thyroid was not enlarged.  RESPIRATORY: Breathing pattern was normal and the chest moved symmetrically.  Percussion/auscultatory percussion was normal.  Lung sounds were normal and there were no abnormal sounds.  CARDIOVASCULAR: Heart rate and rhythm were normal.  S1 and S2 were normal and there were no extra sounds or murmurs. Peripheral pulses in arms and legs were normal.  Jugular venous pressure was normal.  There was no peripheral edema.  GASTROINTESTINAL: The abdomen was normal in contour.  Bowel sounds were present.  Percussion detected no organ enlargement or tenderness.  Palpation detected no tenderness, mass, or enlarged organs.  Insulin pump on  abdomen.  MUSCULOSKELETAL: Skeletal configuration was normal and muscle mass was normal for age. Joint appearance was overall normal.  LYMPHATIC: There were no enlarged nodes.  SKIN/HAIR/NAILS: Skin color was normal.  There were no skin lesions.  Hair and nails were normal.  NEUROLOGIC: The patient was alert and oriented to person, place, time, and circumstance. Speech was normal. Cranial nerves were normal. Motor strength was normal for age. The patient was normally coordinated.  Monofilament fiber sensation is normal in the hands and feet.  PSYCHIATRIC:  Mood and affect were normal and the patient had normal recent and remote memory. The patient's judgment and insight were normal.    ADDITIONAL VITAL SIGNS: Oxygen saturation 98%.  CHEST WALL/BREASTS: Bilateral mastectomies in the past.  The breast cancer was on the right side.  There is no lymphedema.  RECTAL: Normal per gyn  GENITAL/URINARY: Gynecology     Additional Information        Earl Altman MD  Internal Medicine  Contact me at [phone]

## 2021-06-13 NOTE — PROGRESS NOTES
S)  Patient reports having pain on her Rt foot in the 5th methead area, plantar surface.  Patient believes the pain started back in July and denies any traumatic events.  When asked to rate the pain, she stated the pain to be a 3/10 when the callous is present.  Patient is a type 1 diabetic and has some numbness in the forefoot of her Rt foot.  Patient also stated having chemo which she believes contributes to her numbness.  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  Patient is very active.    O)  Callous formation present on the Rt foot, 5th methead plantar surface measuring 3x3 mm.  Callous formation on the Lt foot plantar lateral surface measuring 5x5 mm.  Current shoe size is a 7.5 narrow in women's.    A)  Obtained a semi-weight bearing foam box impression without incident.    P)  Scheduled patient for a fitting appointment in two weeks.  FO's will be a total contact full length design that will support the medial arch and will use metbar pads which acts to transfer force off of the metatarsal heads by increasing force under the metatarsal necks and shafts.  This should help to unload weight off the callous and reduce current pain levels.      Anthony Woo CPO.

## 2021-06-13 NOTE — TELEPHONE ENCOUNTER
----- Message -----  From: Yasmine Pierre RN  Sent: 11/10/2020  10:43 AM CST  To: Rolf Gipson MD    ----- Message from Yasmine Pierre, RN sent at 11/10/2020 10:43 AM CST -----  Dr. Gipson, patient scheduled for yearly visit with you next month. She recalls that you recommended to repeat NM Exercise stress test this year. No orders or recommendations for testing in place. Please review and advise. -sha    ----- Message -----  From: Rolf Gipson MD  Sent: 11/10/2020  11:34 AM CST  To: Yasmine Pierre, RN    Yes, stress nuke        Order placed. Patient updated. -[email or web address]

## 2021-06-13 NOTE — TELEPHONE ENCOUNTER
----- Message from Ludy Singh sent at 11/10/2020  9:12 AM CST -----      Caller: Patient  Primary cardiologist: Dr. Gipson    Detailed reason for call: Patient was wondering if she could do a Nuclear Stress Test and needs an order. She stated that she has been trying to get an order for one. Please advise    Best phone number: [phone]  Best time to contact: today  Ok to leave a detailed message? yes  Device? No

## 2021-06-13 NOTE — PROGRESS NOTES
Subjective findings: The patient return to the clinic today complaining of a reoccurring painful callus on the bottom of her right foot.  She stated that this area can be painful.  She is extremely active.  She exercises and hikes regularly.  She is planning a hiking trip within the next 2 months.  She wanted to get some relief prior to her trip.  She had debridement of this area during her last visit but she stated the painful callus returned within 1 month.    Objective findings:  Nails bilateral feet are normal length and color. Skin bilaterally warm and intact.  There is a small round hyperkeratotic nucleated lesion sub-fifth metatarsal head of the right foot.  There is no bleeding noted surrounding this lesion.   DP and PT pulses +2 over 4 bilateral feet. Capillary refill less than 2 seconds bilateral feet.   Negative clonus, negative Babinski bilateral feet.   Range of motion within normal limits. Muscle power +5 over 5 within all compartments.      Assessment: Plantarflexed fifth metatarsal right foot, iractable plantar keratoma right foot , diabetes mellitus      Plan:   I debrided the hyperkeratotic lesion today.  I recommended the patient return to the clinic as needed for continued foot care.  Have recommended orthotics with a metatarsal raise.

## 2021-06-13 NOTE — PROGRESS NOTES
S)  Patient was excited for fitting of FO's today.    O)  Patient arrived wearing hiking boots to appointment today.    A)  Fit and delivered custom foot orthotics.  Trimmed FO's to length and width of shoes provided.  Patient ambulated around office for about five minutes and stated FO's felt comfortable to her; skin check after ambulation was good.  Advised the patient to a wear schedule of four hours the first day, increasing wear time by one hour each day after the first.  Furthermore, advised the patient to preform skin checks herself, looking for dark red spots that if present and not dissipating after ten minutes to discontinue use and call me for an adjustment.  Provided patient with manufacture wear and care instructions.    P)  Provided patient with a business card should she have any future questions or comments.    Anthony Woo CPO.

## 2021-06-13 NOTE — TELEPHONE ENCOUNTER
Patient calls in today as a follow-up to her triage call yesterday.  She tells me that the oral Benadryl did work for her itching that felt like it was internal on the left chest wall.  She is calling back to see if Dr Sanchez thinks that this has anything to do with her tamoxifen and what is the areas of what caused this.  Per Dr Sanchez he does not think it is due to tamoxifen.  If it were to have anything to do with the tamoxifen he feels that it would be more widespread than localized to the left chest wall area.  He is not certain what could have caused this but he said there could be some nerve issue going on or she could have overused the muscle in that area.  Patient did tell me that she did do some raking a few days prior to that.  I let her know that she can take Benadryl as needed if this happens in the future but also try to pinpoint down what might be causing it.  She verbalized understanding and was appreciative.    Sheree Schroeder RN

## 2021-06-14 NOTE — PROGRESS NOTES
Sammi came to chemo infusion following lab and MD visit for her next dose of Prolia.  Prolia education was reviewed.  Sammi received Prolia as ordered subcutaneous and tolerated it well.  Site was covered with a bandaid.  Sammi left clinic ambulatory.

## 2021-06-14 NOTE — PROGRESS NOTES
[facility] Hematology and Oncology Progress Note    Patient: Sammi Guadalupe  MRN: [id number]  Date of Service:         Reason for Visit    Chief Complaint   Patient presents with     HE Cancer       Assessment and Plan    T1 N0 M0, stage I right breast cancer, HER-2 positive by RT-PCR and fish, Oncotype score of 30, tumor ER/CT positive, diagnosis October 2013  Myalgias  Osteopenia    Patient tolerating tamoxifen well.  No evidence of recurrence of breast cancer.  We will do follow-up again in 6 months.  Can go to annual visits after that.  She can stop tamoxifen at the end of May this year.    Bone density shows stable osteopenia.  She will continue with Prolia today and again in 6 months.    Plan: As above    Measurable disease: None postoperatively        Current therapy: Tamoxifen started in July 20 1920 mg daily     TREATMENT HISTORY:     Letrozole 2.5 mg daily between July 2019 and July 2020       Anastrozole 1 mg by mouth daily started in April 2016 and stopped in July 2019    Tamoxifen 20 mg daily since May 2014      1 year of Herceptin completed in February 2015    Taxotere carboplatin and Herceptin for 5 cycles completed in April 2014    One cycle of Taxotere and cyclophosphamide started January 30, 2014    Bilateral mastectomy in October 2013       Malignant neoplasm of lower-outer quadrant of female breast    Staging form: Breast, AJCC 7th Edition      Clinical: Stage IA (T1, N0, cM0) - Signed by Gagan Sanchez MD on 6/3/2014    ECOG Performance   ECOG Performance Status: 0    Distress Assessment  Distress Assessment Score: No distress    Pain           Problem List    1. Malignant neoplasm of lower-outer quadrant of both breasts in female, estrogen receptor positive (H)          CC: Jeana Al MD    ______________________________________________________________________________    History of Present Illness    Ms. Sammi Guadalupe is here for reevaluation.  She was seen 6 months  ago.  Switched to tamoxifen last July with significant improvement in symptoms including fatigue.  Had episode of itching on the central anterior chest wall which resolved with 1 dose of Benadryl.  No headaches.  No shortness of breath or cough.  No new bone or abdominal pain.  Coping with the pandemic.    Has had follow-ups with primary care and cardiology recently.  Also sees endocrinology.  ECOG status is 0.    Pain Status  Currently in Pain: No/denies    Review of Systems    Constitutional  Constitutional (WDL): All constitutional elements are within defined limits  Neurosensory  Neurosensory (WDL): All neurosensory elements are within defined limits  Cardiovascular  Cardiovascular (WDL): All cardiovascular elements are within defined limits  Pulmonary  Respiratory (WDL): Within Defined Limits  Gastrointestinal  Gastrointestinal (WDL): All gastrointestinal elements are within defined limits  Genitourinary  Genitourinary (WDL): All genitourinary elements are within defined limits  Integumentary  Integumentary (WDL): All integumentary elements are within defined limits  Patient Coping     Distress Assessment  Distress Assessment Score: No distress  Accompanied by  Accompanied by: Alone    Past History  Past Medical History:   Diagnosis Date     ASHD (arteriosclerotic heart disease) 2012    CT scan, calcium score 428 lad artery deployment of a non-drug-eluting stent in the LAD     DVT (deep venous thrombosis) (H)      Family history of myocardial infarction          Past Surgical History:   Procedure Laterality Date     BACK SURGERY       CARPAL TUNNEL RELEASE Bilateral      LAMINECTOMY AND MICRODISCECTOMY CERVICAL SPINE N/A      LAPAROSCOPIC HYSTERECTOMY N/A 12/12/2016    Procedure: ROBOTIC TOTAL LAPAROSCOPIC HYSTERECTOMY, BILATERAL SALPINGO-OOPHORECTOMY, CYSTOSCOPY;  Surgeon: Sohan Ramirez MD;  [place] [facility] [place]  Service:      MASTECTOMY Bilateral        Physical Exam    Recent Vitals 1/21/2021    Height -   Weight 102 lbs 10 oz   BSA (m2) 1.44 m2   /69   Pulse 64   Temp 97.4   Temp src 1   SpO2 99   Some recent data might be hidden       GENERAL: Alert and oriented. Seated comfortably. In no distress.    HEAD: Atraumatic and normocephalic.  Has a full head of hair.    EYES: MINH, EOMI.  No pallor.  No icterus.    Oral cavity: no mucosal lesion or tonsillar enlargement.    NECK: supple. JVP normal.  No thyroid enlargement.    LYMPH NODES: No palpable, cervical, axillary or inguinal lymphadenopathy.    CHEST: clear to auscultation bilaterally.  Resonant to percussion throughout bilaterally.  Symmetrical breath movements bilaterally.    CVS: S1 and S2 are heard. Regular rate and rhythm.  No murmur or gallop or rub heard.    Breasts: She is status post bilateral mastectomies.  No locally recurrent disease.  No axillary adenopathy.    ABDOMEN: Soft. Not tender. Not distended.  No palpable hepatomegaly or splenomegaly.  No other mass palpable.  Bowel sounds heard.    EXTREMITIES: Warm.  No peripheral edema.  No tenderness to palpation over the upper arms    SKIN: no rash, or bruising or purpura.        Lab Results    Recent Results (from the past 168 hour(s))   Comprehensive Metabolic Panel   Result Value Ref Range    Sodium 139 136 - 145 mmol/L    Potassium 3.2 (L) 3.5 - 5.0 mmol/L    Chloride 100 98 - 107 mmol/L    CO2 30 22 - 31 mmol/L    Anion Gap, Calculation 9 5 - 18 mmol/L    Glucose 81 70 - 125 mg/dL    BUN 13 8 - 22 mg/dL    Creatinine 0.72 0.60 - 1.10 mg/dL    GFR MDRD Af Amer >60 >60 mL/min/1.73m2    GFR MDRD Non Af Amer >60 >60 mL/min/1.73m2    Bilirubin, Total 0.7 0.0 - 1.0 mg/dL    Calcium 8.9 8.5 - 10.5 mg/dL    Protein, Total 7.0 6.0 - 8.0 g/dL    Albumin 4.3 3.5 - 5.0 g/dL    Alkaline Phosphatase 52 45 - 120 U/L    AST 34 0 - 40 U/L    ALT 22 0 - 45 U/L       Imaging    Dxa Bone Density Scan    Result Date: 1/16/2021 1/7/2021 RE: Sammi Guadalupe YOB: 1950 Dear Daniel,  Kaitykristin Hugheskaran, Patient Profile: 70 y.o. female, postmenopausal, is here for the follow up bone density test. History of fractures - None. Family history of osteoporosis - Yes;  mother.  Family history of hip fracture: None. Smoking history - Past. Osteoporosis treatment past -  Yes;  Bisphosphonates. Osteoporosis treatment current - Yes;  Prolia.  Chronic medical problems - Breast cancer, Chronic low back problems, Diabetes Mellitus and Spine surgery. High risk medications -  Blood thinner (Coumadin or Heparin);  Yes, in the Past, Chemotherapy;  Yes, in the Past, Steroids;  Yes, in the Past and Aromatase Inhibitor;  Yes, in the Past. Assessment: 1. The spine bone density L1-L2 with T-score 0.3, with no statistically significant change compared to the previous DXA scan done in 2018. 2. Femoral bone densities show left femoral neck T- score -1.3 and right femoral neck T-score -1.2, with no statistically significant change compared to the previous DXA scan done in 2018. 3. Trabecular bone score indicates good trabecular bone architecture. 70 y.o. female with LOW BONE DENSITY (OSTEOPENIA) and LOW fracture risk, adjusted for the TBS, with major osteoporotic fracture risk 6.6% and hip fracture risk 0.8%. Recommendations: Appropriate calcium, vitamin D supplements, along with balance and weight bearing exercise recommended with follow up bone density scan in 2 years. Bone densitometry was performed on your patient using our Optimitive densitometer. The results are summarized and a copy of the actual scans are included for your review. In conformity with the International Society of Clinical Densitometry's most recent position statement for DXA interpretation (2015), the diagnosis will be made on the lowest measured T-score of the lumbar spine, femoral neck, total proximal femur or 33% radius. Note the change in terminology for diagnostic classification from OSTEOPENIA to LOW BONE MASS. All trending for sequential  exams will be done using multiple vertebrae or the total proximal femur. Fracture risk is based on the WHO Fracture Risk Assessment Tool (FRAX). If additional information is needed or if you would like to discuss the results, please do not hesitate to call me. Thank you for referring this patient to [facility]opor[facility]. We are happy to be of service in support of you and your practice. If you have any questions or suggestions to improve our service, please call me at [phone]. Sincerely, Shannan Hinojosa M.D. C.C.D. Osteoporosis Services, [facility]         Signed by: Gagan Sanchez MD

## 2021-06-14 NOTE — PROGRESS NOTES
Office Visit - establish care    Sammi Guadalupe   70 y.o.  female    Date of visit: 1/3/2021  Physician: Jeana Al MD     Assessment and Plan   1. Type 1 diabetes mellitus with complication (H)  Latent onset . Happened when she was in her 50's but had to go to insulin she has had no complications .  Lab Results   Component Value Date    HGBA1C 5.9 (H) 10/19/2020     Extremely tight control on insulin pump managed by endcrinology   2. S/P coronary artery stent placement    On statin . And ARB . Aspirin followed closely by cardiology had recent echo and stress test this year . Excellent lifestyle and asymptomatic   3. Malignant neoplasm of lower-outer quadrant of female breast, unspecified estrogen receptor status, unspecified laterality (H)  Completing tamoxifen . Was on armidex before . Followed by oncology s/p bilateral mastectomy . Sp/TAHBSO     4. Age-related osteoporosis without current pathological fracture  Getting regular dexa scans done by oncology     Tetanus , and shingrix are due .  tetanus toxoid can be done now its every ten years and you are past due    shingrix can be deferred because you already had the old shingles vaccine    . They prefer not the covid vaccine contiguously with other vaccines because they want to monitor side effects for data collection     Labs are due in march    eye exam should be done by early next year         Time:  Return in about 10 months (around 10/30/2021) for Annual physical.       Patient Profile   Social History     Social History Narrative     Not on file        Past Medical History   Past Medical History:   Diagnosis Date     ASHD (arteriosclerotic heart disease) 2012    CT scan, calcium score 428 lad artery deployment of a non-drug-eluting stent in the LAD     DVT (deep venous thrombosis) (H)      Family history of myocardial infarction        Patient Active Problem List    Diagnosis Date Noted     Type 1 diabetes mellitus with complication (H)  03/14/2020     Overview Note:     Since 2007 ( late onset )    initially put on metformin . And then out you on insulin        Insulin pump in place 03/27/2019     Overview Note:     .Basal rates: units/hr  MN  0.4  (change to .35)  6am 0.2    11am 0.1   6pm 0.225  8pm to MN 0.375     Bolus:  Insulin to Carb ratio  at Midnite  1 unit(s) per 15 grams CHO  (change to 22)                                     at 11am      1  unit(s) per 15 grams CHO    (change to 22)                                             at  5pm to MN    1      unit(s) per 12 grams CHO   (change  to 22)  Sensitivity  67  glucose target range  100-100  Active Insulin Time  4       Adverse effect of other drugs, medicaments and biological substances, sequela 11/26/2018     Diabetes mellitus type I, controlled (H) 09/24/2018     S/P coronary artery stent placement 09/24/2018     Overview Note:     2012 single stent . Dr Gipson . Presented angina       Age-related osteoporosis without current pathological fracture 07/17/2018     Overview Note:     Was on fosamax for 5 years , now on prolia        Mixed hyperlipidemia 10/10/2016     Swelling of arm 11/17/2014     DVT (deep venous thrombosis) (H) 11/17/2014     Breast cancer, right breast (H) 06/03/2014     Malignant neoplasm of lower-outer quadrant of female breast (H) 05/20/2014     Overview Note:     S/p bilateral mastectomy .    then had chemotherapy , is still on tamoxifen going t stop tamoxifen this year .        Hypertension      Overview Note:     Created by Conversion    Replacement Utility updated for latest IMO load       Coronary Artery Disease      Overview Note:     Created by Conversion    Replacement Utility updated for latest IMO load             Past Surgical History  She has a past surgical history that includes Back surgery; Carpal tunnel release (Bilateral); Laminectomy and microdiscectomy cervical spine (N/A); Mastectomy (Bilateral); and Laparoscopic hysterectomy (N/A, 12/12/2016).      History of Present Illness   This 70 y.o. old very pleasant pt of Dr jefferson here to establish care . She has type 1 diabetes, IHD and is a breast cancer survivor . She has excellent  Insight to her chronic disease     Review of Systems: A comprehensive review of systems was negative except as noted.     Medications and Allergies   Current Outpatient Medications   Medication Sig Dispense Refill     aspirin 81 MG tablet Take 81 mg by mouth daily.       atorvastatin (LIPITOR) 40 MG tablet Take 1 tablet (40 mg total) by mouth at bedtime. 90 tablet 3     b complex vitamins tablet Take 1 tablet by mouth daily.       blood glucose test strips Dispense item covered by pt ins. Test 6 times a day       blood-glucose sensor (DEXCOM G6 SENSOR) Lida USE AS DIRECTED FOR CONTINUOUS GLUCOSE MONITORING CHANGE EVERY 10 DAYS       calcium carbonate (OS-PATTI) 600 mg (1,500 mg) tablet Take 1,200 mg by mouth bedtime. Plus magnesium        cholecalciferol, vitamin D3, (VITAMIN D3) 2,000 unit cap Take 2,000 Units by mouth bedtime.        DEXCOM G6 SENSOR Lida U UTD FOR CONTINUOUS GLUCOSE MONITORING CHANGE Q 10 DAYS       insulin lispro (HUMALOG) 100 unit/mL injection 20 Units daily.       irbesartan-hydrochlorothiazide (AVALIDE) 150-12.5 mg per tablet Take 1 tablet by mouth daily. 90 tablet 3     lysine 500 mg Tab Take 500 mg by mouth 2 (two) times a day.       multivitamin therapeutic tablet Take 1 tablet by mouth daily.       tamoxifen (NOLVADEX) 20 MG tablet Take 1 tablet (20 mg total) by mouth daily. 90 tablet 3     No current facility-administered medications for this visit.      Allergies   Allergen Reactions     Nitroglycerin Other (See Comments)     Blood Pressure drops significantly.     Sulfa (Sulfonamide Antibiotics) Dizziness     Dizziness and doesn't feel normal.        Family and Social History   Family History   Problem Relation Age of Onset     Cancer Mother      Heart attack Mother      Cancer Father      Heart  "disease Father      CABG Father      Heart disease Sister         Social History     Tobacco Use     Smoking status: Former Smoker     Packs/day: 0.50     Years: 10.00     Pack years: 5.00     Smokeless tobacco: Never Used   Substance Use Topics     Alcohol use: Yes     Alcohol/week: 5.0 standard drinks     Types: 3 Glasses of wine, 2 Cans of beer per week     Drug use: No          Physical Exam   General Appearance:       /64 (Patient Site: Left Arm, Patient Position: Sitting, Cuff Size: Adult Large)   Pulse 68   Temp 97.8  F (36.6  C) (Tympanic)   Ht 5' 3\" (1.6 m)   Wt 101 lb (45.8 kg)   SpO2 97%   BMI 17.89 kg/m        NECK: Neck appearance was normal. There were no neck masses and the thyroid was not enlarged.  RESPIRATORY: Breathing pattern was normal and the chest moved symmetrically.  Percussion/auscultatory percussion was normal.  Lung sounds were normal and there were no abnormal sounds.  CARDIOVASCULAR: Heart rate and rhythm were normal.  S1 and S2 were normal and there were no extra sounds or murmurs. Peripheral pulses in arms and legs were normal.  Jugular venous pressure was normal.  There was no peripheral edema.  GASTROINTESTINAL: The abdomen was normal in contour.  Bowel sounds were present.  Percussion detected no organ enlargement or tenderness.  Palpation detected no tenderness, mass, or enlarged organs.   MUSCULOSKELETAL: Skeletal configuration was normal and muscle mass was normal for age. Joint appearance was overall normal.  LYMPHATIC: There were no enlarged nodes.  SKIN/HAIR/NAILS: Skin color was normal.  There were no skin lesions.  Hair and nails were normal.  NEUROLOGIC: The patient was alert and oriented to person, place, time, and circumstance. Speech was normal. Cranial nerves were normal. Motor strength was normal for age. The patient was normally coordinated.  PSYCHIATRIC:  Mood and affect were normal and the patient had normal recent and remote memory. The patient's " judgment and insight were normal.  Diabetic foot exam  Inspection normal . monfilament testing negative . pulses palpable      Additional Information        Jeana Al MD  Internal Medicine  Contact me at [phone]  Colon cancer 2025 next one

## 2021-06-15 NOTE — PROGRESS NOTES
[facility] Hematology and Oncology Progress Note    Patient: Sammi Guadalupe  MRN: [id number]  Date of Service:         Reason for Visit    Chief Complaint   Patient presents with     HE Cancer     Breast cancer       Assessment and Plan    T1 N0 M0, stage I right breast cancer, HER-2 positive by RT-PCR and fish, Oncotype score of 30, tumor ER/AL positive, diagnosis October 2013  Myalgias  Osteopenia    Patient continues to do well with no evidence of recurrence of her breast cancer.  She will continue anastrozole.  She will continue bisphosphonate for her osteopenia as well.  We will see her again in about 6 months with repeat bone density just before she returns.    Plan: Continue anastrozole  Follow-up in 6 months with bone density just before return      Measurable disease: None postoperatively        Current therapy: Anastrozole 1 mg by mouth daily started in April 2016 Tamoxifen 20 mg daily since May 2014        TREATMENT HISTORY: 1 year of Herceptin completed in February 2015    Taxotere carboplatin and Herceptin for 5 cycles completed in April 2014    One cycle of Taxotere and cyclophosphamide started January 30, 2014    Bilateral mastectomy in October 2013       Malignant neoplasm of lower-outer quadrant of female breast    Staging form: Breast, AJCC 7th Edition      Clinical: Stage IA (T1, N0, cM0) - Signed by Gagan Sanchez MD on 6/3/2014    ECOG Performance   ECOG Performance Status: 0    Distress Assessment  Distress Assessment Score: No distress    Pain           Problem List    1. Malignant neoplasm of lower-outer quadrant of female breast  DXA Bone Density Scan        CC: Earl Altman MD    ______________________________________________________________________________    History of Present Illness    Ms. Sammi Guadalupe is here for reevaluation.  She was seen last July.  Continues on anastrozole and is tolerating it fine.  No side effects from the medication.  No headaches or  dizziness.  No shortness of breath or cough.  No new bone or abdominal pain.  ECOG status is 0.  She continues on a bisphosphonate for osteopenia.    Pain Status  Currently in Pain: No/denies    Review of Systems    Constitutional  Constitutional (WDL): Exceptions to WDL  Neurosensory  Neurosensory (WDL): Exceptions to WDL  Peripheral Sensory Neuropathy: Asymptomatic, loss of deep tendon reflexes or paresthesia (Fingers and toes. Numbness. Slightly better than last assessed)  Cardiovascular  Cardiovascular (WDL): All cardiovascular elements are within defined limits  Pulmonary  Respiratory (WDL): Within Defined Limits  Gastrointestinal  Gastrointestinal (WDL): All gastrointestinal elements are within defined limits  Genitourinary  Genitourinary (WDL): All genitourinary elements are within defined limits  Integumentary  Integumentary (WDL): All integumentary elements are within defined limits  Patient Coping  Patient Coping: Accepting  Distress Assessment  Distress Assessment Score: No distress  Accompanied by  Accompanied by: Alone    Past History  Past Medical History:   Diagnosis Date     ASHD (arteriosclerotic heart disease) 2012    CT scan, calcium score 428 lad artery deployment of a non-drug-eluting stent in the LAD     Breast cancer     right     Coronary artery disease      Diabetes mellitus     type 2     DVT (deep venous thrombosis)      Family history of myocardial infarction      High cholesterol      Hyperlipidemia      Hypertension          Past Surgical History:   Procedure Laterality Date     BACK SURGERY       CARPAL TUNNEL RELEASE Bilateral      LAMINECTOMY AND MICRODISCECTOMY CERVICAL SPINE N/A      LAPAROSCOPIC HYSTERECTOMY N/A 12/12/2016    Procedure: ROBOTIC TOTAL LAPAROSCOPIC HYSTERECTOMY, BILATERAL SALPINGO-OOPHORECTOMY, CYSTOSCOPY;  Surgeon: Sohan Ramirez MD;  Location: [facility] - [place];  Service:      MASTECTOMY Bilateral        Physical Exam    Recent Vitals 1/15/2018   Height -    Weight 105 lbs 8 oz   BSA (m2) -   /62   Pulse 58   Temp 97.6   Temp src 1   SpO2 100   Some recent data might be hidden       GENERAL: Alert and oriented. Seated comfortably. In no distress.    HEAD: Atraumatic and normocephalic.  Has a full head of hair.    EYES: MINH, EOMI.  No pallor.  No icterus.    Oral cavity: no mucosal lesion or tonsillar enlargement.    NECK: supple. JVP normal.  No thyroid enlargement.    LYMPH NODES: No palpable, cervical, axillary or inguinal lymphadenopathy.    CHEST: clear to auscultation bilaterally.  Resonant to percussion throughout bilaterally.  Symmetrical breath movements bilaterally.    CVS: S1 and S2 are heard. Regular rate and rhythm.  No murmur or gallop or rub heard.    Breasts: She is status post bilateral mastectomies.  No locally recurrent disease.  No axillary adenopathy.    ABDOMEN: Soft. Not tender. Not distended.  No palpable hepatomegaly or splenomegaly.  No other mass palpable.  Bowel sounds heard.    EXTREMITIES: Warm.  No peripheral edema.  No tenderness to palpation over the upper arms    SKIN: no rash, or bruising or purpura.        Lab Results    No results found for this or any previous visit (from the past 168 hour(s)).    Imaging    No results found.      Signed by: Gagan Sanchez MD

## 2021-06-16 PROBLEM — M81.0 AGE-RELATED OSTEOPOROSIS WITHOUT CURRENT PATHOLOGICAL FRACTURE: Status: ACTIVE | Noted: 2018-07-17

## 2021-06-16 PROBLEM — E10.9 DIABETES MELLITUS TYPE I, CONTROLLED (H): Chronic | Status: ACTIVE | Noted: 2018-09-24

## 2021-06-16 PROBLEM — Z00.00 HEALTH MAINTENANCE EXAMINATION: Status: ACTIVE | Noted: 2021-01-03

## 2021-06-16 PROBLEM — E10.8 TYPE 1 DIABETES MELLITUS WITH COMPLICATION (H): Status: ACTIVE | Noted: 2020-03-14

## 2021-06-16 PROBLEM — T50.995S ADVERSE EFFECT OF OTHER DRUGS, MEDICAMENTS AND BIOLOGICAL SUBSTANCES, SEQUELA: Status: ACTIVE | Noted: 2018-11-26

## 2021-06-16 PROBLEM — Z96.41 INSULIN PUMP IN PLACE: Status: ACTIVE | Noted: 2019-03-27

## 2021-06-16 PROBLEM — Z95.5 S/P CORONARY ARTERY STENT PLACEMENT: Chronic | Status: ACTIVE | Noted: 2018-09-24

## 2021-06-16 NOTE — TELEPHONE ENCOUNTER
"Telephone Encounter by Freda Guillermo CMA at 7/16/2019  8:52 AM     Author: Freda Guillermo CMA Service: -- Author Type: Certified Medical Assistant    Filed: 7/16/2019  8:58 AM Encounter Date: 7/12/2019 Status: Signed    : Freda Guillermo CMA (Certified Medical Assistant)       Patient Returning Call  Reason for call: Calling to check status of Medication clarification.   Information relayed to patient:  No msg to relay - 3rd request for reply . Please bring resolution to this matter.   Patient has additional questions:  Yes  If YES, what are your questions/concerns:  As listed below - ATTACHED Note from 07/12/19  Okay to leave a detailed message?: Yes    Please advise STAT    Signed             []Hide copied text    []Hover for details  Medication Question or Clarification  Who is calling: Patient  What medication are you calling about? (include dose and sig) Prolia 60 mg every 6 months  Who prescribed the medication?: Dr Sanchez in Oncology   What is your question/concern?: The patient was informed by the above mentioned provider that, based on her last DEXA scan, she would not need to continue this medication any long. She is requesting Dr Altman's recommendation as she feels there is, \"A lack of communication\".   Pharmacy: N/A  Okay to leave a detailed message?: Yes  Site CMT - Please call the pharmacy to obtain any additional needed information.                  "

## 2021-06-16 NOTE — TELEPHONE ENCOUNTER
Telephone Encounter by Sourav Morillo at 7/22/2019 11:14 AM     Author: Sourav Morillo Service: -- Author Type: Financial Resource Guide    Filed: 7/22/2019 11:17 AM Encounter Date: 7/22/2019 Status: Signed    : Sourav Morillo (Financial Resource Guide)       PA for exemestane initiated.     Medication Prior Authorization    Start Date:  7/22/19  Type:  New  Urgency:  Urgent  Med Type:  Specialty  Insurance Info:  [employer] ([facility]) - Phone [phone] Fax [phone]  Method:  ePA  Reference #:  Key: [id number] - PA Case ID: [id number]  ___________________________________________________________________________________________________________________:

## 2021-06-17 NOTE — PATIENT INSTRUCTIONS - HE
Patient Instructions by Gagan Sanchez MD at 7/9/2019  9:45 AM     Author: Gagan Sanchez MD Service: -- Author Type: Physician    Filed: 7/9/2019 10:15 AM Encounter Date: 7/9/2019 Status: Signed    : Gagan Sanchez MD (Physician)       Patient Education     Exemestane tablets  Brand Name: Aromasin  What is this medicine?  EXEMESTANE (ex e MES tane) blocks the production of the hormone estrogen. Some types of breast cancer depend on estrogen to grow, and this medicine can stop tumor growth by blocking estrogen production. This medicine is for the treatment of breast cancer in postmenopausal women only.  How should I use this medicine?  Take this medicine by mouth with a glass of water. Follow the directions on the prescription label. Take your doses at regular intervals after a meal. Do not take your medicine more often than directed. Do not stop taking except on the advice of your doctor or health care professional.  Contact your pediatrician regarding the use of this medicine in children. Special care may be needed.  What side effects may I notice from receiving this medicine?  Side effects that you should report to your doctor or health care professional as soon as possible:    any new or unusual symptoms    changes in vision    fever    leg or arm swelling    pain in bones, joints, or muscles    pain in hips, back, ribs, arms, shoulders, or legs  Side effects that usually do not require medical attention (report to your doctor or health care professional if they continue or are bothersome):    difficulty sleeping    headache    hot flashes    sweating    unusually weak or tired  What may interact with this medicine?  Do not take this medicine with any of the following medications:    female hormones, like estrogens and birth control pills  This medicine may also interact with the following medications:    androstenedione    phenytoin    rifabutin, rifampin,  or rifapentine    [place] Wort  What if I miss a dose?  If you miss a dose, take the next dose as usual. Do not try to make up the missed dose. Do not take double or extra doses.  Where should I keep my medicine?  Keep out of the reach of children.  Store at room temperature between 15 and 30 degrees C (59 and 86 degrees F). Throw away any unused medicine after the expiration date.  What should I tell my health care provider before I take this medicine?  They need to know if you have any of these conditions:    an unusual or allergic reaction to exemestane, other medicines, foods, dyes, or preservatives    pregnant or trying to get pregnant    breast-feeding  What should I watch for while using this medicine?  Visit your doctor or health care professional for regular checks on your progress.  If you experience hot flashes or sweating while taking this medicine, avoid alcohol, smoking and drinks with caffeine. This may help to decrease these side effects.  Do not become pregnant while taking this medicine or for 1 month after stopping it. Women should inform their doctor if they wish to become pregnant or think they might be pregnant. Women of child-bearing potential will need to have a negative pregnancy test before starting this medicine. There is a potential for serious side effects to an unborn child. Do not breast-feed an infant while taking this medicine or for 1 month after stopping it. Talk to your health care professional or pharmacist for more information.  NOTE:This sheet is a summary. It may not cover all possible information. If you have questions about this medicine, talk to your doctor, pharmacist, or health care provider. Copyright  2018 [employer]

## 2021-06-18 NOTE — PATIENT INSTRUCTIONS - HE
Patient Instructions by Jeana Al MD at 12/30/2020  1:20 PM     Author: Jeana Al MD Service: -- Author Type: Physician    Filed: 1/3/2021  1:56 PM Encounter Date: 12/30/2020 Status: Addendum    : Jeana Al MD (Physician)    Related Notes: Original Note by Jeana Al MD (Physician) filed at 12/30/2020  2:06 PM       Tetanus , and shingrix are due .  tetanus toxoid can be done now its every ten years and you are past due    shingrix can be deferred because you already had the old shingles vaccine    . They prefer not the covid vaccine contiguously with other vaccines because they want to monitor side effects for data collection     Labs are due in march    eye exam should be done by early next year      Patient Education   Depression and Suicide in Older Adults  Nearly 2 million older Americans have some type of depression. Sadly, some of them even take their own lives. Yet depression among older adults is often ignored. Learn the warning signs. You may help spare a loved one needless pain. You may also save a life.       What Is Depression?  Depression is a mood disorder that affects the way you think and feel. The most common symptom is a feeling of deep sadness. People who are depressed also may seem tired and listless. And nothing seems to give them pleasure. Its normal to grieve or be sad sometimes. But sadness lessens or passes with time. Depression rarely goes away or improves on its own. Other symptoms of depression are:    Sleeping more or less than normal    Eating more or less than normal    Having headaches, stomachaches, or other pains that dont go away    Feeling nervous, empty, or worthless    Crying a great deal    Thinking or talking about suicide or death    Feeling confused or forgetful  What Causes It?  The causes of depression arent fully known. Certain chemicals in the brain play a role. Depression does run in families. And life stresses can also  trigger depression in some people. That may be the case with older adults. They often face great burdens, such as the death of friends or a spouse. They may have failing health. And they are more likely to be alone, lonely, or poor.  How You Can Help  Often, depressed people may not want to ask for help. When they do, they may be ignored. Or, they may receive the wrong treatment. You can help by showing parents and older friends love and support. If they seem depressed, help them find the right treatment. Talk to your doctor. Or contact a local mental health center, social service agency, or hospital. With modern treatment, no one has to suffer from depression.  Resources:    National [place] of [employer]  612.608.1542  www.nimh.nih.gov    National [place] on Mental Illness  545.254.2885  www.heather.org    [facility] [place]  142.843.6396  www.nmha.org    National Suicide Hotline  396.505.6316 (800-SUICIDE)      6212-1660 [employer]. [address] [place] [address]. All rights reserved. This information is not intended as a substitute for professional medical care. Always follow your healthcare professional's instructions.           Advance Directive  Patients advance directive was discussed and I am comfortable with the patients wishes.  Patient Education   Personalized Prevention Plan  You are due for the preventive services outlined below.  Your care team is available to assist you in scheduling these services.  If you have already completed any of these items, please share that information with your care team to update in your medical record.  Health Maintenance   Topic Date Due   ? ZOSTER VACCINES (2 of 2) 12/26/2011   ? DIABETIC FOOT EXAM  05/25/2018   ? DIABETIC EYE EXAM  05/25/2018   ? TD 18+ HE  10/22/2019   ? A1C  04/19/2021   ? BMP  10/19/2021   ? LIPID  10/19/2021   ? MICROALBUMIN  10/19/2021   ? FALL RISK ASSESSMENT  10/19/2021   ? MEDICARE ANNUAL WELLNESS VISIT   12/30/2021   ? COLORECTAL CANCER SCREENING  04/20/2025   ? ADVANCE CARE PLANNING  12/30/2025   ? DEXA SCAN  06/28/2033   ? HEPATITIS C SCREENING  Completed   ? Pneumococcal Vaccine: Pediatrics (0 to 5 Years) and At-Risk Patients (6 to 64 Years)  Completed   ? Pneumococcal Vaccine: 65+ Years  Completed   ? INFLUENZA VACCINE RULE BASED  Completed

## 2021-06-18 NOTE — PATIENT INSTRUCTIONS - HE
"Patient Instructions by Pro Blank PA-C at 5/25/2021 10:40 AM     Author: Pro Blank PA-C Service: -- Author Type: Physician Assistant    Filed: 5/25/2021 10:50 AM Encounter Date: 5/25/2021 Status: Signed    : Pro Blank PA-C (Physician Assistant)       Monitor for stage I Lyme disease symptoms over the next 3 to 30 days.  If you develop stage I Lyme disease symptoms return to your primary care provider for reevaluation treatment or return to the walk-in care if you are unable to get an appointment with your primary care provider.  Take the medication as written.  This is a one-time dose for Lyme disease prophylactic treatment.  Follow suggestions in the handouts below for tick bites.  If the Lyme disease test was drawn for you, you will have follow-up next week with your primary care provider for reevaluation and interpretation of the laboratory value and discussion of the the treatment plan going forward after the lab results are returned.      Patient Education   Tick Bites  Ticks are small arachnids that feed on the blood of rodents, rabbits, birds, deer, dogs, and people. A tick bite may cause a reaction like a spider bite. You may have redness, itching, and slight swelling at the site. Sometimes you may have no reaction where the tick bit you.  Ticks may gorge themselves for days before you find and remove them. The bites themselves aren't cause for concern. But ticks can carry and pass on illnesses such as Lyme disease and [place] spotted fever. Both diseases begin with a rash and symptoms similar to the flu. In advanced stages, these diseases can be quite serious.     A \"bull's eye\" rash is a common symptom of Lyme disease.   When to go to the emergency room (ER)  Not all ticks carry disease. And a tick must stay attached for at least 24 hours to infect you. If you find a tick, don't panic. Try to carefully remove it with tweezers. Grasp the tick near its head and pull without twisting. " If you can't easily dislodge the tick or if you leave the head in your skin, get medical care right away.  What to expect in the ER    The tick or any parts of the tick will be removed and the bite will be cleaned.    To prevent disease, you may be given antibiotics. Both Lyme disease and [place] spotted fever respond quickly to these medicines.    You may be asked to see your healthcare provider for a blood test to check for Lyme disease.  Follow-up care  Some states and [facility] have services that test ticks for Lyme disease and other diseases. Check with your local officials to see if this service is available in your area.  If you remove a tick yourself, watch for signs of a tick-borne illness. Symptoms may show up within a few days or weeks after a bite. Call your healthcare provider if you notice any of the following:    Rash. The rash may spread outward in a ring from a hard white lump. Or it may move up your arms and legs to your chest.    Chills and fever    Body aches and joint pain    Severe headache  Date Last Reviewed: 12/1/2016 2000-2017 [employer]. [address] [place] [address]. All rights reserved. This information is not intended as a substitute for professional medical care. Always follow your healthcare professional's instructions.         Patient Education   Preventing Lyme Disease  Ticks are small spider-like arachnids that feed on the blood of animals and humans. They can carry the bacteria that cause Lyme disease. The bacteria can pass to a person from a tick bite. (It is not passed from person to person.) Lyme disease can lead to serious health problems if it is not treated with antibiotics. The best way to prevent Lyme disease is to avoid being bitten by a tick.     The tick that carries Lyme disease is very small--about the size of a poppy seed.   Preventing tick bites  The disease is carried by the blacklegged tick, also called a deer tick. Young ticks  called nymphs are the most likely to carry the bacteria. They are very small--about the size of a poppy seed. They can be found on deer, rodents, and birds. Often the animal brushes the tick onto leaves or other plants as it runs through the woods and then the tick lives in bushes, grasses, and dead leaves. The most active time of year for infected ticks varies by region of the country. In the [place] and [place], they are more active from April to September. In the West, they may be more active from December to February. But you can still be bitten at other times of the year. Below are tips for protecting yourself from tick bites.  Protect your body    Wear clothes that protect you. Clothing can help protect you from tick bites. Wear long pants and long sleeves in outdoor areas where ticks may live. Tuck your shirt into your pants. Tuck pant legs into your socks. And wear light colors so you can more easily see ticks on your clothes.    Use insect repellent. Spray insect repellent containing at least 20 percent DEET on your exposed skin. You can also use it on clothing, shoes, and camping gear. Avoid getting DEET on childrens hands, mouth, or eyes. You can use products with permethrin on clothing, shoes, and camping gear. But do not spray permethrin on skin. It will cause a rash. Follow the directions on the package of the spray you use. For more information on bug sprays, visit the National Pesticide Information Center at http://npic.[facility].edu.    Avoid tick-infested areas. Avoid brushing against grasses, bushes, and other plants. Avoid walking through dead leaves and other ground vegetation. Do not sit on fallen logs. And avoid areas with large numbers of deer and rodents.    Check yourself for ticks. After being outdoors, check your clothes and skin for ticks. Keep in mind that the ticks are about the size of a poppy seed. Use both a hand-held and a full-length mirror to view all of your skin. Pay special attention  to areas with hair. Make sure to thoroughly check these areas:  ? Scalp  ? Behind the ears  ? Armpits  ? Belly button  ? Waist  ? Groin  ? Backs of the knees    Use the clothes dryer. Putting clothing or bedding into a clothes dryer for 1 hour at high heat has been shown to kill ticks.  Control ticks around your home    Create tick-free safety zones. Ticks that transmit Lyme disease live in ground vegetation. Ticks crawl onto people from shrubs and grasses in and around wooded areas. Cut bushes and other plants away from the deck or patio and any child play areas. Keep all grasses cut short. Remove dead leaves and other dead vegetation. Do not put childrens play equipment near wooded areas. Put wood chips or gravel on the ground between lawns and wooded areas.    Use pesticides. You can apply them yourself or hire a pest control expert. States have different regulations about pesticides. Ask your local health department for information.    Keep deer away. Deer often carry the ticks that can infect you with Lyme disease. Do not attempt to pet or feed deer. Ask your local [facility] about deer-resistant plants. Ask your local health department about deer control in your area.    Prevent ticks on pets. Pets can bring ticks into your home. Use tick control medicine as advised by your . Check your pet for ticks after it comes indoors. Pay special attention to the ears.    Ask about local tick-control methods. Some states have tick-control programs. Local health departments may be using methods that can help you control ticks at home.  If you have a tick  If you find a tick on your skin, do not panic. Most ticks don't carry Lyme disease. And the tick must be attached for 36 to 48 hours before it might infect you. If you find a tick on you, heres what to do:    If the tick is not yet attached to your skin, remove the tick with tweezers or a tissue. Flush it down the toilet. If you see a tick on your clothes,  use a piece of tape to lift it off. Do not touch it with your bare hands.    If the tick is attached to your skin:  ? Carefully remove the tick with tweezers. If you dont have tweezers, use your fingers protected by a paper towel or a thin cloth. Grasp the tick as close to your skin as possible. Pull slowly and gently to remove the tick. The tick may not let go right away. Do not pull harder. Be patient and keep trying gently. Do not twist the head or body as you pull. Do not crush or squeeze the body. This can release the bacteria into your body. Never use a hot match, petroleum jelly, or other products to remove a tick. (If you cant remove the tick or if part of the tick remains in the skin, call your healthcare provider right away.)  ? Wash your skin with soap and water after you remove the tick. This will help ensure you remove any bacteria.  ? If you can, save the tick in a tightly sealed glass or plastic container. Take it to your healthcare provider. He or she may be able to have someone identify if it is the type of tick that transmits Lyme.  ? Call your healthcare provider and describe the bite and the tick. You may be asked to come in for an exam. You may be tested for Lyme. You may also be prescribed antibiotics to help prevent infection.  ? Over the next month, watch for the symptoms below, especially a rash at the site of the bite.  Symptoms of Lyme disease  Call your healthcare provider if you develop any symptoms of Lyme disease, even if you dont remember being bitten. These include:    A round, red rash (called a bulls-eye rash)    Fever and chills    Tiredness or body aches    Headache or a stiff neck    Joint pain and swelling (arthritis), especially in large joints such as the knees   To learn more    Centers for Disease Control and Prevention: www.cdc.gov/lyme    American Lyme Disease Foundation: www.aldf.com  Date Last Reviewed: 11/1/2016 2000-2017 [employer]. [address], [place] [address]. All rights reserved. This information is not intended as a substitute for professional medical care. Always follow your healthcare professional's instructions.         Patient Education   Tick Bite (No Antibiotics)    You have been bitten by a tick. Ticks are small arachnids that feed on the blood of rodents, rabbits, birds, deer, dogs, and people. A tick bite may cause a reaction like a spider bite. You may have redness, itching, and slight swelling at the site. Sometimes you may have no reaction where the tick bit you.  Most tick bites are harmless. But some ticks carry diseases, such as Lyme disease or [place] spotted fever. These can be passed to people at the time of the bite. Lyme disease is of greatest concern. Right now you have no symptoms of Lyme disease or other serious reaction to the bite. It is important to watch for the warning signs, which could appear weeks to months after the tick bite.  Home care  The following will help you care for your bite at home:    If itching is a problem, avoid tight clothing and anything that heats up your skin. This includes hot showers or baths and direct sunlight. This will tend to make the itching worse.    An ice pack will reduce local areas of redness and itching. Make your own ice pack by putting ice cubes in a zip-top plastic bag and wrapping it in a thin towel. Creams containing benzocaine will reduce itching. These creams are available over the counter.    You can use an antihistamine with diphenhydramine if your doctor did not give you another antihistamine. This medicine may be used to reduce itching if large areas of the skin are involved. It is available at drugstores and groceries. If symptoms continue, talk with your doctor or pharmacist about over-the-counter medicines.  Follow-up care  Follow up with your healthcare provider, or as advised.  When to seek medical advice  Call your healthcare provider right away if any of  these occur:  Signs of local infection. Watch for these during the next few days.    Increasing redness around the bite site    Increased pain or swelling    Fever over 100.4 F (38.0 C), or as directed by your healthcare provider    Fluid draining from the bite area  Signs of tick-related disease. Watch for these during the next few weeks to months.    Circular, red, ring-like rash appears at the bite area within 1 to 3 weeks    A non-itchy red rash develops on your wrists or ankles and spreads. It may become purple (petechiae).    Red eyes    Tiredness, fever or chills, nausea or vomiting    Neck pain or stiffness, headache, or confusion    Muscle or bone aches    Irregular or rapid heartbeat    Joint pain or swelling, especially in the knee    Numbness, tingling, or weakness in the arms or legs    Weakness on one side of the face  Date Last Reviewed: 10/1/2016    5769-6245 [employer]. [address] [place] [address]. All rights reserved. This information is not intended as a substitute for professional medical care. Always follow your healthcare professional's instructions.         Patient Education   Tick Facts    Ticks are small arachnids that feed on the blood of rodents, rabbits, birds, deer, dogs, and humans. Ticks do not fly and do not drop from trees. They climb to the tips of plants along trails and attach themselves to you as you brush against the plant. Ticks may also attach to you if you come in contact with an infested animal.  Avoiding ticks  The following tips will help you avoid ticks:    When walking in tick-infested areas, tuck your pants into your boots or socks, and tuck your shirt into your pants.    Wear light-colored clothing so you can easily see any ticks that get on you.    Apply a tick repellent to pants, socks, and shoes.    Avoid brushing against the plants along trails.    Check yourself and your children at the end of each day when hiking through tick-infested  areas. Don't forget to check in your hair as well.  Removing ticks  Removing ticks right away will reduce the chance of disease. Here are some tips for removing ticks:    If possible, have someone else remove the tick from you.    Protect your hands from exposure to the tick by using a tissue or rubber glove.    Ticks have hook-like barbs on their mouth, which they use to attach themselves. Use tweezers or small needle-nose pliers instead of your fingers when removing a tick. Grasp the head as close to the skin as possible. Be careful not to squeeze the body, which would inject more fluid from the tick into your skin.    Pull gently and slowly away from skin until the mouthparts let go. If the tick fails to let go, stop pulling. While holding the head with tweezers, slowly turn it 90 degrees. Then, gently pull away from the skin again. This movement may unlock the tick's jaw and make it easier to remove.    Once you have removed the tick, look closely at the bite area. If you think there are still parts of the tick in your skin that you can't remove, contact your doctor.    Wash your hands and the bite site with soap and water.  Do not:    Crush or squeeze the tick with the tweezers.    Jerk the tick.    Burn or prick the tick.    Try to suffocate the tick with petroleum jelly or nail polish.  Identifying ticks  Most tick bites are harmless. But some ticks carry diseases, such as Lyme disease and [place] spotted fever. These can spread to people. Lyme disease is of greatest concern. It is carried by only one type of tick, the deer tick. Many public health departments are able to identify a tick to figure out if it is the type that causes Lyme disease. If this service is available in your area, bring the removed tick in a zip-top bag or jar to the public health department for identification. If you cannot identify the tick and if you were bitten in an area of the country where there is a risk of Lyme disease,  contact your doctor.  Date Last Reviewed: 9/1/2016 2000-2017 [employer]. [address] [place] [address], [place] [address]. All rights reserved. This information is not intended as a substitute for professional medical care. Always follow your healthcare professional's instructions.

## 2021-06-18 NOTE — PATIENT INSTRUCTIONS - HE
Patient Instructions by Earl Altman MD at 10/19/2020  8:00 AM     Author: Earl Altman MD Service: -- Author Type: Physician    Filed: 10/19/2020  7:55 AM Encounter Date: 10/19/2020 Status: Signed    : Earl Altman MD (Physician)         Patient Education   Depression and Suicide in Older Adults  Nearly 2 million older Americans have some type of depression. Sadly, some of them even take their own lives. Yet depression among older adults is often ignored. Learn the warning signs. You may help spare a loved one needless pain. You may also save a life.       What Is Depression?  Depression is a mood disorder that affects the way you think and feel. The most common symptom is a feeling of deep sadness. People who are depressed also may seem tired and listless. And nothing seems to give them pleasure. Its normal to grieve or be sad sometimes. But sadness lessens or passes with time. Depression rarely goes away or improves on its own. Other symptoms of depression are:    Sleeping more or less than normal    Eating more or less than normal    Having headaches, stomachaches, or other pains that dont go away    Feeling nervous, empty, or worthless    Crying a great deal    Thinking or talking about suicide or death    Feeling confused or forgetful  What Causes It?  The causes of depression arent fully known. Certain chemicals in the brain play a role. Depression does run in families. And life stresses can also trigger depression in some people. That may be the case with older adults. They often face great burdens, such as the death of friends or a spouse. They may have failing health. And they are more likely to be alone, lonely, or poor.  How You Can Help  Often, depressed people may not want to ask for help. When they do, they may be ignored. Or, they may receive the wrong treatment. You can help by showing parents and older friends love and support. If they seem depressed, help them find the  right treatment. Talk to your doctor. Or contact a local mental health center, social service agency, or hospital. With modern treatment, no one has to suffer from depression.  Resources:    National [place] of Mental Health  128.671.6788  www.nimh.nih.gov    National [place] on Mental Illness  411.920.6181  www.heather.org    [facility] [place]  539.335.3599  www.[facility].org    National Suicide Hotline  577.978.3984 (800-SUICIDE)      8198-4278 [employer]. [address] [place] [address]. All rights reserved. This information is not intended as a substitute for professional medical care. Always follow your healthcare professional's instructions.           Advance Directive  Patients advance directive was discussed and I am comfortable with the patients wishes.  Patient Education   Personalized Prevention Plan  You are due for the preventive services outlined below.  Your care team is available to assist you in scheduling these services.  If you have already completed any of these items, please share that information with your care team to update in your medical record.  Health Maintenance   Topic Date Due   ? HEPATITIS B VACCINES (1 of 3 - Risk 3-dose series) 11/21/1971   ? ZOSTER VACCINES (2 of 2) 12/26/2011   ? DIABETIC FOOT EXAM  05/25/2018   ? DIABETIC EYE EXAM  05/25/2018   ? Pneumococcal Vaccine: Pediatrics (0 to 5 Years) and At-Risk Patients (6 to 64 Years) (3 of 3 - PPSV23) 11/19/2018   ? Pneumococcal Vaccine: 65+ Years (2 of 2 - PPSV23) 09/24/2019   ? MICROALBUMIN  09/24/2019   ? TD 18+ HE  10/22/2019   ? A1C  06/03/2020   ? DXA SCAN  06/28/2020   ? INFLUENZA VACCINE RULE BASED (1) 08/01/2020   ? LIPID  01/23/2021   ? BMP  07/23/2021   ? MEDICARE ANNUAL WELLNESS VISIT  10/19/2021   ? FALL RISK ASSESSMENT  10/19/2021   ? ADVANCE CARE PLANNING  10/14/2024   ? COLORECTAL CANCER SCREENING  04/20/2025   ? HEPATITIS C SCREENING  Completed

## 2021-06-19 NOTE — PROGRESS NOTES
[facility] Progress Note    Patient: Sammi Guadalupe  MRN: [id number]  Date of Service:         Reason for Visit    Chief Complaint   Patient presents with     HE Cancer     Malignant neoplasm of lower-outer quadrant of female breast       Assessment and Plan    T1 N0 M0, stage I right breast cancer, HER-2 positive by RT-PCR and fish, Oncotype score of 30, tumor ER/RI positive, diagnosis October 2013  Myalgias  Osteopenia    Patient is doing well with no evidence of recurrence of breast cancer.  She is tolerating anastrozole well and will continue the medication.  We will see her again in 6 months for follow-up.    She does have some myalgias which she feels may be related to Actonel.  She had similar symptoms with Fosamax which improved after making the switch to Actonel.  We discussed Prolia as an alternative.  I gave her information about the medication.  It could potentially have similar side effects.  Will look to see if the medication is approved by her insurance and then initiate Prolia if possible.    Bone density reviewed and it is stable.    Plan: Continue anastrozole  Continue calcium with vitamin D  Follow-up in 6 months    Measurable disease: None postoperatively        Current therapy: Anastrozole 1 mg by mouth daily started in April 2016 Tamoxifen 20 mg daily since May 2014        TREATMENT HISTORY: 1 year of Herceptin completed in February 2015    Taxotere carboplatin and Herceptin for 5 cycles completed in April 2014    One cycle of Taxotere and cyclophosphamide started January 30, 2014    Bilateral mastectomy in October 2013       Malignant neoplasm of lower-outer quadrant of female breast    Staging form: Breast, AJCC 7th Edition      Clinical: Stage IA (T1, N0, cM0) - Signed by Gagan Sanchez MD on 6/3/2014    ECOG Performance   ECOG Performance Status: 1    Distress Assessment  Distress Assessment Score: No distress    Pain           Problem List    1.  Malignant neoplasm of lower-outer quadrant of female breast (H)  Basic Metabolic Panel        CC: Earl Altman MD    ______________________________________________________________________________    History of Present Illness    Ms. Sammi Guadalupe is here for reevaluation.  She was seen 6 months ago.  Continues on anastrozole and calcium with vitamin D and Actonel.  Describes some achiness in the shoulders in the morning which she thinks is related to Actonel.  Had similar problems on Fosamax which resolved after initial switch to Actonel.    No headaches or dizziness.  No shortness of breath or cough.  No new bone or abdominal pain.  Appetite and weight are stable.  ECOG status is 0.  Patient is recently retired and may of 2018.      Pain Status  Currently in Pain: No/denies    Review of Systems    Constitutional  Constitutional (WDL): All constitutional elements are within defined limits  Neurosensory  Neurosensory (WDL): Exceptions to WDL  Peripheral Sensory Neuropathy: Asymptomatic, loss of deep tendon reflexes or paresthesia (Fingers and toes. Numbness. Fingers are a bit better. )  Cardiovascular  Cardiovascular (WDL): All cardiovascular elements are within defined limits  Pulmonary  Respiratory (WDL): Within Defined Limits  Gastrointestinal  Gastrointestinal (WDL): All gastrointestinal elements are within defined limits  Genitourinary  Genitourinary (WDL): All genitourinary elements are within defined limits  Integumentary  Integumentary (WDL): All integumentary elements are within defined limits  Patient Coping  Patient Coping: Accepting  Distress Assessment  Distress Assessment Score: No distress  Accompanied by  Accompanied by: Alone    Past History  Past Medical History:   Diagnosis Date     ASHD (arteriosclerotic heart disease) 2012    CT scan, calcium score 428 lad artery deployment of a non-drug-eluting stent in the LAD     Breast cancer (H)     right     Coronary artery disease      Diabetes  mellitus (H)     type 2     DVT (deep venous thrombosis) (H)      Family history of myocardial infarction      High cholesterol      Hyperlipidemia      Hypertension          Past Surgical History:   Procedure Laterality Date     BACK SURGERY       CARPAL TUNNEL RELEASE Bilateral      LAMINECTOMY AND MICRODISCECTOMY CERVICAL SPINE N/A      LAPAROSCOPIC HYSTERECTOMY N/A 12/12/2016    Procedure: ROBOTIC TOTAL LAPAROSCOPIC HYSTERECTOMY, BILATERAL SALPINGO-OOPHORECTOMY, CYSTOSCOPY;  Surgeon: Sohan Ramirez MD;  Location: [facility];  Service:      MASTECTOMY Bilateral        Physical Exam    Recent Vitals 7/9/2018   Height -   Weight 101 lbs 8 oz   BSA (m2) -   /64   Pulse 68   Temp 97.8   Temp src 1   SpO2 100   Some recent data might be hidden       GENERAL: Alert and oriented. Seated comfortably. In no distress.    HEAD: Atraumatic and normocephalic.  Has a full head of hair.    EYES: MINH, EOMI.  No pallor.  No icterus.    Oral cavity: no mucosal lesion or tonsillar enlargement.    NECK: supple. JVP normal.  No thyroid enlargement.    LYMPH NODES: No palpable, cervical, axillary or inguinal lymphadenopathy.    CHEST: clear to auscultation bilaterally.  Resonant to percussion throughout bilaterally.  Symmetrical breath movements bilaterally.    CVS: S1 and S2 are heard. Regular rate and rhythm.  No murmur or gallop or rub heard.    Breasts: She is status post bilateral mastectomies.  No locally recurrent disease.  No axillary adenopathy.    ABDOMEN: Soft. Not tender. Not distended.  No palpable hepatomegaly or splenomegaly.  No other mass palpable.  Bowel sounds heard.    EXTREMITIES: Warm.  No peripheral edema.  No tenderness to palpation over the upper arms    SKIN: no rash, or bruising or purpura.        Lab Results    No results found for this or any previous visit (from the past 168 hour(s)).    Imaging    Dxa Bone Density Scan    Result Date: 7/1/2018 6/28/2018 RE: Sammi Guadalupe Date of Birth:  "1950 Dear Gagan Sanchez, Patient Profile: 67 y.o. female, postmenopausal, is here for the follow up bone density test. History of fractures - None. Family history of osteoporosis - Yes;  mother.  Family history of hip fracture: None. Smoking history - Past. Osteoporosis treatment past -  Yes;  Bisphosphonates. Osteoporosis treatment current - Yes;  Bisphosphonates.  Chronic medical problems - Breast cancer, Diabetes Mellitus and Spine surgery. High risk medications -  Blood thinner (Coumadin or Heparin);  Yes, in the Past, Chemotherapy;  Yes, in the Past and Aromatase Inhibitor;  Yes, Currently. Assessment: 1. The spine bone density L1-L2 with T-score 0.0, stable compared to 2016. 2. Femoral bone densities show left femoral neck T- score -1.5 and right femoral neck T-score -1.3, stable. 3. Trabecular bone score indicates good trabecular bone architecture. 67 y.o. female with LOW BONE DENSITY (OSTEOPENIA), stable on the current treatment. Recommendations: Appropriate calcium and vitamin D supplements and \"drug holiday\" after 5 years on bisphosphonate treatment, recommended with follow up bone density scan in 2 years. Bone densitometry was performed on your patient using our BlossomXA densitometer. The results are summarized and a copy of the actual scans are included for your review. In conformity with the International Society of Clinical Densitometry's most recent position statement for DXA interpretation (2015), the diagnosis will be made on the lowest measured T-score of the lumbar spine, femoral neck, total proximal femur or 33% radius. Note the change in terminology for diagnostic classification from OSTEOPENIA to LOW BONE MASS. All trending for sequential exams will be done using multiple vertebrae or the total proximal femur. Fracture risk is based on the WHO Fracture Risk Assessment Tool (FRAX). If additional information is needed or if you would like to discuss the results, please " do not hesitate to call me. Thank you for referring this patient to [facility]. We are happy to be of service in support of you and your practice. If you have any questions or suggestions to improve our service, please call me at [phone]. Sincerely, Shannan Hinojosa M.D. C.C.TORITO. Osteoporosis Services, [facility]         Signed by: Gagan Sanchez MD

## 2021-06-19 NOTE — PROGRESS NOTES
Patient arrived ambulatory this am for Prolia injection. Lab results reviewed and noted WNL. AVS and Prolia Care Notes printed and reviewed with patient. Patient discharged to home/ reports will return at next scheduled appointment.

## 2021-06-19 NOTE — LETTER
Letter by Earl Altman MD at      Author: Earl Altman MD Service: -- Author Type: --    Filed:  Encounter Date: 10/14/2019 Status: Signed         Sammi Guadalupe  [address] [place] [address]             October 14, 2019         Dear Ms. Guadalupe,    Below are the results from your recent visit:    Resulted Orders   Comprehensive Metabolic Panel   Result Value Ref Range    Sodium 139 136 - 145 mmol/L    Potassium 4.7 3.5 - 5.0 mmol/L    Chloride 100 98 - 107 mmol/L    CO2 28 22 - 31 mmol/L    Anion Gap, Calculation 11 5 - 18 mmol/L    Glucose 121 70 - 125 mg/dL    BUN 14 8 - 22 mg/dL    Creatinine 0.75 0.60 - 1.10 mg/dL    GFR MDRD Af Amer >60 >60 mL/min/1.73m2    GFR MDRD Non Af Amer >60 >60 mL/min/1.73m2    Bilirubin, Total 0.9 0.0 - 1.0 mg/dL    Calcium 10.2 8.5 - 10.5 mg/dL    Protein, Total 7.3 6.0 - 8.0 g/dL    Albumin 4.4 3.5 - 5.0 g/dL    Alkaline Phosphatase 55 45 - 120 U/L    AST 31 0 - 40 U/L    ALT 21 0 - 45 U/L    Narrative    Fasting Glucose reference range is 70-99 mg/dL per  American Diabetes Association (ADA) guidelines.   Lipid Cascade   Result Value Ref Range    Cholesterol 144 <=199 mg/dL    Triglycerides 41 <=149 mg/dL    HDL Cholesterol 83 >=50 mg/dL    LDL Calculated 53 <=129 mg/dL    Patient Fasting > 8hrs? Yes    Thyroid Cascade   Result Value Ref Range    TSH 2.07 0.30 - 5.00 uIU/mL   Vitamin D, Total (25-Hydroxy)   Result Value Ref Range    Vitamin D, Total (25-Hydroxy) 53.4 30.0 - 80.0 ng/mL    Narrative    Deficiency <10.0 ng/mL  Insufficiency 10.0-29.9 ng/mL  Sufficiency 30.0-80.0 ng/mL  Toxicity (possible) >100.0 ng/mL   Hepatitis C Antibody (Anti-HCV)   Result Value Ref Range    Hepatitis C Ab Negative Negative   HM1 (CBC with Diff)   Result Value Ref Range    WBC 8.8 4.0 - 11.0 thou/uL    RBC 4.79 3.80 - 5.40 mill/uL    Hemoglobin 15.2 12.0 - 16.0 g/dL    Hematocrit 45.2 35.0 - 47.0 %    MCV 94 80 - 100 fL    MCH 31.7 27.0 - 34.0 pg    MCHC 33.6 32.0 -  36.0 g/dL    RDW 12.1 11.0 - 14.5 %    Platelets 294 140 - 440 thou/uL    MPV 6.5 (L) 7.0 - 10.0 fL    Neutrophils % 60 50 - 70 %    Lymphocytes % 31 20 - 40 %    Monocytes % 6 2 - 10 %    Eosinophils % 2 0 - 6 %    Basophils % 1 0 - 2 %    Neutrophils Absolute 5.3 2.0 - 7.7 thou/uL    Lymphocytes Absolute 2.7 0.8 - 4.4 thou/uL    Monocytes Absolute 0.6 0.0 - 0.9 thou/uL    Eosinophils Absolute 0.2 0.0 - 0.4 thou/uL    Basophils Absolute 0.1 0.0 - 0.2 thou/uL   Urinalysis-UC if Indicated   Result Value Ref Range    Color, UA Yellow Colorless, Yellow, Straw, Light Yellow    Clarity, UA Clear Clear    Glucose, UA Negative Negative    Bilirubin, UA Negative Negative    Ketones, UA Negative Negative    Specific Gravity, UA <=1.005 1.005 - 1.030    Blood, UA Negative Negative    pH, UA 6.0 5.0 - 8.0    Protein, UA Negative Negative mg/dL    Urobilinogen, UA 0.2 E.U./dL 0.2 E.U./dL, 1.0 E.U./dL    Nitrite, UA Negative Negative    Leukocytes, UA Negative Negative    Narrative    Microscopic not indicated  UC not indicated       Sammi, recent labs are reviewed.  Your electrolytes, blood sugar, kidney functions, and all liver function tests, were normal.  Lipid levels looked excellent.  TSH, which measures thyroid hormone levels, is in a normal range . vitamin D was also excellent at 53.4.  I did screen you for hepatitis C and that test was negative for any previous hepatitis.  Hemoglobin is good at 15.2.  The urine analysis looked okay without any protein loss, blood loss, or signs of infection.  Overall, things look excellent.  I have asked my assistant to send these lab copies to , as per your wish.    Please call with questions or contact us using [employer].    Sincerely,        Electronically signed by Earl Altman MD

## 2021-06-20 NOTE — PROGRESS NOTES
Assessment and Plan:   67-year-old woman in for welcome to Medicare exam.    1. Routine general medical examination at a health care facility  He continues to do well.  She is rarely sick.  She does see an oncologist for breast cancer as well as a cardiologist for her coronary artery disease.  Also sees an endocrinologist 3 or 4 times per year for diabetes.    2. Age-related osteoporosis without current pathological fracture   takes Prolia every 6 months    3. Hypertension  Today's blood pressure 104/64    4. Atherosclerosis of native coronary artery of native heart without angina pectoris  Stent the left anterior artery several years ago.  No recurrent angina.    5. Mixed hyperlipidemia  Takes Lipitor 40 mg daily.    6. Controlled diabetes mellitus type 1 without complications (H)  Sees an endocrinologist.  She has a pump.  A1c's have been excellent.  No neuropathy.  Vision exam per ophthalmology has been excellent without diabetic changes.    7. Medication management  No trouble with current medications.    8. S/P coronary artery stent placement  Again, no angina.  No claudication.  No TIAs.    9. Need for vaccination  Due for a flu shot as well as Prevnar.        The patient's current medical problems were reviewed.    I have had an Advance Directives discussion with the patient.  The following health maintenance schedule was reviewed with the patient and provided in printed form in the after visit summary:   Health Maintenance   Topic Date Due     DIABETES HEMOGLOBIN A1C  04/10/2017     PNEUMOCOCCAL POLYSACCHARIDE VACCINE AGE 65 AND OVER  11/21/2017     PNEUMOCOCCAL CONJUGATE VACCINE FOR ADULTS (PCV13 OR PREVNAR)  11/21/2017     FALL RISK ASSESSMENT  11/21/2017     DIABETES FOLLOW-UP  11/25/2017     DIABETES FOOT EXAM  05/25/2018     DIABETES OPHTHALMOLOGY EXAM  05/25/2018     INFLUENZA VACCINE RULE BASED (1) 08/01/2018     DIABETES URINE MICROALBUMIN  09/27/2018     TD 18+ HE  10/22/2019     DXA SCAN   06/28/2020     ADVANCE DIRECTIVES DISCUSSED WITH PATIENT  10/06/2020     COLONOSCOPY  04/20/2025     TDAP ADULT ONE TIME DOSE  Completed     ZOSTER VACCINE  Completed        Subjective:   Chief Complaint: Sammi Guadalupe is an 67 y.o. female here for a Welcome to Medicare visit.   HPI: 67-year-old woman in for annual wellness visit.  Welcome to Medicare.  He is diabetic now for the past 12 years.  She continues to do well.  Sugars are excellent.  She is on insulin pump.  She has had a coronary artery stent placed 6 or 7 years ago.  No recurrent angina.  No TIAs.  No numbness.  No neuropathy.  No urinary incontinence.  No hematuria.  No change in bowel habits.  No dysphasia.  No neurologic or musculoskeletal complaints.    Review of Systems:    Please see above.  The rest of the review of systems are negative for all systems.    Patient Care Team:  Earl Altman MD as PCP - General (Internal Medicine)  Gagan Sanchez MD as Physician (Hematology and Oncology)  Andree Wray MD as Physician (General Surgery)  Yesenia Collier RN as Registered Nurse (Hematology and Oncology)     Patient Active Problem List   Diagnosis     Hypertension     Coronary Artery Disease     Malignant neoplasm of lower-outer quadrant of female breast (H)     Breast cancer, right breast (H)     Swelling of arm     DVT (deep venous thrombosis) (H)     Mixed hyperlipidemia     Age-related osteoporosis without current pathological fracture     Diabetes mellitus type I, controlled (H)     Past Medical History:   Diagnosis Date     ASHD (arteriosclerotic heart disease) 2012    CT scan, calcium score 428 lad artery deployment of a non-drug-eluting stent in the LAD     Breast cancer (H)     right     Coronary artery disease      Diabetes mellitus (H)     type 2     DVT (deep venous thrombosis) (H)      Family history of myocardial infarction      High cholesterol      Hyperlipidemia      Hypertension       Past Surgical History:    Procedure Laterality Date     BACK SURGERY       CARPAL TUNNEL RELEASE Bilateral      LAMINECTOMY AND MICRODISCECTOMY CERVICAL SPINE N/A      LAPAROSCOPIC HYSTERECTOMY N/A 12/12/2016    Procedure: ROBOTIC TOTAL LAPAROSCOPIC HYSTERECTOMY, BILATERAL SALPINGO-OOPHORECTOMY, CYSTOSCOPY;  Surgeon: Sohan Ramirez MD;  Location: [facility];  Service:      MASTECTOMY Bilateral       Family History   Problem Relation Age of Onset     Cancer Mother      Heart attack Mother      Cancer Father      Heart disease Father      CABG Father      Heart disease Sister       Social History     Social History     Marital status:      Spouse name: N/A     Number of children: N/A     Years of education: N/A     Occupational History     Not on file.     Social History Main Topics     Smoking status: Former Smoker     Packs/day: 0.50     Years: 10.00     Smokeless tobacco: Never Used     Alcohol use 3.0 oz/week     3 Glasses of wine, 2 Cans of beer per week     Drug use: No     Sexual activity: No     Other Topics Concern     Not on file     Social History Narrative       Current Outpatient Prescriptions   Medication Sig Dispense Refill     anastrozole (ARIMIDEX) 1 mg tablet        aspirin 81 MG tablet Take 81 mg by mouth daily.       atorvastatin (LIPITOR) 40 MG tablet TAKE 1 TABLET AT BEDTIME 90 tablet 2     b complex vitamins tablet Take 1 tablet by mouth daily.       blood glucose test strips Dispense item covered by pt ins. Test 6 times a day       calcium carbonate (OS-PATTI) 600 mg (1,500 mg) tablet Take 1,200 mg by mouth bedtime. Plus magnesium        cholecalciferol, vitamin D3, (VITAMIN D3) 2,000 unit cap Take 2,000 Units by mouth bedtime.        insulin lispro (HUMALOG) 100 unit/mL injection 20 Units daily.       irbesartan-hydrochlorothiazide (AVALIDE) 150-12.5 mg per tablet TAKE 1 TABLET DAILY 90 tablet 2     lysine 500 mg Tab Take 500 mg by mouth 2 (two) times a day.       multivitamin (MULTIVITAMIN) per tablet  Take 1 tablet by mouth daily.       OMEGA-3/DHA/EPA/FISH OIL (FISH OIL-OMEGA-3 FATTY ACIDS) 300-1,000 mg capsule Take 500 mg by mouth 2 (two) times a day.        risedronate (ACTONEL) 35 MG tablet TAKE 1 TABLET EVERY 7 DAYS WITH WATER ON AN EMPTY STOMACH, NOTHING BY MOUTH OR LIE DOWN FOR NEXT 30 MINUTES 4 tablet 2     No current facility-administered medications for this visit.       Objective:   Vital Signs: There were no vitals taken for this visit.     EKG:      VisionScreening:  No exam data present     PHYSICAL EXAM  Blood pressure 104/64.  Pulse 66 and regular.  EYES: Eyelids, conjunctiva, and sclera were normal. Pupils were normal. Cornea, iris, and lens were normal bilaterally.  No diabetic changes.  HEAD, EARS, NOSE, MOUTH, AND THROAT: Head and face were normal. Hearing was normal to voice and the ears were normal to external exam. Nose appearance was normal and there was no discharge. Oropharynx was normal.  NECK: Neck appearance was normal. There were no neck masses and the thyroid was not enlarged.  No bruits in the neck.  RESPIRATORY: Breathing pattern was normal and the chest moved symmetrically.  Percussion/auscultatory percussion was normal.  Lung sounds were normal and there were no abnormal sounds.  CARDIOVASCULAR: Heart rate and rhythm were normal.  S1 and S2 were normal and there were no extra sounds or murmurs. Peripheral pulses in arms and legs were normal.  Jugular venous pressure was normal.  There was no peripheral edema.  GASTROINTESTINAL: The abdomen was normal in contour.  Bowel sounds were present.  Percussion detected no organ enlargement or tenderness.  Palpation detected no tenderness, mass, or enlarged organs.   MUSCULOSKELETAL: Skeletal configuration was normal and muscle mass was normal for age. Joint appearance was overall normal.  LYMPHATIC: There were no enlarged nodes.  SKIN/HAIR/NAILS: Skin color was normal.  There were no skin lesions.  Hair and nails were  normal.  NEUROLOGIC: The patient was alert and oriented to person, place, time, and circumstance. Speech was normal. Cranial nerves were normal. Motor strength was normal for age. The patient was normally coordinated.  PSYCHIATRIC:  Mood and affect were normal and the patient had normal recent and remote memory. The patient's judgment and insight were normal.    ADDITIONAL VITAL SIGNS: 68  CHEST WALL/BREASTS: Bilateral simple mastectomies in the past.  History of breast cancer.  RECTAL: Not checked  GENITAL/URINARY: Total hysterectomy in the past.        No flowsheet data found.  A Mini Cog score of 0-2 suggests the possibility of dementia, score of 3-5 suggests no dementia    Identified Health Risks:

## 2021-06-21 NOTE — PROGRESS NOTES
"     S: Pt. seen in [place] office for F/D of [place] FO. Female, 5'3\", 100 lbs. Dr. Welsh RX: Custom FO, DX: Plantar flexed Metatarsal.   O: Condition unchanged since initial evaluation. Pt. presently wearing Custom FO. Pt. has had foot pain before she received FO last year. No surgeries to date.   A: Pain 2/10. Pes Cavus feet. Hind and fore foot neutral. ROM within norm. 1st ray flexible. Heel rise inverted. Callus formation Right 4th MTH plantar surface.   P: Today I F/D Custom [place] FO. FO lift and support Medial long. arches, met pads offload MTH's bilaterally Pt. stated that FO felt good. Donning doffing wear and care along with break in schedule given. Pt. to be seen as needed.     [place]  "

## 2021-06-21 NOTE — LETTER
Letter by Yasmine Pierre RN at      Author: Yasmine Pierre RN Service: -- Author Type: --    Filed:  Encounter Date: 12/10/2020 Status: (Other)         Sammi Guadalupe  [address] [place] [address]             December 10, 2020         Dear Ms. Guadalupe,    Below is the narrative summary from your recent stress test that we discussed via phone:    Resulted Orders   NM Exercise Stress Test   Narrative   ?  A prior study was conducted on 11/7/2018.  This study showed inferior   infarction rather than ischemia.  ?  The nuclear stress test is abnormal.  ?  The patient is at a low risk of future cardiac ischemic events.  ?  There is a small area of infarction in the inferior segment(s) of the   left ventricle.  No evidence of inducible myocardial ischemia on perfusion   imaging.  Cannot exclude diaphragmatic attenuation.  ?  The left ventricular ejection fraction at stress is greater than 70%.  ?  The patient's exercise capacity is above average.  No symptoms reported   with the stress test.  ?  The stress electrocardiogram was abnormal showing horizontal   depressions and was positive for inducible ischemia.           Please call with questions or contact us using [employer].    Sincerely,        Electronically signed by Yasmine Pierre RN

## 2021-06-21 NOTE — LETTER
"Letter by Earl Altman MD at      Author: Earl Altman MD Service: -- Author Type: --    Filed:  Encounter Date: 10/19/2020 Status: (Other)         Sammi Guadalupe  [address] [place] [address]             October 19, 2020         Dear Ms. Guadalupe,    Below are the results from your recent visit:    Resulted Orders   Glycosylated Hemoglobin A1c   Result Value Ref Range    Hemoglobin A1c 5.9 (H) <=5.6 %      Comment:      Normal <5.7% Prediabete 5.7-6.4% Diabletes 6.5% or higher - adopted from ADA consensus guidelines   Comprehensive Metabolic Panel   Result Value Ref Range    Sodium 140 136 - 145 mmol/L    Potassium 4.4 3.5 - 5.0 mmol/L    Chloride 100 98 - 107 mmol/L    CO2 30 22 - 31 mmol/L    Anion Gap, Calculation 10 5 - 18 mmol/L    Glucose 96 70 - 125 mg/dL    BUN 17 8 - 22 mg/dL    Creatinine 0.69 0.60 - 1.10 mg/dL    GFR MDRD Af Amer >60 >60 mL/min/1.73m2    GFR MDRD Non Af Amer >60 >60 mL/min/1.73m2    Bilirubin, Total 1.1 (H) 0.0 - 1.0 mg/dL    Calcium 9.3 8.5 - 10.5 mg/dL    Protein, Total 6.9 6.0 - 8.0 g/dL    Albumin 4.4 3.5 - 5.0 g/dL    Alkaline Phosphatase 52 45 - 120 U/L    AST 40 0 - 40 U/L    ALT 24 0 - 45 U/L    Narrative    Fasting Glucose reference range is 70-99 mg/dL per  American Diabetes Association (ADA) guidelines.   Lipid Cascade   Result Value Ref Range    Cholesterol 142 <=199 mg/dL    Triglycerides 37 <=149 mg/dL    HDL Cholesterol 78 >=50 mg/dL    LDL Calculated 57 <=129 mg/dL    Patient Fasting > 8hrs? Yes    Microalbumin, Random Urine   Result Value Ref Range    Microalbumin, Random Urine <0.50 0.00 - 1.99 mg/dL    Creatinine, Urine 41.9 mg/dL    Microalbumin/Creatinine Ratio Random Urine        Comment:      \"Unable to calculate: Creatinine and/or Microalbumin value below detectable level\"    Narrative    Microalbumin, Random Urine  <2.0 mg/dL . . . . . . . . Normal  3.0-30.0 mg/dL . . . . . . Microalbuminuria  >30.0 mg/dL . . . . . .  . Clinical " Proteinuria    Microalbumin/Creatinine Ratio, Random Urine  <20 mg/g . . . . .. . . . Normal   mg/g . . . . . . . Microalbuminuria  >300 mg/g . . . . . . . . Clinical Proteinuria       Urinalysis-UC if Indicated   Result Value Ref Range    Color, UA Yellow Colorless, Yellow, Straw, Light Yellow    Clarity, UA Clear Clear    Glucose, UA Negative Negative    Bilirubin, UA Negative Negative    Ketones, UA Trace (!) Negative    Specific Gravity, UA 1.015 1.005 - 1.030    Blood, UA Negative Negative    pH, UA 5.5 5.0 - 8.0    Protein, UA Negative Negative mg/dL    Urobilinogen, UA 0.2 E.U./dL 0.2 E.U./dL, 1.0 E.U./dL    Nitrite, UA Negative Negative    Leukocytes, UA Negative Negative    Narrative    Microscopic not indicated  UC not indicated   HM1 (CBC with Diff)   Result Value Ref Range    WBC 6.8 4.0 - 11.0 thou/uL    RBC 4.70 3.80 - 5.40 mill/uL    Hemoglobin 15.2 12.0 - 16.0 g/dL    Hematocrit 45.1 35.0 - 47.0 %    MCV 96 80 - 100 fL    MCH 32.3 27.0 - 34.0 pg    MCHC 33.7 32.0 - 36.0 g/dL    RDW 11.7 11.0 - 14.5 %    Platelets 276 140 - 440 thou/uL    MPV 6.4 (L) 7.0 - 10.0 fL    Neutrophils % 49 (L) 50 - 70 %    Lymphocytes % 42 (H) 20 - 40 %    Monocytes % 7 2 - 10 %    Eosinophils % 1 0 - 6 %    Basophils % 1 0 - 2 %    Neutrophils Absolute 3.3 2.0 - 7.7 thou/uL    Lymphocytes Absolute 2.9 0.8 - 4.4 thou/uL    Monocytes Absolute 0.5 0.0 - 0.9 thou/uL    Eosinophils Absolute 0.1 0.0 - 0.4 thou/uL    Basophils Absolute 0.1 0.0 - 0.2 thou/uL       Sammi, recent labs are reviewed.  Hemoglobin A1c, is fabulous at 5.9.  Electrolytes, blood sugar, kidney function, and liver function tests, are all normal.  Bilirubin is elevated a little but that is because of your fasting state, and is not significant.  Lipids continue to look excellent.  Urine for microalbumin is negative without any significant protein losses.  The regular urine analysis looked okay without blood loss or infection.  Hemoglobin, white blood  count, and platelets, are all normal.  In summary, labs look very good !  please call with questions or contact us using [employer].    Sincerely,        Electronically signed by Earl Altman MD

## 2021-06-21 NOTE — PROGRESS NOTES
"S: Pt. seen in [employer] office. Female, 5'3\", 100 lbs. Dr. Welsh RX: Custom FO, DX: Plantar flexed Metatarsal.   O: Pt. presently wearing Custom FO. Pt. has had foot pain before she received FO last year. No surgeries to date.   A: Pain 2/10. Pes Cavus feet. Hind and fore foot neutral.  ROM within norm. 1st ray flexible. Heel rise inverted. Callus formation Right 4th MTH plantar surface.   P: Today I C/M with bio foam for Custom [place] FO. FO to lift and support Medial long. arches, met pads to offload MTH's bilaterally.  Pt. to be seen for F/D.     [place]  "

## 2021-06-23 NOTE — PROGRESS NOTES
[facility] Hematology and Oncology Progress Note    Patient: Sammi Guadalupe  MRN: [id number]  Date of Service:         Reason for Visit    Chief Complaint   Patient presents with     HE Cancer     Malignant neoplasm of lower-outer quadrant of female breast        Assessment and Plan    T1 N0 M0, stage I right breast cancer, HER-2 positive by RT-PCR and fish, Oncotype score of 30, tumor ER/HI positive, diagnosis October 2013  Myalgias  Osteopenia    Patient continues to do well with no evidence of recurrence of breast cancer.  She will continue anastrozole and calcium with vitamin D.  Discussed that we will continue anastrozole for a total of 5 years making a total of 7 years of adjuvant hormonal therapy and so she will continue on treatment until May 2021.    Myalgias have resolved.  She did switch to Prolia and will get another dose today.  We will plan to continue this while she is on anastrozole.  We will recheck bone density next year.    Plan: As above      Measurable disease: None postoperatively        Current therapy: Anastrozole 1 mg by mouth daily started in April 2016 Tamoxifen 20 mg daily since May 2014        TREATMENT HISTORY: 1 year of Herceptin completed in February 2015    Taxotere carboplatin and Herceptin for 5 cycles completed in April 2014    One cycle of Taxotere and cyclophosphamide started January 30, 2014    Bilateral mastectomy in October 2013       Malignant neoplasm of lower-outer quadrant of female breast    Staging form: Breast, AJCC 7th Edition      Clinical: Stage IA (T1, N0, cM0) - Signed by Gagan Sanchez MD on 6/3/2014    ECOG Performance   ECOG Performance Status: 1    Distress Assessment  Distress Assessment Score: No distress    Pain           Problem List    1. Malignant neoplasm of lower-outer quadrant of both breasts in female, estrogen receptor positive (H)          CC: Earl Altman,  MD    ______________________________________________________________________________    History of Present Illness    Ms. Sammi Guadalupe is here for reevaluation.  She was seen 6 months ago.  We did switch her to Prolia which she has tolerated fine.  She continues on anastrozole with no side effects.  No headaches or dizziness.  No shortness of breath or cough.  No new bone or abdominal pain.  ECOG status is 0.  She is enjoying her correction.    Pain Status  Currently in Pain: No/denies    Review of Systems    Constitutional  Constitutional (WDL): Exceptions to WDL  Neurosensory  Neurosensory (WDL): Exceptions to WDL  Peripheral Sensory Neuropathy: Asymptomatic, loss of deep tendon reflexes or paresthesia(Fingers and toes. Numbness. Fingers are a bit better.)  Cardiovascular  Cardiovascular (WDL): All cardiovascular elements are within defined limits  Pulmonary  Respiratory (WDL): Within Defined Limits  Gastrointestinal  Gastrointestinal (WDL): All gastrointestinal elements are within defined limits  Genitourinary  Genitourinary (WDL): All genitourinary elements are within defined limits  Integumentary  Integumentary (WDL): All integumentary elements are within defined limits  Patient Coping  Patient Coping: Accepting  Distress Assessment  Distress Assessment Score: No distress  Accompanied by  Accompanied by: Alone    Past History  Past Medical History:   Diagnosis Date     ASHD (arteriosclerotic heart disease) 2012    CT scan, calcium score 428 lad artery deployment of a non-drug-eluting stent in the LAD     Breast cancer (H)     right     Coronary artery disease      Diabetes mellitus (H)     type 2     DVT (deep venous thrombosis) (H)      Family history of myocardial infarction      High cholesterol      Hyperlipidemia      Hypertension          Past Surgical History:   Procedure Laterality Date     BACK SURGERY       CARPAL TUNNEL RELEASE Bilateral      LAMINECTOMY AND MICRODISCECTOMY CERVICAL SPINE N/A       LAPAROSCOPIC HYSTERECTOMY N/A 12/12/2016    Procedure: ROBOTIC TOTAL LAPAROSCOPIC HYSTERECTOMY, BILATERAL SALPINGO-OOPHORECTOMY, CYSTOSCOPY;  Surgeon: Sohan Ramirez MD;  Location: [facility];  Service:      MASTECTOMY Bilateral        Physical Exam    Recent Vitals 1/10/2019   Height -   Weight 102 lbs   BSA (m2) 1.43 m2   /63   Pulse 70   Temp 97.6   Temp src 1   SpO2 100   Some recent data might be hidden       GENERAL: Alert and oriented. Seated comfortably. In no distress.    HEAD: Atraumatic and normocephalic.  Has a full head of hair.    EYES: MINH, EOMI.  No pallor.  No icterus.    Oral cavity: no mucosal lesion or tonsillar enlargement.    NECK: supple. JVP normal.  No thyroid enlargement.    LYMPH NODES: No palpable, cervical, axillary or inguinal lymphadenopathy.    CHEST: clear to auscultation bilaterally.  Resonant to percussion throughout bilaterally.  Symmetrical breath movements bilaterally.    CVS: S1 and S2 are heard. Regular rate and rhythm.  No murmur or gallop or rub heard.    Breasts: She is status post bilateral mastectomies.  No locally recurrent disease.  No axillary adenopathy.    ABDOMEN: Soft. Not tender. Not distended.  No palpable hepatomegaly or splenomegaly.  No other mass palpable.  Bowel sounds heard.    EXTREMITIES: Warm.  No peripheral edema.  No tenderness to palpation over the upper arms    SKIN: no rash, or bruising or purpura.        Lab Results    Recent Results (from the past 168 hour(s))   Comprehensive Metabolic Panel   Result Value Ref Range    Sodium 138 136 - 145 mmol/L    Potassium 3.6 3.5 - 5.0 mmol/L    Chloride 98 98 - 107 mmol/L    CO2 31 22 - 31 mmol/L    Anion Gap, Calculation 9 5 - 18 mmol/L    Glucose 96 70 - 125 mg/dL    BUN 17 8 - 22 mg/dL    Creatinine 0.73 0.60 - 1.10 mg/dL    GFR MDRD Af Amer >60 >60 mL/min/1.73m2    GFR MDRD Non Af Amer >60 >60 mL/min/1.73m2    Bilirubin, Total 0.9 0.0 - 1.0 mg/dL    Calcium 9.8 8.5 - 10.5 mg/dL     Protein, Total 7.0 6.0 - 8.0 g/dL    Albumin 4.3 3.5 - 5.0 g/dL    Alkaline Phosphatase 62 45 - 120 U/L    AST 37 0 - 40 U/L    ALT 28 0 - 45 U/L       Imaging    No results found.      Signed by: Gagan Sanchez MD

## 2021-06-23 NOTE — TELEPHONE ENCOUNTER
Refills Approved x 2    Rx renewed per Medication Renewal Policy. Medication was last renewed on Atorvastatin: 2/27/2018 with 2 refills,  Irbesartan HCTZ: 12/6/2017 with 2 refills.   Last office visit: 1/31/2019 with Dr WEN Sethi @ [facility]    Sulema Chauhan, [facility] Triage/Med Refill 2/2/2019     Requested Prescriptions   Pending Prescriptions Disp Refills     atorvastatin (LIPITOR) 40 MG tablet [Pharmacy Med Name: ATORVASTATIN 40MG TABLETS] 90 tablet 0     Sig: TAKE 1 TABLET BY MOUTH EVERY NIGHT AT BEDTIME    Statins Refill Protocol (Hmg CoA Reductase Inhibitors) Passed - 1/31/2019  5:23 PM       Passed - PCP or prescribing provider visit in past 12 months     Last office visit with prescriber/PCP: 5/31/2017 Earl Altman MD OR same dept: 1/31/2019 Rolf Sethi MD OR same specialty: 1/31/2019 Rolf Sethi MD  Last physical: 9/24/2018 Last MTM visit: Visit date not found   Next visit within 3 mo: Visit date not found  Next physical within 3 mo: Visit date not found  Prescriber OR PCP: Earl Altman MD  Last diagnosis associated with med order: 1. Hypertension  - irbesartan-hydrochlorothiazide (AVALIDE) 150-12.5 mg per tablet [Pharmacy Med Name: IRBESARTAN/HCTZ 150-12.5MG TABLETS]; TAKE 1 TABLET BY MOUTH DAILY  Dispense: 90 tablet; Refill: 0    If protocol passes may refill for 12 months if within 3 months of last provider visit (or a total of 15 months).             irbesartan-hydrochlorothiazide (AVALIDE) 150-12.5 mg per tablet [Pharmacy Med Name: IRBESARTAN/HCTZ 150-12.5MG TABLETS] 90 tablet 0     Sig: TAKE 1 TABLET BY MOUTH DAILY    Diuretics/Combination Diuretics Refill Protocol  Passed - 1/31/2019  5:23 PM       Passed - Visit with PCP or prescribing provider visit in past 12 months    Last office visit with prescriber/PCP: 5/31/2017 Earl Altman MD OR same dept: 1/31/2019 Rolf Sethi MD OR same specialty: 1/31/2019 Rolf Sethi MD  Last physical: 9/24/2018 Last  MTM visit: Visit date not found   Next visit within 3 mo: Visit date not found  Next physical within 3 mo: Visit date not found  Prescriber OR PCP: Earl Altman MD  Last diagnosis associated with med order: 1. Hypertension  - irbesartan-hydrochlorothiazide (AVALIDE) 150-12.5 mg per tablet [Pharmacy Med Name: IRBESARTAN/HCTZ 150-12.5MG TABLETS]; TAKE 1 TABLET BY MOUTH DAILY  Dispense: 90 tablet; Refill: 0    If protocol passes may refill for 12 months if within 3 months of last provider visit (or a total of 15 months).            Passed - Serum Potassium in past 12 months     Lab Results   Component Value Date    Potassium 3.6 01/10/2019            Passed - Serum Sodium in past 12 months     Lab Results   Component Value Date    Sodium 138 01/10/2019            Passed - Blood pressure on file in past 12 months    BP Readings from Last 1 Encounters:   01/31/19 120/62            Passed - Serum Creatinine in past 12 months     Creatinine   Date Value Ref Range Status   01/10/2019 0.73 0.60 - 1.10 mg/dL Final

## 2021-06-23 NOTE — PROGRESS NOTES
Pt here today after MD visit for prolia injection. It was administered in her left upper arm SQ. Tolerated well. Site covered with bandaid. Pt left clinic independent and ambulatory.

## 2021-06-25 NOTE — PROGRESS NOTES
Progress Notes by Rolf Gipson MD at 11/9/2017  3:00 PM     Author: Rolf Gipson MD Service: -- Author Type: Physician    Filed: 11/9/2017  3:27 PM Encounter Date: 11/9/2017 Status: Signed    : Rolf Gipson MD (Physician)           Click to link to [facility] NOTE    Thank you, Dr. Altman, for asking us to see Sammi Guadalupe at the [facility].      Assessment/Recommendations   Patient with known coronary artery disease, abnormal stress nuclear but it is stable and she is asymptomatic.  We talked about stable and unstable plaque and the fact that there is no data to tell us that stents would improve her longevity.    Her lipids are well-controlled, she takes an antiplatelet agent, and she exercises on a regular basis.  Her blood pressure is at goal.    I am not changing of her medications but encouraged her to maintain an active lifestyle.    We will repeat her exercise test in a year and we will see her in follow-up thereafter.    Thank you for allowing us to participate in her care.         History of Present Illness    Ms. Sammi Guadalupe is a 66 y.o. female with known coronary artery disease.  She had a stent placed in her LAD in 2012.  She had a nuclear exercise test before surgery last year and showed some inferior ischemia.  She also had normal left ventricular systolic function at that time.  We repeated the stress test this year because of her diabetes and potential lack of a warning system.  The study looks exactly the same with the same abnormality and normal left ventricular systolic function.  She was able to go over 11 minutes on a Derrell protocol.    She has not had any chest discomfort with the exception of some heartburn.  She denies orthopnea, paroxysmal nocturnal dyspnea, peripheral edema, syncope or near syncopal episodes.  She is exercising on a regular basis with a walk jog routine and does some strengthening  exercises as well.             Physical Examination Review of Systems   Vitals:    11/09/17 1453   BP: 112/64   Pulse: 68   Resp: 12     Body mass index is 18.6 kg/(m^2).  Wt Readings from Last 3 Encounters:   11/09/17 105 lb (47.6 kg)   09/27/17 105 lb (47.6 kg)   08/02/17 105 lb (47.6 kg)     General Appearance:   Alert, cooperative and in no acute distress.   ENT/Mouth: Oral mucuos membranes pink and moist .      EYES:  No scleral icterus. No Xanthelasma.    Neck: JVP normal. No Hepatojugular reflux. Thyroid not visualized   Chest/Lungs:   Lungs are clear to auscultation, equal chest wall expansion    Cardiovascular:   S1, S2 without murmur ,clicks or rubs. Brachial, radial and posterior tibial pulses are intact and symetric. No carotid bruits noted   Abdomen:  Nontender. BS+. No bruits.      Extremities: No cyanosis, clubbing or edema   Skin: no xanthelasma, warm.    Psych: Appropriate affect.   Neurologic: normal gait, normal  bilateral, no tremors        General: WNL  Eyes: WNL  Ears/Nose/Throat: WNL  Lungs: WNL  Heart: WNL  Stomach: WNL  Bladder: WNL  Muscle/Joints: WNL  Skin: WNL  Nervous System: WNL  Mental Health: WNL     Blood: WNL     Medical History  Surgical History Family History Social History   Past Medical History:   Diagnosis Date   ? ASHD (arteriosclerotic heart disease) 2012    CT scan, calcium score 428 lad artery deployment of a non-drug-eluting stent in the LAD   ? Breast cancer     right   ? Coronary artery disease    ? Diabetes mellitus     type 2   ? DVT (deep venous thrombosis)    ? Family history of myocardial infarction    ? High cholesterol    ? Hyperlipidemia    ? Hypertension     Past Surgical History:   Procedure Laterality Date   ? BACK SURGERY     ? CARPAL TUNNEL RELEASE Bilateral    ? LAMINECTOMY AND MICRODISCECTOMY CERVICAL SPINE N/A    ? LAPAROSCOPIC HYSTERECTOMY N/A 12/12/2016    Procedure: ROBOTIC TOTAL LAPAROSCOPIC HYSTERECTOMY, BILATERAL SALPINGO-OOPHORECTOMY,  CYSTOSCOPY;  Surgeon: Sohan Ramirez MD;  Location: [facility] OR;  Service:    ? MASTECTOMY Bilateral     Family History   Problem Relation Age of Onset   ? Cancer Mother    ? Heart attack Mother    ? Cancer Father    ? Heart disease Father    ? CABG Father    ? Heart disease Sister     Social History     Social History   ? Marital status:      Spouse name: N/A   ? Number of children: N/A   ? Years of education: N/A     Occupational History   ? Not on file.     Social History Main Topics   ? Smoking status: Former Smoker     Packs/day: 0.50     Years: 10.00   ? Smokeless tobacco: Never Used   ? Alcohol use 3.0 oz/week     3 Glasses of wine, 2 Cans of beer per week   ? Drug use: No   ? Sexual activity: No     Other Topics Concern   ? Not on file     Social History Narrative          Medications  Allergies   Current Outpatient Prescriptions   Medication Sig Dispense Refill   ? anastrozole (ARIMIDEX) 1 mg tablet      ? aspirin 81 MG tablet Take 81 mg by mouth daily.     ? atorvastatin (LIPITOR) 40 MG tablet Take 40 mg by mouth bedtime.     ? b complex vitamins tablet Take 1 tablet by mouth daily.     ? blood glucose test strips Dispense item covered by pt ins. Test 6 times a day     ? calcium carbonate (OS-PATTI) 600 mg (1,500 mg) tablet Take 1,200 mg by mouth bedtime. Plus magnesium      ? cholecalciferol, vitamin D3, (VITAMIN D3) 2,000 unit cap Take 2,000 Units by mouth bedtime.      ? insulin lispro (HUMALOG) 100 unit/mL injection 20 Units daily.     ? irbesartan-hydrochlorothiazide (AVALIDE) 150-12.5 mg per tablet Take 1 tablet by mouth.     ? lysine 500 mg Tab Take 500 mg by mouth 2 (two) times a day.     ? multivitamin (MULTIVITAMIN) per tablet Take 1 tablet by mouth daily.     ? OMEGA-3/DHA/EPA/FISH OIL (FISH OIL-OMEGA-3 FATTY ACIDS) 300-1,000 mg capsule Take 500 mg by mouth 2 (two) times a day.      ? risedronate (ACTONEL) 35 MG tablet Take 1 tablet (35 mg total) by mouth every 7 days. with water on  empty stomach, nothing by mouth or lie down for next 30 minutes. 4 tablet 3     No current facility-administered medications for this visit.       Allergies   Allergen Reactions   ? Nitroglycerin Other (See Comments)     Blood Pressure drops significantly.   ? Sulfa (Sulfonamide Antibiotics) Dizziness     Dizziness and doesn't feel normal.         Lab Results    Chemistry/lipid CBC Cardiac Enzymes/BNP/TSH/INR   Lab Results   Component Value Date    CHOL 143 09/27/2017    HDL 80 09/27/2017    LDLCALC 57 09/27/2017    TRIG 32 09/27/2017    CREATININE 0.74 09/27/2017    BUN 17 09/27/2017    K 4.5 09/27/2017     09/27/2017     09/27/2017    CO2 26 09/27/2017    Lab Results   Component Value Date    WBC 7.3 09/27/2017    HGB 14.8 09/27/2017    HCT 43.3 09/27/2017    MCV 95 09/27/2017     09/27/2017    Lab Results   Component Value Date    TROPONINI <0.01 11/05/2012    BNP 29 10/03/2014    TSH 2.16 10/10/2016    INR 2.31 (H) 02/20/2015

## 2021-06-26 DIAGNOSIS — M81.0 AGE-RELATED OSTEOPOROSIS WITHOUT CURRENT PATHOLOGICAL FRACTURE: ICD-10-CM

## 2021-06-26 DIAGNOSIS — T50.995S ADVERSE EFFECT OF OTHER DRUGS, MEDICAMENTS AND BIOLOGICAL SUBSTANCES, SEQUELA: Primary | ICD-10-CM

## 2021-06-26 RX ORDER — ALBUTEROL SULFATE 90 UG/1
1-2 AEROSOL, METERED RESPIRATORY (INHALATION)
Status: CANCELLED
Start: 2021-07-20

## 2021-06-26 RX ORDER — DIPHENHYDRAMINE HYDROCHLORIDE 50 MG/ML
50 INJECTION INTRAMUSCULAR; INTRAVENOUS
Status: CANCELLED
Start: 2021-07-20

## 2021-06-26 RX ORDER — NALOXONE HYDROCHLORIDE 0.4 MG/ML
0.2 INJECTION, SOLUTION INTRAMUSCULAR; INTRAVENOUS; SUBCUTANEOUS
Status: CANCELLED | OUTPATIENT
Start: 2021-07-20

## 2021-06-26 RX ORDER — ALBUTEROL SULFATE 0.83 MG/ML
2.5 SOLUTION RESPIRATORY (INHALATION)
Status: CANCELLED | OUTPATIENT
Start: 2021-07-20

## 2021-06-26 RX ORDER — METHYLPREDNISOLONE SODIUM SUCCINATE 125 MG/2ML
125 INJECTION, POWDER, LYOPHILIZED, FOR SOLUTION INTRAMUSCULAR; INTRAVENOUS
Status: CANCELLED
Start: 2021-07-20

## 2021-06-26 RX ORDER — EPINEPHRINE 1 MG/ML
0.3 INJECTION, SOLUTION, CONCENTRATE INTRAVENOUS EVERY 5 MIN PRN
Status: CANCELLED | OUTPATIENT
Start: 2021-07-20

## 2021-06-26 RX ORDER — MEPERIDINE HYDROCHLORIDE 25 MG/ML
25 INJECTION INTRAMUSCULAR; INTRAVENOUS; SUBCUTANEOUS EVERY 30 MIN PRN
Status: CANCELLED | OUTPATIENT
Start: 2021-07-20

## 2021-06-26 NOTE — PROGRESS NOTES
Progress Notes by Rolf Gipson MD at 11/14/2018  2:10 PM     Author: Rolf Gipson MD Service: -- Author Type: Physician    Filed: 11/14/2018  2:55 PM Encounter Date: 11/14/2018 Status: Signed    : Rolf Gipson MD (Physician)                      Click to link to [facility] NOTE    Thank you, Dr. Altman, for asking us to see Sammi Guadalupe at the [facility].      Assessment/Recommendations   Patient with known coronary artery disease who has chest discomfort which is atypical but previously had some small amount of myocardial ischemia on nuclear studies.  We talked about stable versus unstable plaque.  She does have concerns about her cardiac status had I think it would be quite reasonable to repeat an imaging stress test and I recommended a stress echocardiogram.  She had an echocardiac M in 2014 and the images were good.  I think this would be a good test without any radiation.    I am not changing her medications but because her lipid panel is changing, I would recheck in 6 months and we would consider going up to 80 of Lipitor if it continues to rise.     Thank you for allowing us to participate in her care.         History of Present Illness     Ms. Sammi Guadalupe is a 67 y.o. female with known coronary artery disease with previous percutaneous intervention.  In the last 2 years we have done a nuclear exercise test on her which showed a small area of each.  We have followed her.  She does get chest discomfort which she relates to her osteoporosis medications.  It can come on with exercise but more typically she will get some type of discomfort after she quits exercise and she is resting.  He will last about 10 minutes.  Prior to her percutaneous intervention she also had discomfort  which would come on after exercise.  She has not had orthopnea, paroxysmal nocturnal dyspnea, peripheral edema, syncope or near syncopal episodes.   She exercises on a regular basis and since she retired in the spring 2018 she is exercise more and eats at home more which is a better diet.  Her LDL cholesterol is gone up to 68.         Physical Examination Review of Systems   Vitals:    11/14/18 1405   BP: 128/64   Pulse: 72   Resp: 14     Body mass index is 17.89 kg/m .  Wt Readings from Last 3 Encounters:   11/14/18 101 lb (45.8 kg)   09/24/18 (!) 99 lb (44.9 kg)   07/09/18 101 lb 8 oz (46 kg)     General Appearance:   Alert, cooperative and in no acute distress.   ENT/Mouth: Oral mucuos membranes pink and moist .      EYES:  No scleral icterus. No Xanthelasma.    Neck: JVP normal. No Hepatojugular reflux. Thyroid not visualized   Chest/Lungs:   Lungs are clear to auscultation, equal chest wall expansion    Cardiovascular:   S1, S2 without murmur ,clicks or rubs. Brachial, radial and posterior tibial pulses are intact and symetric. No carotid bruits noted   Abdomen:  Nontender. BS+.       Extremities: No cyanosis, clubbing or edema   Skin: no xanthelasma, warm.    Psych: Appropriate affect.   Neurologic: normal gait, normal  bilateral, no tremors        General: WNL  Eyes: WNL  Ears/Nose/Throat: WNL  Lungs: WNL  Heart: WNL  Stomach: WNL  Bladder: WNL  Muscle/Joints: WNL  Skin: WNL  Nervous System: WNL  Mental Health: WNL     Blood: WNL     Medical History  Surgical History Family History Social History   Past Medical History:   Diagnosis Date   ? ASHD (arteriosclerotic heart disease) 2012    CT scan, calcium score 428 lad artery deployment of a non-drug-eluting stent in the LAD   ? Breast cancer (H)     right   ? Coronary artery disease    ? Diabetes mellitus (H)     type 2   ? DVT (deep venous thrombosis) (H)    ? Family history of myocardial infarction    ? High cholesterol    ? Hyperlipidemia    ? Hypertension     Past Surgical History:   Procedure Laterality Date   ? BACK SURGERY     ? CARPAL TUNNEL RELEASE Bilateral    ? LAMINECTOMY AND MICRODISCECTOMY  CERVICAL SPINE N/A    ? LAPAROSCOPIC HYSTERECTOMY N/A 12/12/2016    Procedure: ROBOTIC TOTAL LAPAROSCOPIC HYSTERECTOMY, BILATERAL SALPINGO-OOPHORECTOMY, CYSTOSCOPY;  Surgeon: Sohan Ramirez MD;  Location: [facility] OR;  Service:    ? MASTECTOMY Bilateral     Family History   Problem Relation Age of Onset   ? Cancer Mother    ? Heart attack Mother    ? Cancer Father    ? Heart disease Father    ? CABG Father    ? Heart disease Sister     Social History     Socioeconomic History   ? Marital status: Single     Spouse name: Not on file   ? Number of children: Not on file   ? Years of education: Not on file   ? Highest education level: Not on file   Social Needs   ? Financial resource strain: Not on file   ? Food insecurity - worry: Not on file   ? Food insecurity - inability: Not on file   ? Transportation needs - medical: Not on file   ? Transportation needs - non-medical: Not on file   Occupational History   ? Not on file   Tobacco Use   ? Smoking status: Former Smoker     Packs/day: 0.50     Years: 10.00     Pack years: 5.00   ? Smokeless tobacco: Never Used   Substance and Sexual Activity   ? Alcohol use: Yes     Alcohol/week: 3.0 oz     Types: 3 Glasses of wine, 2 Cans of beer per week   ? Drug use: No   ? Sexual activity: No   Other Topics Concern   ? Not on file   Social History Narrative   ? Not on file          Medications  Allergies   Current Outpatient Medications   Medication Sig Dispense Refill   ? anastrozole (ARIMIDEX) 1 mg tablet      ? aspirin 81 MG tablet Take 81 mg by mouth daily.     ? atorvastatin (LIPITOR) 40 MG tablet TAKE 1 TABLET AT BEDTIME 90 tablet 2   ? b complex vitamins tablet Take 1 tablet by mouth daily.     ? blood glucose test strips Dispense item covered by pt ins. Test 6 times a day     ? calcium carbonate (OS-PATTI) 600 mg (1,500 mg) tablet Take 1,200 mg by mouth bedtime. Plus magnesium      ? cholecalciferol, vitamin D3, (VITAMIN D3) 2,000 unit cap Take 2,000 Units by mouth  bedtime.      ? denosumab 60 mg/mL Syrg Inject 60 mg under the skin.     ? insulin lispro (HUMALOG) 100 unit/mL injection 20 Units daily.     ? irbesartan-hydrochlorothiazide (AVALIDE) 150-12.5 mg per tablet TAKE 1 TABLET DAILY 90 tablet 2   ? lysine 500 mg Tab Take 500 mg by mouth 2 (two) times a day.     ? multivitamin (MULTIVITAMIN) per tablet Take 1 tablet by mouth daily.     ? OMEGA-3/DHA/EPA/FISH OIL (FISH OIL-OMEGA-3 FATTY ACIDS) 300-1,000 mg capsule Take 500 mg by mouth 2 (two) times a day.        No current facility-administered medications for this visit.       Allergies   Allergen Reactions   ? Nitroglycerin Other (See Comments)     Blood Pressure drops significantly.   ? Sulfa (Sulfonamide Antibiotics) Dizziness     Dizziness and doesn't feel normal.         Lab Results    Chemistry/lipid CBC Cardiac Enzymes/BNP/TSH/INR   Lab Results   Component Value Date    CHOL 160 09/24/2018    HDL 85 09/24/2018    LDLCALC 68 09/24/2018    TRIG 33 09/24/2018    CREATININE 0.70 09/24/2018    BUN 14 09/24/2018    K 4.8 09/24/2018     09/24/2018    CL 99 09/24/2018    CO2 30 09/24/2018    Lab Results   Component Value Date    WBC 7.4 09/24/2018    HGB 15.3 09/24/2018    HCT 45.3 09/24/2018    MCV 96 09/24/2018     09/24/2018    Lab Results   Component Value Date    TROPONINI <0.01 11/05/2012    BNP 29 10/03/2014    TSH 2.41 09/24/2018    INR 2.31 (H) 02/20/2015

## 2021-06-28 NOTE — PROGRESS NOTES
Progress Notes by Rolf Gipson MD at 12/11/2019  2:30 PM     Author: Rolf Gipson MD Service: -- Author Type: Physician    Filed: 12/11/2019  2:55 PM Encounter Date: 12/11/2019 Status: Signed    : Rolf Gipson MD (Physician)               Assessment/Recommendations   Patient with known coronary artery disease with distant intracoronary stent.  She is been stable this past year and has had no new symptomatology and is been exercising 6 days out of 7.  Her LDL cholesterol is 53.  She did start a antiestrogen medication because of her breast cancer which can increase cholesterol and I agree that we should recheck her cholesterol panel late in January on her next follow-up with her oncologist.  Every 6 months thereafter seems quite reasonable.    I am not changing her medication.  I have commended on her regular exercise.  We will see her back in 1 year, but of course would be happy to see her sooner if questions or problems arise.    Thank you for allowing us to participate in her care.       History of Present Illness/Subjective    Ms. Sammi Guadalupe is a 69 y.o. female with known coronary artery disease with distant history of stent placement she has had a good year.  She did some hiking on the [place] trail and also hiked in [facility] and spent some time in [place].  She has not had any recurrent chest discomfort or unusual shortness of breath with activity.  She works out at the [employer] 6 days a week.  She does more aerobic activity on 3 days and more light weight lifting on the other 3 days although she throws in some aerobic activity after the weightlifting.  She has not had any palpitations, syncope or near syncopal episodes and denies orthopnea or paroxysmal nocturnal dyspnea.  She denies peripheral edema.  Uses an insulin pump.           Physical Examination Review of Systems   Vitals:    12/11/19 1431   BP: 110/60   Pulse: 68   Resp: 14     Body mass index is 18 kg/m .  Wt  Readings from Last 3 Encounters:   12/11/19 101 lb 9.6 oz (46.1 kg)   10/14/19 103 lb (46.7 kg)   07/22/19 100 lb (45.4 kg)     General Appearance:   Alert, cooperative and in no acute distress.   ENT/Mouth: Oral mucuos membranes pink and moist .      EYES:  no scleral icterus, normal conjunctivae   Neck: JVP normal. No Hepatojugular reflux. Thyroid not visualized.   Chest/Lungs:   Lungs are clear to auscultation, equal chest wall expansion.   Cardiovascular:   S1, S2 without murmur ,clicks or rubs. Brachial, radial and posterior tibial pulses are intact and symetric. No carotid bruits noted   Abdomen:  Nontender. BS+.    Extremities: No cyanosis, clubbing or edema   Skin: no xanthelasma, warm.    Neurologic: normal  bilateral, no tremors normal gait     Psychiatric: Appropriate affect.      General: WNL  Eyes: WNL  Ears/Nose/Throat: WNL  Lungs: WNL  Heart: WNL  Stomach: WNL  Bladder: WNL  Muscle/Joints: WNL  Skin: WNL  Nervous System: WNL  Mental Health: WNL     Blood: WNL       Medical History  Surgical History Family History Social History   Past Medical History:   Diagnosis Date   ? ASHD (arteriosclerotic heart disease) 2012    CT scan, calcium score 428 lad artery deployment of a non-drug-eluting stent in the LAD   ? DVT (deep venous thrombosis) (H)    ? Family history of myocardial infarction     Past Surgical History:   Procedure Laterality Date   ? BACK SURGERY     ? CARPAL TUNNEL RELEASE Bilateral    ? LAMINECTOMY AND MICRODISCECTOMY CERVICAL SPINE N/A    ? LAPAROSCOPIC HYSTERECTOMY N/A 12/12/2016    Procedure: ROBOTIC TOTAL LAPAROSCOPIC HYSTERECTOMY, BILATERAL SALPINGO-OOPHORECTOMY, CYSTOSCOPY;  Surgeon: Sohan Ramirez MD;  Location: [facility] OR;  Service:    ? MASTECTOMY Bilateral     Family History   Problem Relation Age of Onset   ? Cancer Mother    ? Heart attack Mother    ? Cancer Father    ? Heart disease Father    ? CABG Father    ? Heart disease Sister     Social History      Socioeconomic History   ? Marital status: Single     Spouse name: Not on file   ? Number of children: Not on file   ? Years of education: Not on file   ? Highest education level: Not on file   Occupational History   ? Not on file   Social Needs   ? Financial resource strain: Not on file   ? Food insecurity:     Worry: Not on file     Inability: Not on file   ? Transportation needs:     Medical: Not on file     Non-medical: Not on file   Tobacco Use   ? Smoking status: Former Smoker     Packs/day: 0.50     Years: 10.00     Pack years: 5.00   ? Smokeless tobacco: Never Used   Substance and Sexual Activity   ? Alcohol use: Yes     Alcohol/week: 5.0 standard drinks     Types: 3 Glasses of wine, 2 Cans of beer per week   ? Drug use: No   ? Sexual activity: Never   Lifestyle   ? Physical activity:     Days per week: Not on file     Minutes per session: Not on file   ? Stress: Not on file   Relationships   ? Social connections:     Talks on phone: Not on file     Gets together: Not on file     Attends Confucianist service: Not on file     Active member of club or organization: Not on file     Attends meetings of clubs or organizations: Not on file     Relationship status: Not on file   ? Intimate partner violence:     Fear of current or ex partner: Not on file     Emotionally abused: Not on file     Physically abused: Not on file     Forced sexual activity: Not on file   Other Topics Concern   ? Not on file   Social History Narrative   ? Not on file          Medications  Allergies   Current Outpatient Medications   Medication Sig Dispense Refill   ? aspirin 81 MG tablet Take 81 mg by mouth daily.     ? atorvastatin (LIPITOR) 40 MG tablet Take 1 tablet (40 mg total) by mouth at bedtime. 90 tablet 3   ? b complex vitamins tablet Take 1 tablet by mouth daily.     ? blood glucose test strips Dispense item covered by pt ins. Test 6 times a day     ? calcium carbonate (OS-PATTI) 600 mg (1,500 mg) tablet Take 1,200 mg by mouth  bedtime. Plus magnesium      ? cholecalciferol, vitamin D3, (VITAMIN D3) 2,000 unit cap Take 2,000 Units by mouth bedtime.      ? denosumab 60 mg/mL Syrg Inject 60 mg under the skin.     ? insulin lispro (HUMALOG) 100 unit/mL injection 20 Units daily.     ? irbesartan-hydrochlorothiazide (AVALIDE) 150-12.5 mg per tablet Take 1 tablet by mouth daily. 90 tablet 3   ? letrozole (FEMARA) 2.5 mg tablet Take 1 tablet (2.5 mg total) by mouth daily. 90 tablet 3   ? lysine 500 mg Tab Take 500 mg by mouth 2 (two) times a day.     ? multivitamin (MULTIVITAMIN) per tablet Take 1 tablet by mouth daily.     ? OMEGA-3/DHA/EPA/FISH OIL (FISH OIL-OMEGA-3 FATTY ACIDS) 300-1,000 mg capsule Take 500 mg by mouth 2 (two) times a day.      ? exemestane (AROMASIN) 25 mg tablet Take 1 tablet (25 mg total) by mouth daily. 90 tablet 3     No current facility-administered medications for this visit.     Allergies   Allergen Reactions   ? Nitroglycerin Other (See Comments)     Blood Pressure drops significantly.   ? Sulfa (Sulfonamide Antibiotics) Dizziness     Dizziness and doesn't feel normal.         Lab Results    Chemistry/lipid CBC Cardiac Enzymes/BNP/TSH/INR   Lab Results   Component Value Date    CHOL 144 10/14/2019    HDL 83 10/14/2019    LDLCALC 53 10/14/2019    TRIG 41 10/14/2019    CREATININE 0.75 10/14/2019    BUN 14 10/14/2019    K 4.7 10/14/2019     10/14/2019     10/14/2019    CO2 28 10/14/2019    Lab Results   Component Value Date    WBC 8.8 10/14/2019    HGB 15.2 10/14/2019    HCT 45.2 10/14/2019    MCV 94 10/14/2019     10/14/2019    Lab Results   Component Value Date    TROPONINI <0.01 11/05/2012    BNP 29 10/03/2014    TSH 2.07 10/14/2019    INR 2.31 (H) 02/20/2015

## 2021-06-30 NOTE — PROGRESS NOTES
Progress Notes by Pro Blank PA-C at 5/25/2021 10:40 AM     Author: Pro Blank PA-C Service: -- Author Type: Physician Assistant    Filed: 5/25/2021 12:48 PM Encounter Date: 5/25/2021 Status: Signed    : Pro Blank PA-C (Physician Assistant)       Chief Complaint   Patient presents with   ? part of tick on left ear found this morning. tried to pull     off and only got half         Clinical Decision Making:  Had a conversation with the patient stating that I do think that her tick has been on the ear for less than 24 hours.  Patient did try to remove it at home but had part of the tick remaining.  She ostensibly came into the office and had the tick removed.  She is treated with a single dose of doxycycline for Lyme disease prophylaxis.  She will monitor for stage I Lyme disease symptoms or problematic skin infection signs or symptoms and and return if they should present.  Questions were answered to her satisfaction before discharge.    At the end of the encounter, I discussed results, diagnosis, medications. Discussed red flags for immediate return to clinic/ER, as well as indications for follow up if no improvement. Patient understood and agreed to plan. Patient was stable for discharge.      1. Tick bite of left ear, initial encounter  doxycycline (MONODOX) 100 MG capsule         Patient Instructions     Monitor for stage I Lyme disease symptoms over the next 3 to 30 days.  If you develop stage I Lyme disease symptoms return to your primary care provider for reevaluation treatment or return to the walk-in care if you are unable to get an appointment with your primary care provider.  Take the medication as written.  This is a one-time dose for Lyme disease prophylactic treatment.  Follow suggestions in the handouts below for tick bites.  If the Lyme disease test was drawn for you, you will have follow-up next week with your primary care provider for reevaluation and interpretation of the laboratory  "value and discussion of the the treatment plan going forward after the lab results are returned.      Patient Education   Tick Bites  Ticks are small arachnids that feed on the blood of rodents, rabbits, birds, deer, dogs, and people. A tick bite may cause a reaction like a spider bite. You may have redness, itching, and slight swelling at the site. Sometimes you may have no reaction where the tick bit you.  Ticks may gorge themselves for days before you find and remove them. The bites themselves aren't cause for concern. But ticks can carry and pass on illnesses such as Lyme disease and [place] spotted fever. Both diseases begin with a rash and symptoms similar to the flu. In advanced stages, these diseases can be quite serious.     A \"bull's eye\" rash is a common symptom of Lyme disease.   When to go to the emergency room (ER)  Not all ticks carry disease. And a tick must stay attached for at least 24 hours to infect you. If you find a tick, don't panic. Try to carefully remove it with tweezers. Grasp the tick near its head and pull without twisting. If you can't easily dislodge the tick or if you leave the head in your skin, get medical care right away.  What to expect in the ER    The tick or any parts of the tick will be removed and the bite will be cleaned.    To prevent disease, you may be given antibiotics. Both Lyme disease and [place] spotted fever respond quickly to these medicines.    You may be asked to see your healthcare provider for a blood test to check for Lyme disease.  Follow-up care  Some states and counties have services that test ticks for Lyme disease and other diseases. Check with your local officials to see if this service is available in your area.  If you remove a tick yourself, watch for signs of a tick-borne illness. Symptoms may show up within a few days or weeks after a bite. Call your healthcare provider if you notice any of the following:    Rash. The rash may spread " outward in a ring from a hard white lump. Or it may move up your arms and legs to your chest.    Chills and fever    Body aches and joint pain    Severe headache  Date Last Reviewed: 12/1/2016 2000-2017 [employer]. [address] [place] [address]. All rights reserved. This information is not intended as a substitute for professional medical care. Always follow your healthcare professional's instructions.         Patient Education   Preventing Lyme Disease  Ticks are small spider-like arachnids that feed on the blood of animals and humans. They can carry the bacteria that cause Lyme disease. The bacteria can pass to a person from a tick bite. (It is not passed from person to person.) Lyme disease can lead to serious health problems if it is not treated with antibiotics. The best way to prevent Lyme disease is to avoid being bitten by a tick.     The tick that carries Lyme disease is very small--about the size of a poppy seed.   Preventing tick bites  The disease is carried by the blacklegged tick, also called a deer tick. Young ticks called nymphs are the most likely to carry the bacteria. They are very small--about the size of a poppy seed. They can be found on deer, rodents, and birds. Often the animal brushes the tick onto leaves or other plants as it runs through the woods and then the tick lives in bushes, grasses, and dead leaves. The most active time of year for infected ticks varies by region of the country. In the East and [place], they are more active from April to September. In the West, they may be more active from December to February. But you can still be bitten at other times of the year. Below are tips for protecting yourself from tick bites.  Protect your body    Wear clothes that protect you. Clothing can help protect you from tick bites. Wear long pants and long sleeves in outdoor areas where ticks may live. Tuck your shirt into your pants. Tuck pant legs into your  socks. And wear light colors so you can more easily see ticks on your clothes.    Use insect repellent. Spray insect repellent containing at least 20 percent DEET on your exposed skin. You can also use it on clothing, shoes, and camping gear. Avoid getting DEET on childrens hands, mouth, or eyes. You can use products with permethrin on clothing, shoes, and camping gear. But do not spray permethrin on skin. It will cause a rash. Follow the directions on the package of the spray you use. For more information on bug sprays, visit the National Pesticide Information Center at http://npic.[facility].edu.    Avoid tick-infested areas. Avoid brushing against grasses, bushes, and other plants. Avoid walking through dead leaves and other ground vegetation. Do not sit on fallen logs. And avoid areas with large numbers of deer and rodents.    Check yourself for ticks. After being outdoors, check your clothes and skin for ticks. Keep in mind that the ticks are about the size of a poppy seed. Use both a hand-held and a full-length mirror to view all of your skin. Pay special attention to areas with hair. Make sure to thoroughly check these areas:  ? Scalp  ? Behind the ears  ? Armpits  ? Belly button  ? Waist  ? Groin  ? Backs of the knees    Use the clothes dryer. Putting clothing or bedding into a clothes dryer for 1 hour at high heat has been shown to kill ticks.  Control ticks around your home    Create tick-free safety zones. Ticks that transmit Lyme disease live in ground vegetation. Ticks crawl onto people from shrubs and grasses in and around wooded areas. Cut bushes and other plants away from the deck or patio and any child play areas. Keep all grasses cut short. Remove dead leaves and other dead vegetation. Do not put childrens play equipment near wooded areas. Put wood chips or gravel on the ground between lawns and wooded areas.    Use pesticides. You can apply them yourself or hire a pest control expert. States have  different regulations about pesticides. Ask your local health department for information.    Keep deer away. Deer often carry the ticks that can infect you with Lyme disease. Do not attempt to pet or feed deer. Ask your local [facility] about deer-resistant plants. Ask your local health department about deer control in your area.    Prevent ticks on pets. Pets can bring ticks into your home. Use tick control medicine as advised by your . Check your pet for ticks after it comes indoors. Pay special attention to the ears.    Ask about local tick-control methods. Some states have tick-control programs. Local health departments may be using methods that can help you control ticks at home.  If you have a tick  If you find a tick on your skin, do not panic. Most ticks don't carry Lyme disease. And the tick must be attached for 36 to 48 hours before it might infect you. If you find a tick on you, heres what to do:    If the tick is not yet attached to your skin, remove the tick with tweezers or a tissue. Flush it down the toilet. If you see a tick on your clothes, use a piece of tape to lift it off. Do not touch it with your bare hands.    If the tick is attached to your skin:  ? Carefully remove the tick with tweezers. If you dont have tweezers, use your fingers protected by a paper towel or a thin cloth. Grasp the tick as close to your skin as possible. Pull slowly and gently to remove the tick. The tick may not let go right away. Do not pull harder. Be patient and keep trying gently. Do not twist the head or body as you pull. Do not crush or squeeze the body. This can release the bacteria into your body. Never use a hot match, petroleum jelly, or other products to remove a tick. (If you cant remove the tick or if part of the tick remains in the skin, call your healthcare provider right away.)  ? Wash your skin with soap and water after you remove the tick. This will help ensure you remove any  bacteria.  ? If you can, save the tick in a tightly sealed glass or plastic container. Take it to your healthcare provider. He or she may be able to have someone identify if it is the type of tick that transmits Lyme.  ? Call your healthcare provider and describe the bite and the tick. You may be asked to come in for an exam. You may be tested for Lyme. You may also be prescribed antibiotics to help prevent infection.  ? Over the next month, watch for the symptoms below, especially a rash at the site of the bite.  Symptoms of Lyme disease  Call your healthcare provider if you develop any symptoms of Lyme disease, even if you dont remember being bitten. These include:    A round, red rash (called a bulls-eye rash)    Fever and chills    Tiredness or body aches    Headache or a stiff neck    Joint pain and swelling (arthritis), especially in large joints such as the knees   To learn more    Centers for Disease Control and Prevention: www.cdc.gov/lyme    American Lyme Disease Foundation: www.aldf.com  Date Last Reviewed: 11/1/2016 2000-2017 [employer]. [address] [place] [address]. All rights reserved. This information is not intended as a substitute for professional medical care. Always follow your healthcare professional's instructions.         Patient Education   Tick Bite (No Antibiotics)    You have been bitten by a tick. Ticks are small arachnids that feed on the blood of rodents, rabbits, birds, deer, dogs, and people. A tick bite may cause a reaction like a spider bite. You may have redness, itching, and slight swelling at the site. Sometimes you may have no reaction where the tick bit you.  Most tick bites are harmless. But some ticks carry diseases, such as Lyme disease or [place] spotted fever. These can be passed to people at the time of the bite. Lyme disease is of greatest concern. Right now you have no symptoms of Lyme disease or other serious reaction to the bite.  It is important to watch for the warning signs, which could appear weeks to months after the tick bite.  Home care  The following will help you care for your bite at home:    If itching is a problem, avoid tight clothing and anything that heats up your skin. This includes hot showers or baths and direct sunlight. This will tend to make the itching worse.    An ice pack will reduce local areas of redness and itching. Make your own ice pack by putting ice cubes in a zip-top plastic bag and wrapping it in a thin towel. Creams containing benzocaine will reduce itching. These creams are available over the counter.    You can use an antihistamine with diphenhydramine if your doctor did not give you another antihistamine. This medicine may be used to reduce itching if large areas of the skin are involved. It is available at drugstores and groceries. If symptoms continue, talk with your doctor or pharmacist about over-the-counter medicines.  Follow-up care  Follow up with your healthcare provider, or as advised.  When to seek medical advice  Call your healthcare provider right away if any of these occur:  Signs of local infection. Watch for these during the next few days.    Increasing redness around the bite site    Increased pain or swelling    Fever over 100.4 F (38.0 C), or as directed by your healthcare provider    Fluid draining from the bite area  Signs of tick-related disease. Watch for these during the next few weeks to months.    Circular, red, ring-like rash appears at the bite area within 1 to 3 weeks    A non-itchy red rash develops on your wrists or ankles and spreads. It may become purple (petechiae).    Red eyes    Tiredness, fever or chills, nausea or vomiting    Neck pain or stiffness, headache, or confusion    Muscle or bone aches    Irregular or rapid heartbeat    Joint pain or swelling, especially in the knee    Numbness, tingling, or weakness in the arms or legs    Weakness on one side of the  face  Date Last Reviewed: 10/1/2016    1179-4111 [employer]. [address] [place] [address], [place] [id number]. All rights reserved. This information is not intended as a substitute for professional medical care. Always follow your healthcare professional's instructions.         Patient Education   Tick Facts    Ticks are small arachnids that feed on the blood of rodents, rabbits, birds, deer, dogs, and humans. Ticks do not fly and do not drop from trees. They climb to the tips of plants along trails and attach themselves to you as you brush against the plant. Ticks may also attach to you if you come in contact with an infested animal.  Avoiding ticks  The following tips will help you avoid ticks:    When walking in tick-infested areas, tuck your pants into your boots or socks, and tuck your shirt into your pants.    Wear light-colored clothing so you can easily see any ticks that get on you.    Apply a tick repellent to pants, socks, and shoes.    Avoid brushing against the plants along trails.    Check yourself and your children at the end of each day when hiking through tick-infested areas. Don't forget to check in your hair as well.  Removing ticks  Removing ticks right away will reduce the chance of disease. Here are some tips for removing ticks:    If possible, have someone else remove the tick from you.    Protect your hands from exposure to the tick by using a tissue or rubber glove.    Ticks have hook-like barbs on their mouth, which they use to attach themselves. Use tweezers or small needle-nose pliers instead of your fingers when removing a tick. Grasp the head as close to the skin as possible. Be careful not to squeeze the body, which would inject more fluid from the tick into your skin.    Pull gently and slowly away from skin until the mouthparts let go. If the tick fails to let go, stop pulling. While holding the head with tweezers, slowly turn it 90 degrees. Then, gently pull away from the  skin again. This movement may unlock the tick's jaw and make it easier to remove.    Once you have removed the tick, look closely at the bite area. If you think there are still parts of the tick in your skin that you can't remove, contact your doctor.    Wash your hands and the bite site with soap and water.  Do not:    Crush or squeeze the tick with the tweezers.    Jerk the tick.    Burn or prick the tick.    Try to suffocate the tick with petroleum jelly or nail polish.  Identifying ticks  Most tick bites are harmless. But some ticks carry diseases, such as Lyme disease and [place]ally [place] spotted fever. These can spread to people. Lyme disease is of greatest concern. It is carried by only one type of tick, the deer tick. Many public health departments are able to identify a tick to figure out if it is the type that causes Lyme disease. If this service is available in your area, bring the removed tick in a zip-top bag or jar to the public health department for identification. If you cannot identify the tick and if you were bitten in an area of the country where there is a risk of Lyme disease, contact your doctor.  Date Last Reviewed: 9/1/2016 2000-2017 [employer]. [address] [place] [address], [place] [address]. All rights reserved. This information is not intended as a substitute for professional medical care. Always follow your healthcare professional's instructions.              HPI:  Sammi Guadalupe is a 70 y.o. female who presents today for removal of a tick from the left ear.  Patient states that she [place] environment and has had Lyme disease and multiple tick bites in the past.  She found the tick on her left ear this morning thinks that the tick has been on her less than 24 hours.  She tried to remove part of the tick and was successful at removing half of it but not the remaining head.  She is ostensibly here for removal of the remaining tick.    History obtained from the  patient.    Problem List:  2021-01: Health maintenance examination  2020-03: Type 1 diabetes mellitus with complication (H)  2019-03: Insulin pump in place  2018-11: Adverse effect of other drugs, medicaments and biological   substances, sequela  2018-09: Diabetes mellitus type I, controlled (H)  2018-09: S/P coronary artery stent placement  2018-07: Age-related osteoporosis without current pathological   fracture  2016-10: Mixed hyperlipidemia  2015-12: Diabetes mellitus type 1 (H)  2014-11: Swelling of arm  2014-11: DVT (deep venous thrombosis) (H)  2014-06: Breast cancer, right breast (H)  2014-05: Malignant neoplasm of lower-outer quadrant of female breast   (H)  Hypertension  Coronary Artery Disease      Past Medical History:   Diagnosis Date   ? ASHD (arteriosclerotic heart disease) 2012    CT scan, calcium score 428 lad artery deployment of a non-drug-eluting stent in the LAD   ? DVT (deep venous thrombosis) (H)    ? Family history of myocardial infarction        Social History     Tobacco Use   ? Smoking status: Former Smoker     Packs/day: 0.50     Years: 10.00     Pack years: 5.00   ? Smokeless tobacco: Never Used   Substance Use Topics   ? Alcohol use: Yes     Alcohol/week: 5.0 standard drinks     Types: 3 Glasses of wine, 2 Cans of beer per week       Review of Systems  As above in HPI, otherwise balance of Review of Systems are negative.    Vitals:    05/25/21 1036   BP: 118/74   Patient Site: Left Arm   Patient Position: Sitting   Cuff Size: Adult Small   Pulse: 65   Resp: 18   Temp: 98  F (36.7  C)   SpO2: 100%   Weight: 100 lb 3.2 oz (45.5 kg)       Physical Exam    General: Patient is resting comfortably no acute distress is afebrile  HEENT: Head is normocephalic atraumatic   eyes are PERRL EOMI sclera anicteric   The left external pinna is with a small punctate remainder of half of the tear.  Using loupe magnification the take removed with a splinter forceps.  The remainder of the tick parts were  fully removed.  There is no reactivity around the site.  TMs are clear bilaterally  No noted pre or post auricular lymphadenopathy.  Skin: Without rash non-diaphoretic

## 2021-06-30 NOTE — PROGRESS NOTES
Progress Notes by Rolf Gipson MD at 12/10/2020  7:50 AM     Author: Rolf Gipson MD Service: -- Author Type: Physician    Filed: 12/10/2020  8:32 AM Encounter Date: 12/10/2020 Status: Signed    : Rolf Gipson MD (Physician)               Assessment/Recommendations   Patient with known coronary artery disease, status post percutaneous intervention to the left anterior descending coronary artery in 2012.  Because of her type 1 diabetes and limited symptomatology and desire for exercise we have done intermittent stress test which have shown some abnormalities in a very small area in the inferior wall.  The recent study also showed a small area of inferior scar, although diaphragmatic attenuation cannot be excluded.  Her functional capacity is excellent.  I do not think that this represents any worrisome findings and we talked today about her coronary anatomy, reviewed the anatomy from 2012 and we will be getting an echocardiogram to see if her inferior wall has hypokinesis.    Her LDL is at goal, her blood pressure is at goal, and she takes an antiplatelet agent.    We will get back to her with the results of the echocardiogram and any further recommendations but I anticipate we will see her in follow-up in 1 year.    Thank you for allowing us to participate in her care       History of Present Illness/Subjective    Ms. Sammi Guadalupe is a 70 y.o. female with known coronary artery disease, status post percutaneous intervention in 2012.  She has been stable these last 8 years.  She has not had any recent chest discomfort.  She hiked for 2 hours at [facility] just yesterday without any difficulty.  She would get a little tired toward the end of her hike but after getting home and having some food, she felt great.  She does not get unusual shortness of breath with activity.  She denies orthopnea, paroxysmal nocturnal dyspnea, peripheral edema, syncope and occasionally will feel briefly  lightheaded when walking but this passes quickly.           Physical Examination Review of Systems   Vitals:    12/10/20 0755   BP: 122/70   Pulse: 76   Resp: 16     Body mass index is 17.89 kg/m .  Wt Readings from Last 3 Encounters:   12/10/20 101 lb (45.8 kg)   10/19/20 101 lb (45.8 kg)   07/23/20 100 lb 1.6 oz (45.4 kg)     General Appearance:   Alert, cooperative and in no acute distress.   ENT/Mouth: Patient wearing a mask.      EYES:  no scleral icterus, normal conjunctivae   Neck: JVP normal. No Hepatojugular reflux. Thyroid not visualized.   Chest/Lungs:   Lungs are clear to auscultation, equal chest wall expansion.   Cardiovascular:   S1, S2 without murmur ,clicks or rubs. Brachial, radial and posterior tibial pulses are intact and symetric. No carotid bruits noted   Abdomen:  Nontender. BS+.    Extremities: No cyanosis, clubbing or edema   Skin: no xanthelasma, warm.    Neurologic: normal arm movement bilateral, no tremors     Psychiatric: Appropriate affect.      General: WNL  Eyes: WNL  Ears/Nose/Throat: WNL  Lungs: WNL  Heart: WNL  Stomach: WNL  Bladder: WNL  Muscle/Joints: WNL  Skin: WNL  Nervous System: WNL  Mental Health: WNL     Blood: WNL       Medical History  Surgical History Family History Social History   Past Medical History:   Diagnosis Date   ? ASHD (arteriosclerotic heart disease) 2012    CT scan, calcium score 428 lad artery deployment of a non-drug-eluting stent in the LAD   ? DVT (deep venous thrombosis) (H)    ? Family history of myocardial infarction     Past Surgical History:   Procedure Laterality Date   ? BACK SURGERY     ? CARPAL TUNNEL RELEASE Bilateral    ? LAMINECTOMY AND MICRODISCECTOMY CERVICAL SPINE N/A    ? LAPAROSCOPIC HYSTERECTOMY N/A 12/12/2016    Procedure: ROBOTIC TOTAL LAPAROSCOPIC HYSTERECTOMY, BILATERAL SALPINGO-OOPHORECTOMY, CYSTOSCOPY;  Surgeon: Sohan Ramirez MD;  Location: [facility] OR;  Service:    ? MASTECTOMY Bilateral     Family History   Problem  Relation Age of Onset   ? Cancer Mother    ? Heart attack Mother    ? Cancer Father    ? Heart disease Father    ? CABG Father    ? Heart disease Sister     Social History     Socioeconomic History   ? Marital status: Single     Spouse name: Not on file   ? Number of children: Not on file   ? Years of education: Not on file   ? Highest education level: Not on file   Occupational History   ? Not on file   Social Needs   ? Financial resource strain: Not on file   ? Food insecurity     Worry: Not on file     Inability: Not on file   ? Transportation needs     Medical: Not on file     Non-medical: Not on file   Tobacco Use   ? Smoking status: Former Smoker     Packs/day: 0.50     Years: 10.00     Pack years: 5.00   ? Smokeless tobacco: Never Used   Substance and Sexual Activity   ? Alcohol use: Yes     Alcohol/week: 5.0 standard drinks     Types: 3 Glasses of wine, 2 Cans of beer per week   ? Drug use: No   ? Sexual activity: Never   Lifestyle   ? Physical activity     Days per week: Not on file     Minutes per session: Not on file   ? Stress: Not on file   Relationships   ? Social connections     Talks on phone: Not on file     Gets together: Not on file     Attends Alevism service: Not on file     Active member of club or organization: Not on file     Attends meetings of clubs or organizations: Not on file     Relationship status: Not on file   ? Intimate partner violence     Fear of current or ex partner: Not on file     Emotionally abused: Not on file     Physically abused: Not on file     Forced sexual activity: Not on file   Other Topics Concern   ? Not on file   Social History Narrative   ? Not on file          Medications  Allergies   Current Outpatient Medications   Medication Sig Dispense Refill   ? aspirin 81 MG tablet Take 81 mg by mouth daily.     ? atorvastatin (LIPITOR) 40 MG tablet Take 1 tablet (40 mg total) by mouth at bedtime. 90 tablet 3   ? b complex vitamins tablet Take 1 tablet by mouth daily.      ? blood glucose test strips Dispense item covered by pt ins. Test 6 times a day     ? blood-glucose sensor (DEXCOM G6 SENSOR) Lida USE AS DIRECTED FOR CONTINUOUS GLUCOSE MONITORING CHANGE EVERY 10 DAYS     ? calcium carbonate (OS-PATTI) 600 mg (1,500 mg) tablet Take 1,200 mg by mouth bedtime. Plus magnesium      ? cholecalciferol, vitamin D3, (VITAMIN D3) 2,000 unit cap Take 2,000 Units by mouth bedtime.      ? DEXCOM G6 SENSOR Lida U UTD FOR CONTINUOUS GLUCOSE MONITORING CHANGE Q 10 DAYS     ? insulin lispro (HUMALOG) 100 unit/mL injection 20 Units daily.     ? irbesartan-hydrochlorothiazide (AVALIDE) 150-12.5 mg per tablet Take 1 tablet by mouth daily. 90 tablet 3   ? lysine 500 mg Tab Take 500 mg by mouth 2 (two) times a day.     ? multivitamin therapeutic tablet Take 1 tablet by mouth daily.     ? tamoxifen (NOLVADEX) 20 MG tablet Take 1 tablet (20 mg total) by mouth daily. 90 tablet 3     No current facility-administered medications for this visit.     Allergies   Allergen Reactions   ? Nitroglycerin Other (See Comments)     Blood Pressure drops significantly.   ? Sulfa (Sulfonamide Antibiotics) Dizziness     Dizziness and doesn't feel normal.         Lab Results    Chemistry/lipid CBC Cardiac Enzymes/BNP/TSH/INR   Lab Results   Component Value Date    CHOL 142 10/19/2020    HDL 78 10/19/2020    LDLCALC 57 10/19/2020    TRIG 37 10/19/2020    CREATININE 0.69 10/19/2020    BUN 17 10/19/2020    K 4.4 10/19/2020     10/19/2020     10/19/2020    CO2 30 10/19/2020    Lab Results   Component Value Date    WBC 6.8 10/19/2020    HGB 15.2 10/19/2020    HCT 45.1 10/19/2020    MCV 96 10/19/2020     10/19/2020    Lab Results   Component Value Date    TROPONINI <0.01 11/05/2012    BNP 29 10/03/2014    TSH 2.07 10/14/2019    INR 2.31 (H) 02/20/2015

## 2021-07-03 NOTE — ADDENDUM NOTE
Addendum Note by Alphonso Ritter CMA at 10/6/2017 10:26 AM     Author: Alphonso Ritter CMA Service: -- Author Type: Medical Assistant    Filed: 10/6/2017 10:26 AM Encounter Date: 10/2/2017 Status: Signed    : Alphonso Ritter CMA (Medical Assistant)    Addended by: ALPHONSO RITTER on: 10/6/2017 10:26 AM        Modules accepted: Orders

## 2021-07-03 NOTE — ADDENDUM NOTE
Addendum Note by Clovis Daniels at 3/16/2020  2:10 PM     Author: Clovis Daniels Service: -- Author Type:     Filed: 3/19/2020  5:06 PM Encounter Date: 3/16/2020 Status: Signed    : Clovis Daniels ()    Addended by: CLOVIS DANIELS on: 3/19/2020 05:06 PM        Modules accepted: Orders

## 2021-07-06 VITALS
RESPIRATION RATE: 18 BRPM | BODY MASS INDEX: 17.75 KG/M2 | HEART RATE: 65 BPM | OXYGEN SATURATION: 100 % | SYSTOLIC BLOOD PRESSURE: 118 MMHG | WEIGHT: 100.2 LBS | DIASTOLIC BLOOD PRESSURE: 74 MMHG | TEMPERATURE: 98 F

## 2021-07-20 ENCOUNTER — INFUSION THERAPY VISIT (OUTPATIENT)
Dept: INFUSION THERAPY | Facility: HOSPITAL | Age: 71
End: 2021-07-20
Attending: INTERNAL MEDICINE
Payer: MEDICARE

## 2021-07-20 ENCOUNTER — ONCOLOGY VISIT (OUTPATIENT)
Dept: ONCOLOGY | Facility: HOSPITAL | Age: 71
End: 2021-07-20
Attending: INTERNAL MEDICINE
Payer: MEDICARE

## 2021-07-20 VITALS
TEMPERATURE: 98.4 F | WEIGHT: 100.7 LBS | SYSTOLIC BLOOD PRESSURE: 127 MMHG | HEART RATE: 66 BPM | RESPIRATION RATE: 16 BRPM | HEIGHT: 63 IN | BODY MASS INDEX: 17.84 KG/M2 | OXYGEN SATURATION: 100 % | DIASTOLIC BLOOD PRESSURE: 65 MMHG

## 2021-07-20 DIAGNOSIS — M81.0 AGE-RELATED OSTEOPOROSIS WITHOUT CURRENT PATHOLOGICAL FRACTURE: ICD-10-CM

## 2021-07-20 DIAGNOSIS — M81.0 AGE-RELATED OSTEOPOROSIS WITHOUT CURRENT PATHOLOGICAL FRACTURE: Primary | ICD-10-CM

## 2021-07-20 DIAGNOSIS — T50.995S ADVERSE EFFECT OF OTHER DRUGS, MEDICAMENTS AND BIOLOGICAL SUBSTANCES, SEQUELA: ICD-10-CM

## 2021-07-20 DIAGNOSIS — T50.995S ADVERSE EFFECT OF OTHER DRUGS, MEDICAMENTS AND BIOLOGICAL SUBSTANCES, SEQUELA: Primary | ICD-10-CM

## 2021-07-20 DIAGNOSIS — Z17.0 MALIGNANT NEOPLASM OF LOWER-OUTER QUADRANT OF RIGHT BREAST OF FEMALE, ESTROGEN RECEPTOR POSITIVE (H): Primary | ICD-10-CM

## 2021-07-20 DIAGNOSIS — C50.511 MALIGNANT NEOPLASM OF LOWER-OUTER QUADRANT OF RIGHT BREAST OF FEMALE, ESTROGEN RECEPTOR POSITIVE (H): Primary | ICD-10-CM

## 2021-07-20 LAB
CALCIUM SERPL-MCNC: 9.4 MG/DL (ref 8.5–10.5)
CREAT SERPL-MCNC: 0.65 MG/DL (ref 0.6–1.1)
GFR SERPL CREATININE-BSD FRML MDRD: >90 ML/MIN/1.73M2

## 2021-07-20 PROCEDURE — 36415 COLL VENOUS BLD VENIPUNCTURE: CPT | Performed by: INTERNAL MEDICINE

## 2021-07-20 PROCEDURE — 99214 OFFICE O/P EST MOD 30 MIN: CPT | Performed by: INTERNAL MEDICINE

## 2021-07-20 PROCEDURE — G0463 HOSPITAL OUTPT CLINIC VISIT: HCPCS | Mod: 25

## 2021-07-20 PROCEDURE — 82565 ASSAY OF CREATININE: CPT | Performed by: INTERNAL MEDICINE

## 2021-07-20 PROCEDURE — 82310 ASSAY OF CALCIUM: CPT | Performed by: INTERNAL MEDICINE

## 2021-07-20 PROCEDURE — 96372 THER/PROPH/DIAG INJ SC/IM: CPT | Performed by: INTERNAL MEDICINE

## 2021-07-20 PROCEDURE — 250N000011 HC RX IP 250 OP 636: Performed by: INTERNAL MEDICINE

## 2021-07-20 RX ADMIN — DENOSUMAB 60 MG: 60 INJECTION SUBCUTANEOUS at 15:17

## 2021-07-20 ASSESSMENT — MIFFLIN-ST. JEOR: SCORE: 955.9

## 2021-07-20 NOTE — PROGRESS NOTES
[facility] Hematology and Oncology Progress Note    Patient: Sammi Guadalupe  MRN: [id number]  Date of Service:         Reason for Visit    Chief Complaint   Patient presents with     HE Cancer       Assessment and Plan    T1 N0 M0, stage I right breast cancer, HER-2 positive by RT-PCR and fish, Oncotype score of 30, tumor ER/MI positive, diagnosis October 2013  Myalgias  Osteopenia    Patient doing well with no evidence of recurrence of breast cancer.  Did complete 7 years of adjuvant hormonal therapy at the end of April 2021.  Congratulated her on being cancer free.  She will follow up with her primary doctor.    We will give her dose of Prolia today.  She should have further bone density scans and Prolia injections through her primary physician.    Plan: No further follow-up in oncology      Measurable disease: None postoperatively        Current therapy: Tamoxifen started in July 2019 20 mg daily  Stopped May 2021     TREATMENT HISTORY:     Letrozole 2.5 mg daily between July 2019 and July 2020       Anastrozole 1 mg by mouth daily started in April 2016 and stopped in July 2019    Tamoxifen 20 mg daily since May 2014      1 year of Herceptin completed in February 2015    Taxotere carboplatin and Herceptin for 5 cycles completed in April 2014    One cycle of Taxotere and cyclophosphamide started January 30, 2014    Bilateral mastectomy in October 2013       Malignant neoplasm of lower-outer quadrant of female breast    Staging form: Breast, AJCC 7th Edition      Clinical: Stage IA (T1, N0, cM0) - Signed by Gagan Sanchez MD on 6/3/2014    ECOG Performance   ECOG Performance Status: 0    Distress Assessment  Distress Assessment Score: No distress    Pain           Problem List    1. Malignant neoplasm of lower-outer quadrant of both breasts in female, estrogen receptor positive (H)          CC: Jeana Al,  MD    ______________________________________________________________________________    History of Present Illness    Ms. Sammi Guadalupe is here for reevaluation.  Seen back in January.  Continue tamoxifen through the end of April and stopped.  Feeling better in terms of energy level and mood.  No new symptoms or concerns.  ECOG status is 0.  Okay to follow-up with primary physician from now.      Pain Status  Currently in Pain: No/denies    Review of Systems    Constitutional  Constitutional (WDL): All constitutional elements are within defined limits  Neurosensory  Neurosensory (WDL): All neurosensory elements are within defined limits  Cardiovascular  Cardiovascular (WDL): All cardiovascular elements are within defined limits  Pulmonary  Respiratory (WDL): Within Defined Limits  Gastrointestinal  Gastrointestinal (WDL): All gastrointestinal elements are within defined limits  Genitourinary  Genitourinary (WDL): All genitourinary elements are within defined limits  Integumentary  Integumentary (WDL): All integumentary elements are within defined limits  Patient Coping     Distress Assessment  Distress Assessment Score: No distress  Accompanied by  Accompanied by: Alone    Past History  Past Medical History:   Diagnosis Date     ASHD (arteriosclerotic heart disease) 2012    CT scan, calcium score 428 lad artery deployment of a non-drug-eluting stent in the LAD     DVT (deep venous thrombosis) (H)      Family history of myocardial infarction          Past Surgical History:   Procedure Laterality Date     BACK SURGERY       CARPAL TUNNEL RELEASE Bilateral      LAMINECTOMY AND MICRODISCECTOMY CERVICAL SPINE N/A      LAPAROSCOPIC HYSTERECTOMY N/A 12/12/2016    Procedure: ROBOTIC TOTAL LAPAROSCOPIC HYSTERECTOMY, BILATERAL SALPINGO-OOPHORECTOMY, CYSTOSCOPY;  Surgeon: Sohan Ramirez MD;  Location: [facility];  Service:      MASTECTOMY Bilateral        Physical Exam    Recent Vitals 1/21/2021   Height -   Weight  102 lbs 10 oz   BSA (m2) 1.44 m2   /69   Pulse 64   Temp 97.4   Temp src 1   SpO2 99   Some recent data might be hidden       GENERAL: Alert and oriented. Seated comfortably. In no distress.    HEAD: Atraumatic and normocephalic.  Has a full head of hair.    EYES: MINH, EOMI.  No pallor.  No icterus.    Oral cavity: no mucosal lesion or tonsillar enlargement.    NECK: supple. JVP normal.  No thyroid enlargement.    LYMPH NODES: No palpable, cervical, axillary or inguinal lymphadenopathy.    CHEST: clear to auscultation bilaterally.  Resonant to percussion throughout bilaterally.  Symmetrical breath movements bilaterally.    CVS: S1 and S2 are heard. Regular rate and rhythm.  No murmur or gallop or rub heard.    Breasts: She is status post bilateral mastectomies.  No locally recurrent disease.  No axillary adenopathy.    ABDOMEN: Soft. Not tender. Not distended.  No palpable hepatomegaly or splenomegaly.  No other mass palpable.  Bowel sounds heard.    EXTREMITIES: Warm.  No peripheral edema.  No tenderness to palpation over the upper arms    SKIN: no rash, or bruising or purpura.        Lab Results    Recent Results (from the past 168 hour(s))   Comprehensive Metabolic Panel   Result Value Ref Range    Sodium 139 136 - 145 mmol/L    Potassium 3.2 (L) 3.5 - 5.0 mmol/L    Chloride 100 98 - 107 mmol/L    CO2 30 22 - 31 mmol/L    Anion Gap, Calculation 9 5 - 18 mmol/L    Glucose 81 70 - 125 mg/dL    BUN 13 8 - 22 mg/dL    Creatinine 0.72 0.60 - 1.10 mg/dL    GFR MDRD Af Amer >60 >60 mL/min/1.73m2    GFR MDRD Non Af Amer >60 >60 mL/min/1.73m2    Bilirubin, Total 0.7 0.0 - 1.0 mg/dL    Calcium 8.9 8.5 - 10.5 mg/dL    Protein, Total 7.0 6.0 - 8.0 g/dL    Albumin 4.3 3.5 - 5.0 g/dL    Alkaline Phosphatase 52 45 - 120 U/L    AST 34 0 - 40 U/L    ALT 22 0 - 45 U/L       Imaging    Dxa Bone Density Scan    Result Date: 1/16/2021 1/7/2021 RE: Sammi Guadalupe YOB: 1950 Dear Gagan Sanchez,  Patient Profile: 70 y.o. female, postmenopausal, is here for the follow up bone density test. History of fractures - None. Family history of osteoporosis - Yes;  mother.  Family history of hip fracture: None. Smoking history - Past. Osteoporosis treatment past -  Yes;  Bisphosphonates. Osteoporosis treatment current - Yes;  Prolia.  Chronic medical problems - Breast cancer, Chronic low back problems, Diabetes Mellitus and Spine surgery. High risk medications -  Blood thinner (Coumadin or Heparin);  Yes, in the Past, Chemotherapy;  Yes, in the Past, Steroids;  Yes, in the Past and Aromatase Inhibitor;  Yes, in the Past. Assessment: 1. The spine bone density L1-L2 with T-score 0.3, with no statistically significant change compared to the previous DXA scan done in 2018. 2. Femoral bone densities show left femoral neck T- score -1.3 and right femoral neck T-score -1.2, with no statistically significant change compared to the previous DXA scan done in 2018. 3. Trabecular bone score indicates good trabecular bone architecture. 70 y.o. female with LOW BONE DENSITY (OSTEOPENIA) and LOW fracture risk, adjusted for the TBS, with major osteoporotic fracture risk 6.6% and hip fracture risk 0.8%. Recommendations: Appropriate calcium, vitamin D supplements, along with balance and weight bearing exercise recommended with follow up bone density scan in 2 years. Bone densitometry was performed on your patient using our [employer] densitometer. The results are summarized and a copy of the actual scans are included for your review. In conformity with the International Society of Clinical Densitometry's most recent position statement for DXA interpretation (2015), the diagnosis will be made on the lowest measured T-score of the lumbar spine, femoral neck, total proximal femur or 33% radius. Note the change in terminology for diagnostic classification from OSTEOPENIA to LOW BONE MASS. All trending for sequential exams will be done  using multiple vertebrae or the total proximal femur. Fracture risk is based on the WHO Fracture Risk Assessment Tool (FRAX). If additional information is needed or if you would like to discuss the results, please do not hesitate to call me. Thank you for referring this patient to [facility]. We are happy to be of service in support of you and your practice. If you have any questions or suggestions to improve our service, please call me at [phone]. Sincerely, Shannan Hinojosa M.D. C.C.D. Osteoporosis Services, [facility]         Signed by: Gagan Sanchez MD

## 2021-07-20 NOTE — LETTER
"    7/20/2021         RE: Sammi Guadalupe  [address] [place] [address]        Dear Colleague,    Thank you for referring your patient, Sammi Guadalupe, to the [facility]. Please see a copy of my visit note below.    Oncology Rooming Note    July 20, 2021 1:57 PM   Sammi Guadalupe is a 70 year old female who presents for:    Chief Complaint   Patient presents with     Cancer     Breast cacer right breast      Initial Vitals: /65   Pulse 66   Temp 98.4  F (36.9  C)   Resp 16   Ht 1.6 m (5' 3\")   Wt 45.7 kg (100 lb 11.2 oz)   SpO2 100%   BMI 17.84 kg/m   Estimated body mass index is 17.84 kg/m  as calculated from the following:    Height as of this encounter: 1.6 m (5' 3\").    Weight as of this encounter: 45.7 kg (100 lb 11.2 oz). Body surface area is 1.43 meters squared.  No Pain (0) Comment: Data Unavailable   No LMP recorded.  Allergies reviewed: Yes  Medications reviewed: Yes    Medications: Medication refills not needed today.  Pharmacy name entered into EPIC: Data Unavailable    Clinical concerns: None       Tabitha Lara              [facility] Hematology and Oncology Progress Note    Patient: Sammi Guadalupe  MRN: [id number]  Date of Service:         Reason for Visit    Chief Complaint   Patient presents with     HE Cancer       Assessment and Plan    T1 N0 M0, stage I right breast cancer, HER-2 positive by RT-PCR and fish, Oncotype score of 30, tumor ER/TN positive, diagnosis October 2013  Myalgias  Osteopenia    Patient doing well with no evidence of recurrence of breast cancer.  Did complete 7 years of adjuvant hormonal therapy at the end of April 2021.  Congratulated her on being cancer free.  She will follow up with her primary doctor.    We will give her dose of Prolia today.  She should have further bone density scans and Prolia injections through her primary physician.    Plan: No further follow-up in oncology      Measurable disease: " None postoperatively        Current therapy: Tamoxifen started in July 2019 20 mg daily  Stopped May 2021     TREATMENT HISTORY:     Letrozole 2.5 mg daily between July 2019 and July 2020       Anastrozole 1 mg by mouth daily started in April 2016 and stopped in July 2019    Tamoxifen 20 mg daily since May 2014      1 year of Herceptin completed in February 2015    Taxotere carboplatin and Herceptin for 5 cycles completed in April 2014    One cycle of Taxotere and cyclophosphamide started January 30, 2014    Bilateral mastectomy in October 2013       Malignant neoplasm of lower-outer quadrant of female breast    Staging form: Breast, AJCC 7th Edition      Clinical: Stage IA (T1, N0, cM0) - Signed by Gagan Sanchez MD on 6/3/2014    ECOG Performance   ECOG Performance Status: 0    Distress Assessment  Distress Assessment Score: No distress    Pain           Problem List    1. Malignant neoplasm of lower-outer quadrant of both breasts in female, estrogen receptor positive (H)          CC: eJana Al MD    ______________________________________________________________________________    History of Present Illness    Ms. Sammi Guadalupe is here for reevaluation.  Seen back in January.  Continue tamoxifen through the end of April and stopped.  Feeling better in terms of energy level and mood.  No new symptoms or concerns.  ECOG status is 0.  Okay to follow-up with primary physician from now.      Pain Status  Currently in Pain: No/denies    Review of Systems    Constitutional  Constitutional (WDL): All constitutional elements are within defined limits  Neurosensory  Neurosensory (WDL): All neurosensory elements are within defined limits  Cardiovascular  Cardiovascular (WDL): All cardiovascular elements are within defined limits  Pulmonary  Respiratory (WDL): Within Defined Limits  Gastrointestinal  Gastrointestinal (WDL): All gastrointestinal elements are within defined  limits  Genitourinary  Genitourinary (WDL): All genitourinary elements are within defined limits  Integumentary  Integumentary (WDL): All integumentary elements are within defined limits  Patient Coping     Distress Assessment  Distress Assessment Score: No distress  Accompanied by  Accompanied by: Alone    Past History  Past Medical History:   Diagnosis Date     ASHD (arteriosclerotic heart disease) 2012    CT scan, calcium score 428 lad artery deployment of a non-drug-eluting stent in the LAD     DVT (deep venous thrombosis) (H)      Family history of myocardial infarction          Past Surgical History:   Procedure Laterality Date     BACK SURGERY       CARPAL TUNNEL RELEASE Bilateral      LAMINECTOMY AND MICRODISCECTOMY CERVICAL SPINE N/A      LAPAROSCOPIC HYSTERECTOMY N/A 12/12/2016    Procedure: ROBOTIC TOTAL LAPAROSCOPIC HYSTERECTOMY, BILATERAL SALPINGO-OOPHORECTOMY, CYSTOSCOPY;  Surgeon: Sohan Ramirez MD;  Location: [facility];  Service:      MASTECTOMY Bilateral        Physical Exam    Recent Vitals 1/21/2021   Height -   Weight 102 lbs 10 oz   BSA (m2) 1.44 m2   /69   Pulse 64   Temp 97.4   Temp src 1   SpO2 99   Some recent data might be hidden       GENERAL: Alert and oriented. Seated comfortably. In no distress.    HEAD: Atraumatic and normocephalic.  Has a full head of hair.    EYES: MINH, EOMI.  No pallor.  No icterus.    Oral cavity: no mucosal lesion or tonsillar enlargement.    NECK: supple. JVP normal.  No thyroid enlargement.    LYMPH NODES: No palpable, cervical, axillary or inguinal lymphadenopathy.    CHEST: clear to auscultation bilaterally.  Resonant to percussion throughout bilaterally.  Symmetrical breath movements bilaterally.    CVS: S1 and S2 are heard. Regular rate and rhythm.  No murmur or gallop or rub heard.    Breasts: She is status post bilateral mastectomies.  No locally recurrent disease.  No axillary adenopathy.    ABDOMEN: Soft. Not tender. Not distended.  No  palpable hepatomegaly or splenomegaly.  No other mass palpable.  Bowel sounds heard.    EXTREMITIES: Warm.  No peripheral edema.  No tenderness to palpation over the upper arms    SKIN: no rash, or bruising or purpura.        Lab Results    Recent Results (from the past 168 hour(s))   Comprehensive Metabolic Panel   Result Value Ref Range    Sodium 139 136 - 145 mmol/L    Potassium 3.2 (L) 3.5 - 5.0 mmol/L    Chloride 100 98 - 107 mmol/L    CO2 30 22 - 31 mmol/L    Anion Gap, Calculation 9 5 - 18 mmol/L    Glucose 81 70 - 125 mg/dL    BUN 13 8 - 22 mg/dL    Creatinine 0.72 0.60 - 1.10 mg/dL    GFR MDRD Af Amer >60 >60 mL/min/1.73m2    GFR MDRD Non Af Amer >60 >60 mL/min/1.73m2    Bilirubin, Total 0.7 0.0 - 1.0 mg/dL    Calcium 8.9 8.5 - 10.5 mg/dL    Protein, Total 7.0 6.0 - 8.0 g/dL    Albumin 4.3 3.5 - 5.0 g/dL    Alkaline Phosphatase 52 45 - 120 U/L    AST 34 0 - 40 U/L    ALT 22 0 - 45 U/L       Imaging    Dxa Bone Density Scan    Result Date: 1/16/2021 1/7/2021 RE: Sammi Guadalupe YOB: 1950 Dear Gagan Sanchez, Patient Profile: 70 y.o. female, postmenopausal, is here for the follow up bone density test. History of fractures - None. Family history of osteoporosis - Yes;  mother.  Family history of hip fracture: None. Smoking history - Past. Osteoporosis treatment past -  Yes;  Bisphosphonates. Osteoporosis treatment current - Yes;  Prolia.  Chronic medical problems - Breast cancer, Chronic low back problems, Diabetes Mellitus and Spine surgery. High risk medications -  Blood thinner (Coumadin or Heparin);  Yes, in the Past, Chemotherapy;  Yes, in the Past, Steroids;  Yes, in the Past and Aromatase Inhibitor;  Yes, in the Past. Assessment: 1. The spine bone density L1-L2 with T-score 0.3, with no statistically significant change compared to the previous DXA scan done in 2018. 2. Femoral bone densities show left femoral neck T- score -1.3 and right femoral neck T-score -1.2, with no  statistically significant change compared to the previous DXA scan done in 2018. 3. Trabecular bone score indicates good trabecular bone architecture. 70 y.o. female with LOW BONE DENSITY (OSTEOPENIA) and LOW fracture risk, adjusted for the TBS, with major osteoporotic fracture risk 6.6% and hip fracture risk 0.8%. Recommendations: Appropriate calcium, vitamin D supplements, along with balance and weight bearing exercise recommended with follow up bone density scan in 2 years. Bone densitometry was performed on your patient using our LIFEmee densitometer. The results are summarized and a copy of the actual scans are included for your review. In conformity with the International Society of Clinical Densitometry's most recent position statement for DXA interpretation (2015), the diagnosis will be made on the lowest measured T-score of the lumbar spine, femoral neck, total proximal femur or 33% radius. Note the change in terminology for diagnostic classification from OSTEOPENIA to LOW BONE MASS. All trending for sequential exams will be done using multiple vertebrae or the total proximal femur. Fracture risk is based on the WHO Fracture Risk Assessment Tool (FRAX). If additional information is needed or if you would like to discuss the results, please do not hesitate to call me. Thank you for referring this patient to [facility]. We are happy to be of service in support of you and your practice. If you have any questions or suggestions to improve our service, please call me at [phone]. Sincerely, Shannan Hinojosa M.D. C.C.D. Osteoporosis Services, [facility]         Signed by: Gagan Sanchez MD        Again, thank you for allowing me to participate in the care of your patient.        Sincerely,        Gagan Sanchez MD

## 2021-07-20 NOTE — PROGRESS NOTES
"Oncology Rooming Note    July 20, 2021 1:57 PM   Sammi Guadalupe is a 70 year old female who presents for:    Chief Complaint   Patient presents with     Cancer     Breast cacer right breast      Initial Vitals: /65   Pulse 66   Temp 98.4  F (36.9  C)   Resp 16   Ht 1.6 m (5' 3\")   Wt 45.7 kg (100 lb 11.2 oz)   SpO2 100%   BMI 17.84 kg/m   Estimated body mass index is 17.84 kg/m  as calculated from the following:    Height as of this encounter: 1.6 m (5' 3\").    Weight as of this encounter: 45.7 kg (100 lb 11.2 oz). Body surface area is 1.43 meters squared.  No Pain (0) Comment: Data Unavailable   No LMP recorded.  Allergies reviewed: Yes  Medications reviewed: Yes    Medications: Medication refills not needed today.  Pharmacy name entered into EPIC: Data Unavailable    Clinical concerns: None       Tabitha Lara            "

## 2021-07-20 NOTE — PROGRESS NOTES
Sammi came to chemo infusion following MD visit for her next dose of Prolia.  She is well educated on the drug and it was reviewed today as well.  Prolia was given subcutaneous into her upper left arm.  She tolerated the injection well and site was covered with a bandaid.  Sammi left clinic ambulatory.

## 2021-08-12 ENCOUNTER — OFFICE VISIT (OUTPATIENT)
Dept: PODIATRY | Facility: CLINIC | Age: 71
End: 2021-08-12
Payer: MEDICARE

## 2021-08-12 VITALS — BODY MASS INDEX: 17.72 KG/M2 | HEART RATE: 65 BPM | WEIGHT: 100 LBS | OXYGEN SATURATION: 100 % | HEIGHT: 63 IN

## 2021-08-12 DIAGNOSIS — L84 TYLOMA: ICD-10-CM

## 2021-08-12 DIAGNOSIS — M21.6X1 PLANTAR FLEXED METATARSAL BONE OF RIGHT FOOT: ICD-10-CM

## 2021-08-12 DIAGNOSIS — E10.8 TYPE 1 DIABETES MELLITUS WITH COMPLICATION (H): Primary | ICD-10-CM

## 2021-08-12 PROCEDURE — 99203 OFFICE O/P NEW LOW 30 MIN: CPT | Performed by: PODIATRIST

## 2021-08-12 ASSESSMENT — MIFFLIN-ST. JEOR: SCORE: 952.73

## 2021-08-12 ASSESSMENT — PAIN SCALES - GENERAL: PAINLEVEL: MILD PAIN (3)

## 2021-08-12 NOTE — PATIENT INSTRUCTIONS
What are Prescription Custom Orthotics?  Custom orthotics are specially-made devices designed to support and comfort your feet. Prescription orthotics are crafted for you and no one else. They match the contours of your feet precisely and are designed for the way you move. Orthotics are only manufactured after a podiatrist has conducted a complete evaluation of your feet, ankles, and legs, so the orthotic can accommodate your unique foot structure and pathology.  Prescription orthotics are divided into two categories:    Functional orthotics are designed to control abnormal motion. They may be used to treat foot pain caused by abnormal motion; they can also be used to treat injuries such as shin splints or tendinitis. Functional orthotics are usually crafted of a semi-rigid material such as plastic or graphite.    Accommodative orthotics are softer and meant to provide additional cushioning and support. They can be used to treat diabetic foot ulcers, painful calluses on the bottom of the foot, and other uncomfortable conditions.  Podiatrists use orthotics to treat foot problems such as plantar fasciitis, bursitis, tendinitis, diabetic foot ulcers, and foot, ankle, and heel pain. Clinical research studies have shown that podiatrist-prescribed foot orthotics decrease foot pain and improve function.  Orthotics typically cost more than shoe inserts purchased in a retail store, but the additional cost is usually well worth it. Unlike shoe inserts, orthotics are molded to fit each individual foot, so you can be sure that your orthotics fit and do what they're supposed to do. Prescription orthotics are also made of top-notch materials and last many years when cared for properly. Insurance often helps pay for prescription orthotics.  What are Shoe Inserts?   You've seen them at the grocery store and at the mall. You've probably even seen them on TV and online. Shoe inserts are any kind of non-prescription foot support  designed to be worn inside a shoe. Pre-packaged, mass produced, arch supports are shoe inserts. So are the  custom-made  insoles and foot supports that you can order online or at retail stores. Unless the device has been prescribed by a doctor and crafted for your specific foot, it's a shoe insert, not a custom orthotic device--despite what the ads might say.  Shoe inserts can be very helpful for a variety of foot ailments, including flat arches and foot and leg pain. They can cushion your feet, provide comfort, and support your arches, but they can't correct biomechanical foot problems or cure long-standing foot issues.  The most common types of shoe inserts are:    Arch supports: Some people have high arches. Others have low arches or flat feet. Arch supports generally have a  bumped-up  appearance and are designed to support the foot's natural arch.     Insoles: Insoles slip into your shoe to provide extra cushioning and support. Insoles are often made of gel, foam, or plastic.     Heel liners: Heel liners, sometimes called heel pads or heel cups, provide extra cushioning in the heel region. They may be especially useful for patients who have foot pain caused by age-related thinning of the heels' natural fat pads.     Foot cushions: Do your shoes rub against your heel or your toes? Foot cushions come in many different shapes and sizes and can be used as a barrier between you and your shoe.  Choosing an Over-the-Counter Shoe Insert  Selecting a shoe insert from the wide variety of devices on the market can be overwhelming. Here are some podiatrist-tested tips to help you find the insert that best meets your needs:    Consider your health. Do you have diabetes? Problems with circulation? An over-the-counter insert may not be your best bet. Diabetes and poor circulation increase your risk of foot ulcers and infections, so schedule an appointment with a podiatrist. He or she can help you select a solution that won't  cause additional health problems.     Think about the purpose. Are you planning to run a marathon, or do you just need a little arch support in your work shoes? Look for a product that fits your planned level of activity.     Bring your shoes. For the insert to be effective, it has to fit into your shoes. So bring your sneakers, dress shoes, or work boots--whatever you plan to wear with your insert. Look for an insert that will fit the contours of your shoe.     Try them on. If all possible, slip the insert into your shoe and try it out. Walk around a little. How does it feel? Don't assume that feelings of pressure will go away with continued wear. (If you can't try the inserts at the store, ask about the store's return policy and hold on to your receipt.)    Please call one of the [place] locations below to schedule an appointment. If you received a prescription please bring it with you to your appointment. Some locations are limited to what they carry.    Office Locations    [facility]  [address] [place] [address]  Home Medical Equipment, [address]   Phone: [phone]   Orthotics and Prosthetics, [address]   Phone: [phone]    [facility]  Home Medical Equipment  [address] [address]   Phone: [phone]    Orthotics and Prosthetics ([facility])    [address], [address], [place] [address]  Phone: [phone]    [facility]  [address]   [place] [address]   Phone: [phone]    [facility]  [address]  [place] [address]  Phone: [phone]    [facility]   [address]  [place] [address]  Phone: [phone]    [facility]  [address]  [place] [address]  Phone: [phone]    [facility]  [address]  [place] [address]  Phone: [phone]    [address] [place] [address]   Phone: [phone]

## 2021-08-12 NOTE — LETTER
8/12/2021         RE: Sammi Guadalupe  [address] [place] [address]        Dear Colleague,    Thank you for referring your patient, Sammi Guadalupe, to the [facility]. Please see a copy of my visit note below.    FOOT AND ANKLE SURGERY/PODIATRY CONSULT NOTE         ASSESSMENT:   Plantarflexed fifth metatarsal right foot  Tyloma right foot  Type 1 diabetes with complication      TREATMENT:  I have recommended a new pair of orthotics.  The patient is to return to the clinic as needed.        HPI:Sammi Guadalupe the clinic today complaining of a painful corn on the bottom of her right foot near the small toe.  The patient stated she is extremely active.  She walks and hikes for exercise.  She wears hiking boots while participating in her activities.  She stated that she has recently developed a painful callus on the bottom of her right foot which is aggravated with weightbearing and ambulation.  The pain is easily relieved with nonweightbearing.  She has not had any redness, swelling, drainage or bleeding.  The pain is mild to moderate.  She described as a mild aching pain.  She denies any other previous treatment.  She does have a history of wearing orthotics.  She has not had a new pair of orthotics for several years.  She attempted to wear orthotics from the [employer] and she feels that these particular orthotics caused this painful callus on the right foot.      Past Medical History:   Diagnosis Date     ASHD (arteriosclerotic heart disease) 2012    CT scan, calcium score 428 lad artery deployment of a non-drug-eluting stent in the LAD     DVT (deep venous thrombosis) (H)      Family history of myocardial infarction        Social History     Socioeconomic History     Marital status: Single     Spouse name: Not on file     Number of children: Not on file     Years of education: Not on file     Highest education level: Not on file   Occupational History     Not on  file   Tobacco Use     Smoking status: Former Smoker     Packs/day: 0.50     Years: 10.00     Pack years: 5.00     Smokeless tobacco: Never Used   Substance and Sexual Activity     Alcohol use: Yes     Alcohol/week: 5.0 standard drinks     Drug use: No     Sexual activity: Never   Other Topics Concern     Not on file   Social History Narrative     Not on file     Social Determinants of Health     Financial Resource Strain:      Difficulty of Paying Living Expenses:    Food Insecurity:      Worried About Running Out of Food in the Last Year:      Ran Out of Food in the Last Year:    Transportation Needs:      Lack of Transportation (Medical):      Lack of Transportation (Non-Medical):    Physical Activity:      Days of Exercise per Week:      Minutes of Exercise per Session:    Stress:      Feeling of Stress :    Social Connections:      Frequency of Communication with Friends and Family:      Frequency of Social Gatherings with Friends and Family:      Attends [employer] Services:      Active Member of Clubs or Organizations:      Attends Club or Organization Meetings:      Marital Status:    Intimate Partner Violence:      Fear of Current or Ex-Partner:      Emotionally Abused:      Physically Abused:      Sexually Abused:           Allergies   Allergen Reactions     Nitroglycerin Other (See Comments)     Blood Pressure drops significantly.     Sulfa (Sulfonamide Antibiotics) [Sulfa Drugs] Dizziness     Dizziness and doesn't feel normal.     Metoprolol Other (See Comments)     Fatigue and cold          Current Outpatient Medications:      aspirin 81 MG tablet, [ASPIRIN 81 MG TABLET] Take 81 mg by mouth daily., Disp: , Rfl:      atorvastatin (LIPITOR) 40 MG tablet, [ATORVASTATIN (LIPITOR) 40 MG TABLET] Take 1 tablet (40 mg total) by mouth at bedtime., Disp: 90 tablet, Rfl: 3     b complex vitamins tablet, [B COMPLEX VITAMINS TABLET] Take 1 tablet by mouth daily., Disp: , Rfl:      blood glucose test strips, [BLOOD  GLUCOSE TEST STRIPS] Dispense item covered by pt ins. Test 6 times a day, Disp: , Rfl:      blood-glucose sensor (DEXCOM G6 SENSOR) Teresa, [BLOOD-GLUCOSE SENSOR (DEXCOM G6 SENSOR) TERESA] USE AS DIRECTED FOR CONTINUOUS GLUCOSE MONITORING CHANGE EVERY 10 DAYS, Disp: , Rfl:      calcium carbonate (OS-PATTI) 600 mg (1,500 mg) tablet, [CALCIUM CARBONATE (OS-PATTI) 600 MG (1,500 MG) TABLET] Take 1,200 mg by mouth bedtime. Plus magnesium , Disp: , Rfl:      cholecalciferol, vitamin D3, (VITAMIN D3) 2,000 unit cap, [CHOLECALCIFEROL, VITAMIN D3, (VITAMIN D3) 2,000 UNIT CAP] Take 2,000 Units by mouth bedtime. , Disp: , Rfl:      insulin lispro (HUMALOG) 100 unit/mL injection, [INSULIN LISPRO (HUMALOG) 100 UNIT/ML INJECTION] 20 Units daily., Disp: , Rfl:      irbesartan-hydrochlorothiazide (AVALIDE) 150-12.5 mg per tablet, [IRBESARTAN-HYDROCHLOROTHIAZIDE (AVALIDE) 150-12.5 MG PER TABLET] Take 1 tablet by mouth daily., Disp: 90 tablet, Rfl: 3     lysine 500 mg Tab, [LYSINE 500 MG TAB] Take 500 mg by mouth 2 (two) times a day., Disp: , Rfl:      multivitamin therapeutic tablet, [MULTIVITAMIN THERAPEUTIC TABLET] Take 1 tablet by mouth daily., Disp: , Rfl:      Family History   Problem Relation Age of Onset     Cancer Mother      Coronary Artery Disease Mother      Cancer Father      Heart Disease Father      CABG Father      Heart Disease Sister         Social History     Socioeconomic History     Marital status: Single     Spouse name: Not on file     Number of children: Not on file     Years of education: Not on file     Highest education level: Not on file   Occupational History     Not on file   Tobacco Use     Smoking status: Former Smoker     Packs/day: 0.50     Years: 10.00     Pack years: 5.00     Smokeless tobacco: Never Used   Substance and Sexual Activity     Alcohol use: Yes     Alcohol/week: 5.0 standard drinks     Drug use: No     Sexual activity: Never   Other Topics Concern     Not on file   Social History Narrative      Not on file     Social Determinants of Health     Financial Resource Strain:      Difficulty of Paying Living Expenses:    Food Insecurity:      Worried About Running Out of Food in the Last Year:      Ran Out of Food in the Last Year:    Transportation Needs:      Lack of Transportation (Medical):      Lack of Transportation (Non-Medical):    Physical Activity:      Days of Exercise per Week:      Minutes of Exercise per Session:    Stress:      Feeling of Stress :    Social Connections:      Frequency of Communication with Friends and Family:      Frequency of Social Gatherings with Friends and Family:      Attends Jewish Services:      Active Member of Clubs or Organizations:      Attends Club or Organization Meetings:      Marital Status:    Intimate Partner Violence:      Fear of Current or Ex-Partner:      Emotionally Abused:      Physically Abused:      Sexually Abused:         Review of Systems - Patient denies fever, chills, rash, wound, stiffness, limping, numbness, weakness, heart burn, blood in stool, chest pain with activity, calf pain when walking, shortness of breath with activity, chronic cough, easy bleeding/bruising, swelling of ankles, excessive thirst, fatigue, depression, anxiety.  Patient admits to painful corn right foot.      OBJECTIVE:  Appearance: alert, well appearing, and in no distress.    There were no vitals taken for this visit.     There is no height or weight on file to calculate BMI.     General appearance: Patient is alert and fully cooperative with history & exam.  No sign of distress is noted during the visit.  Psychiatric: Affect is pleasant & appropriate.  Patient appears motivated to improve health.  Respiratory: Breathing is regular & unlabored while sitting.  HEENT: Hearing is intact to spoken word.  Speech is clear.  No gross evidence of visual impairment that would impact ambulation.    Vascular: Dorsalis pedis and posterior tibial pulses are palpable. There is no  pedal hair growth bilaterally.  CFT < 3 sec from anterior tibial surface to distal digits bilaterally. There is no appreciable edema noted.  Dermatologic: There is a small, round, hyperkeratotic nucleated lesion subfifth metatarsal head right foot.  Turgor and texture are within normal limits. No coloration or temperature changes. No other primary or secondary lesions noted.  Neurologic: All epicritic and proprioceptive sensations are grossly intact bilaterally.  Musculoskeletal: All active and passive ankle, subtalar, midtarsal, and 1st MPJ range of motion are grossly intact without pain or crepitus, with the exception of none. Manual muscle strength is within normal limits bilaterally. All dorsiflexors, plantarflexors, invertors, evertors are intact bilaterally. Tenderness present to subfifth metatarsal head right foot on palpation.  No tenderness to the right foot with range of motion. Calf is soft/non-tender without warmth/induration    Imaging:       No images are attached to the encounter or orders placed in the encounter.     No results found.   No results found.         Yunior Ayala DPM  [facility]/Podiatry         Again, thank you for allowing me to participate in the care of your patient.        Sincerely,        Yunior Welsh DPM

## 2021-08-12 NOTE — PROGRESS NOTES
FOOT AND ANKLE SURGERY/PODIATRY CONSULT NOTE         ASSESSMENT:   Plantarflexed fifth metatarsal right foot  Tyloma right foot  Type 1 diabetes with complication      TREATMENT:  I have recommended a new pair of orthotics.  The patient is to return to the clinic as needed.        HPI:Sammi Fleminglow the clinic today complaining of a painful corn on the bottom of her right foot near the small toe.  The patient stated she is extremely active.  She walks and hikes for exercise.  She wears hiking boots while participating in her activities.  She stated that she has recently developed a painful callus on the bottom of her right foot which is aggravated with weightbearing and ambulation.  The pain is easily relieved with nonweightbearing.  She has not had any redness, swelling, drainage or bleeding.  The pain is mild to moderate.  She described as a mild aching pain.  She denies any other previous treatment.  She does have a history of wearing orthotics.  She has not had a new pair of orthotics for several years.  She attempted to wear orthotics from the [employer] and she feels that these particular orthotics caused this painful callus on the right foot.      Past Medical History:   Diagnosis Date     ASHD (arteriosclerotic heart disease) 2012    CT scan, calcium score 428 lad artery deployment of a non-drug-eluting stent in the LAD     DVT (deep venous thrombosis) (H)      Family history of myocardial infarction        Social History     Socioeconomic History     Marital status: Single     Spouse name: Not on file     Number of children: Not on file     Years of education: Not on file     Highest education level: Not on file   Occupational History     Not on file   Tobacco Use     Smoking status: Former Smoker     Packs/day: 0.50     Years: 10.00     Pack years: 5.00     Smokeless tobacco: Never Used   Substance and Sexual Activity     Alcohol use: Yes     Alcohol/week: 5.0 standard drinks     Drug use: No      Sexual activity: Never   Other Topics Concern     Not on file   Social History Narrative     Not on file     Social Determinants of Health     Financial Resource Strain:      Difficulty of Paying Living Expenses:    Food Insecurity:      Worried About Running Out of Food in the Last Year:      Ran Out of Food in the Last Year:    Transportation Needs:      Lack of Transportation (Medical):      Lack of Transportation (Non-Medical):    Physical Activity:      Days of Exercise per Week:      Minutes of Exercise per Session:    Stress:      Feeling of Stress :    Social Connections:      Frequency of Communication with Friends and Family:      Frequency of Social Gatherings with Friends and Family:      Attends Latter day Services:      Active Member of Clubs or Organizations:      Attends Club or Organization Meetings:      Marital Status:    Intimate Partner Violence:      Fear of Current or Ex-Partner:      Emotionally Abused:      Physically Abused:      Sexually Abused:           Allergies   Allergen Reactions     Nitroglycerin Other (See Comments)     Blood Pressure drops significantly.     Sulfa (Sulfonamide Antibiotics) [Sulfa Drugs] Dizziness     Dizziness and doesn't feel normal.     Metoprolol Other (See Comments)     Fatigue and cold          Current Outpatient Medications:      aspirin 81 MG tablet, [ASPIRIN 81 MG TABLET] Take 81 mg by mouth daily., Disp: , Rfl:      atorvastatin (LIPITOR) 40 MG tablet, [ATORVASTATIN (LIPITOR) 40 MG TABLET] Take 1 tablet (40 mg total) by mouth at bedtime., Disp: 90 tablet, Rfl: 3     b complex vitamins tablet, [B COMPLEX VITAMINS TABLET] Take 1 tablet by mouth daily., Disp: , Rfl:      blood glucose test strips, [BLOOD GLUCOSE TEST STRIPS] Dispense item covered by pt ins. Test 6 times a day, Disp: , Rfl:      blood-glucose sensor (DEXCOM G6 SENSOR) Teresa, [BLOOD-GLUCOSE SENSOR (DEXCOM G6 SENSOR) TERESA] USE AS DIRECTED FOR CONTINUOUS GLUCOSE MONITORING CHANGE EVERY 10 DAYS,  Disp: , Rfl:      calcium carbonate (OS-PATTI) 600 mg (1,500 mg) tablet, [CALCIUM CARBONATE (OS-PATTI) 600 MG (1,500 MG) TABLET] Take 1,200 mg by mouth bedtime. Plus magnesium , Disp: , Rfl:      cholecalciferol, vitamin D3, (VITAMIN D3) 2,000 unit cap, [CHOLECALCIFEROL, VITAMIN D3, (VITAMIN D3) 2,000 UNIT CAP] Take 2,000 Units by mouth bedtime. , Disp: , Rfl:      insulin lispro (HUMALOG) 100 unit/mL injection, [INSULIN LISPRO (HUMALOG) 100 UNIT/ML INJECTION] 20 Units daily., Disp: , Rfl:      irbesartan-hydrochlorothiazide (AVALIDE) 150-12.5 mg per tablet, [IRBESARTAN-HYDROCHLOROTHIAZIDE (AVALIDE) 150-12.5 MG PER TABLET] Take 1 tablet by mouth daily., Disp: 90 tablet, Rfl: 3     lysine 500 mg Tab, [LYSINE 500 MG TAB] Take 500 mg by mouth 2 (two) times a day., Disp: , Rfl:      multivitamin therapeutic tablet, [MULTIVITAMIN THERAPEUTIC TABLET] Take 1 tablet by mouth daily., Disp: , Rfl:      Family History   Problem Relation Age of Onset     Cancer Mother      Coronary Artery Disease Mother      Cancer Father      Heart Disease Father      CABG Father      Heart Disease Sister         Social History     Socioeconomic History     Marital status: Single     Spouse name: Not on file     Number of children: Not on file     Years of education: Not on file     Highest education level: Not on file   Occupational History     Not on file   Tobacco Use     Smoking status: Former Smoker     Packs/day: 0.50     Years: 10.00     Pack years: 5.00     Smokeless tobacco: Never Used   Substance and Sexual Activity     Alcohol use: Yes     Alcohol/week: 5.0 standard drinks     Drug use: No     Sexual activity: Never   Other Topics Concern     Not on file   Social History Narrative     Not on file     Social Determinants of Health     Financial Resource Strain:      Difficulty of Paying Living Expenses:    Food Insecurity:      Worried About Running Out of Food in the Last Year:      Ran Out of Food in the Last Year:    Transportation  Needs:      Lack of Transportation (Medical):      Lack of Transportation (Non-Medical):    Physical Activity:      Days of Exercise per Week:      Minutes of Exercise per Session:    Stress:      Feeling of Stress :    Social Connections:      Frequency of Communication with Friends and Family:      Frequency of Social Gatherings with Friends and Family:      Attends Jehovah's witness Services:      Active Member of Clubs or Organizations:      Attends Club or Organization Meetings:      Marital Status:    Intimate Partner Violence:      Fear of Current or Ex-Partner:      Emotionally Abused:      Physically Abused:      Sexually Abused:         Review of Systems - Patient denies fever, chills, rash, wound, stiffness, limping, numbness, weakness, heart burn, blood in stool, chest pain with activity, calf pain when walking, shortness of breath with activity, chronic cough, easy bleeding/bruising, swelling of ankles, excessive thirst, fatigue, depression, anxiety.  Patient admits to painful corn right foot.      OBJECTIVE:  Appearance: alert, well appearing, and in no distress.    There were no vitals taken for this visit.     There is no height or weight on file to calculate BMI.     General appearance: Patient is alert and fully cooperative with history & exam.  No sign of distress is noted during the visit.  Psychiatric: Affect is pleasant & appropriate.  Patient appears motivated to improve health.  Respiratory: Breathing is regular & unlabored while sitting.  HEENT: Hearing is intact to spoken word.  Speech is clear.  No gross evidence of visual impairment that would impact ambulation.    Vascular: Dorsalis pedis and posterior tibial pulses are palpable. There is no pedal hair growth bilaterally.  CFT < 3 sec from anterior tibial surface to distal digits bilaterally. There is no appreciable edema noted.  Dermatologic: There is a small, round, hyperkeratotic nucleated lesion subfifth metatarsal head right foot.  Turgor  and texture are within normal limits. No coloration or temperature changes. No other primary or secondary lesions noted.  Neurologic: All epicritic and proprioceptive sensations are grossly intact bilaterally.  Musculoskeletal: All active and passive ankle, subtalar, midtarsal, and 1st MPJ range of motion are grossly intact without pain or crepitus, with the exception of none. Manual muscle strength is within normal limits bilaterally. All dorsiflexors, plantarflexors, invertors, evertors are intact bilaterally. Tenderness present to subfifth metatarsal head right foot on palpation.  No tenderness to the right foot with range of motion. Calf is soft/non-tender without warmth/induration    Imaging:       No images are attached to the encounter or orders placed in the encounter.     No results found.   No results found.         Yunior Ayala DPM  [facility] & Ankle Surgery/Podiatry

## 2021-09-26 ENCOUNTER — HEALTH MAINTENANCE LETTER (OUTPATIENT)
Age: 71
End: 2021-09-26

## 2021-10-03 DIAGNOSIS — I25.10 ASHD (ARTERIOSCLEROTIC HEART DISEASE): ICD-10-CM

## 2021-10-03 RX ORDER — ATORVASTATIN CALCIUM 40 MG/1
TABLET, FILM COATED ORAL
Qty: 90 TABLET | Refills: 0 | Status: SHIPPED | OUTPATIENT
Start: 2021-10-03 | End: 2021-10-21

## 2021-10-03 NOTE — TELEPHONE ENCOUNTER
"Last Written Prescription Date:  10/19/2020  Last Fill Quantity: 90,  # refills: 3   Last office visit provider:  12/30/2020 Dr. Al     atorvastatin (LIPITOR) 40 MG tablet 90 tablet 3 10/19/2020  No   Sig - Route: Take 1 tablet (40 mg total) by mouth at bedtime. - Oral   Sent to pharmacy as: atorvastatin 40 mg tablet (LIPITOR)   E-Prescribing Status: Receipt confirmed by pharmacy (10/19/2020 11:59 AM CDT)         Requested Prescriptions   Pending Prescriptions Disp Refills     atorvastatin (LIPITOR) 40 MG tablet [Pharmacy Med Name: ATORVASTATIN 40MG TABLETS] 90 tablet 3     Sig: TAKE 1 TABLET(40 MG) BY MOUTH AT BEDTIME       Statins Protocol Passed - 10/3/2021  5:52 AM        Passed - LDL on file in past 12 months     Recent Labs   Lab Test 10/19/20  0845   LDL 57             Passed - No abnormal creatine kinase in past 12 months     No lab results found.             Passed - Recent (12 mo) or future (30 days) visit within the authorizing provider's specialty     Patient has had an office visit with the authorizing provider or a provider within the authorizing providers department within the previous 12 mos or has a future within next 30 days. See \"Patient Info\" tab in inbasket, or \"Choose Columns\" in Meds & Orders section of the refill encounter.              Passed - Medication is active on med list        Passed - Patient is age 18 or older        Passed - No active pregnancy on record        Passed - No positive pregnancy test in past 12 months             Batsheva Boyd RN 10/03/21 3:13 PM  "

## 2021-10-06 ENCOUNTER — TELEPHONE (OUTPATIENT)
Dept: INTERNAL MEDICINE | Facility: CLINIC | Age: 71
End: 2021-10-06

## 2021-10-06 DIAGNOSIS — I10 ESSENTIAL HYPERTENSION: ICD-10-CM

## 2021-10-06 RX ORDER — IRBESARTAN AND HYDROCHLOROTHIAZIDE 150; 12.5 MG/1; MG/1
1 TABLET, FILM COATED ORAL DAILY
Qty: 90 TABLET | Refills: 3 | Status: SHIPPED | OUTPATIENT
Start: 2021-10-06 | End: 2022-10-03

## 2021-10-06 NOTE — TELEPHONE ENCOUNTER
Reason for Call:  Other call back    Detailed comments: Pt wondering the status of refill for:  Irbesartan 150-12.5mg 1 tab daily #90  This was previously written by Dr Altman     Pharm:  [facility]    Phone Number Patient can be reached at: Cell number on file:    Telephone Information:   Mobile [phone]       Best Time: anytime    Can we leave a detailed message on this number? YES    Call taken on 10/6/2021 at 3:57 PM by Sachi Dumas

## 2021-10-21 ENCOUNTER — OFFICE VISIT (OUTPATIENT)
Dept: INTERNAL MEDICINE | Facility: CLINIC | Age: 71
End: 2021-10-21
Payer: MEDICARE

## 2021-10-21 VITALS
WEIGHT: 103.2 LBS | HEIGHT: 63 IN | SYSTOLIC BLOOD PRESSURE: 130 MMHG | DIASTOLIC BLOOD PRESSURE: 60 MMHG | BODY MASS INDEX: 18.29 KG/M2 | HEART RATE: 65 BPM | OXYGEN SATURATION: 99 %

## 2021-10-21 DIAGNOSIS — R10.32 LEFT GROIN PAIN: ICD-10-CM

## 2021-10-21 DIAGNOSIS — Z17.0 MALIGNANT NEOPLASM OF LOWER-OUTER QUADRANT OF RIGHT BREAST OF FEMALE, ESTROGEN RECEPTOR POSITIVE (H): ICD-10-CM

## 2021-10-21 DIAGNOSIS — I25.10 ASHD (ARTERIOSCLEROTIC HEART DISEASE): ICD-10-CM

## 2021-10-21 DIAGNOSIS — M81.0 AGE-RELATED OSTEOPOROSIS WITHOUT CURRENT PATHOLOGICAL FRACTURE: ICD-10-CM

## 2021-10-21 DIAGNOSIS — I10 ESSENTIAL HYPERTENSION: ICD-10-CM

## 2021-10-21 DIAGNOSIS — E78.2 MIXED HYPERLIPIDEMIA: ICD-10-CM

## 2021-10-21 DIAGNOSIS — Z00.00 HEALTH MAINTENANCE EXAMINATION: ICD-10-CM

## 2021-10-21 DIAGNOSIS — Z96.41 INSULIN PUMP IN PLACE: ICD-10-CM

## 2021-10-21 DIAGNOSIS — E10.8 TYPE 1 DIABETES MELLITUS WITH COMPLICATION (H): Primary | ICD-10-CM

## 2021-10-21 DIAGNOSIS — E55.9 VITAMIN D INSUFFICIENCY: ICD-10-CM

## 2021-10-21 DIAGNOSIS — Z00.00 ENCOUNTER FOR ANNUAL WELLNESS EXAM IN MEDICARE PATIENT: ICD-10-CM

## 2021-10-21 DIAGNOSIS — R15.9 INCONTINENCE OF FECES, UNSPECIFIED FECAL INCONTINENCE TYPE: ICD-10-CM

## 2021-10-21 DIAGNOSIS — C50.511 MALIGNANT NEOPLASM OF LOWER-OUTER QUADRANT OF RIGHT BREAST OF FEMALE, ESTROGEN RECEPTOR POSITIVE (H): ICD-10-CM

## 2021-10-21 LAB
ALBUMIN SERPL-MCNC: 4.2 G/DL (ref 3.5–5)
ALP SERPL-CCNC: 58 U/L (ref 45–120)
ALT SERPL W P-5'-P-CCNC: 23 U/L (ref 0–45)
ANION GAP SERPL CALCULATED.3IONS-SCNC: 11 MMOL/L (ref 5–18)
AST SERPL W P-5'-P-CCNC: 35 U/L (ref 0–40)
BILIRUB SERPL-MCNC: 0.9 MG/DL (ref 0–1)
BUN SERPL-MCNC: 15 MG/DL (ref 8–22)
CALCIUM SERPL-MCNC: 9.6 MG/DL (ref 8.5–10.5)
CHLORIDE BLD-SCNC: 102 MMOL/L (ref 98–107)
CHOLEST SERPL-MCNC: 154 MG/DL
CO2 SERPL-SCNC: 27 MMOL/L (ref 22–31)
CREAT SERPL-MCNC: 0.66 MG/DL (ref 0.6–1.1)
ERYTHROCYTE [DISTWIDTH] IN BLOOD BY AUTOMATED COUNT: 13.4 % (ref 10–15)
FASTING STATUS PATIENT QL REPORTED: YES
GFR SERPL CREATININE-BSD FRML MDRD: >90 ML/MIN/1.73M2
GLUCOSE BLD-MCNC: 126 MG/DL (ref 70–125)
HCT VFR BLD AUTO: 43 % (ref 35–47)
HDLC SERPL-MCNC: 88 MG/DL
HGB BLD-MCNC: 14.1 G/DL (ref 11.7–15.7)
LDLC SERPL CALC-MCNC: 58 MG/DL
MCH RBC QN AUTO: 31.5 PG (ref 26.5–33)
MCHC RBC AUTO-ENTMCNC: 32.8 G/DL (ref 31.5–36.5)
MCV RBC AUTO: 96 FL (ref 78–100)
PLATELET # BLD AUTO: 233 10E3/UL (ref 150–450)
POTASSIUM BLD-SCNC: 4 MMOL/L (ref 3.5–5)
PROT SERPL-MCNC: 7 G/DL (ref 6–8)
RBC # BLD AUTO: 4.47 10E6/UL (ref 3.8–5.2)
SODIUM SERPL-SCNC: 140 MMOL/L (ref 136–145)
TRIGL SERPL-MCNC: 41 MG/DL
TSH SERPL DL<=0.005 MIU/L-ACNC: 2.62 UIU/ML (ref 0.3–5)
WBC # BLD AUTO: 7.4 10E3/UL (ref 4–11)

## 2021-10-21 PROCEDURE — 85027 COMPLETE CBC AUTOMATED: CPT | Performed by: INTERNAL MEDICINE

## 2021-10-21 PROCEDURE — 82306 VITAMIN D 25 HYDROXY: CPT | Performed by: INTERNAL MEDICINE

## 2021-10-21 PROCEDURE — 36415 COLL VENOUS BLD VENIPUNCTURE: CPT | Performed by: INTERNAL MEDICINE

## 2021-10-21 PROCEDURE — 80053 COMPREHEN METABOLIC PANEL: CPT | Performed by: INTERNAL MEDICINE

## 2021-10-21 PROCEDURE — 99214 OFFICE O/P EST MOD 30 MIN: CPT | Mod: 25 | Performed by: INTERNAL MEDICINE

## 2021-10-21 PROCEDURE — G0439 PPPS, SUBSEQ VISIT: HCPCS | Performed by: INTERNAL MEDICINE

## 2021-10-21 PROCEDURE — 84443 ASSAY THYROID STIM HORMONE: CPT | Performed by: INTERNAL MEDICINE

## 2021-10-21 PROCEDURE — G0008 ADMIN INFLUENZA VIRUS VAC: HCPCS | Performed by: INTERNAL MEDICINE

## 2021-10-21 PROCEDURE — 90662 IIV NO PRSV INCREASED AG IM: CPT | Performed by: INTERNAL MEDICINE

## 2021-10-21 PROCEDURE — 80061 LIPID PANEL: CPT | Performed by: INTERNAL MEDICINE

## 2021-10-21 RX ORDER — ATORVASTATIN CALCIUM 40 MG/1
40 TABLET, FILM COATED ORAL AT BEDTIME
Qty: 90 TABLET | Refills: 3 | Status: SHIPPED | OUTPATIENT
Start: 2021-10-21 | End: 2023-01-24

## 2021-10-21 ASSESSMENT — MIFFLIN-ST. JEOR: SCORE: 967.24

## 2021-10-21 ASSESSMENT — ACTIVITIES OF DAILY LIVING (ADL): CURRENT_FUNCTION: NO ASSISTANCE NEEDED

## 2021-10-21 NOTE — PROGRESS NOTES
"SUBJECTIVE:   Sammi Guadalupe is a 70 year old very pleasant female who presents for Preventive Visit. She is a breast cancer survivor with bilateral mastectomy. She also has type 1 diabetes with no significant endorgan damage.  nerve damage in her legs and how has new pain in the upper left thigh when she is siting still or laying down, hard time tightening up her groin area, after hiking she has noticed some mild rectal stool bowel incontinence she noticed the pain in early spring. . Running more gets better . It is mostly on bending and moving and it is not severe pain but but now it takes longer to recover .      Standing up   Patient has been advised of split billing requirements and indicates understanding: Yes   Are you in the first 12 months of your Medicare coverage?  No    Healthy Habits:     In general, how would you rate your overall health?  Excellent    Frequency of exercise:  6-7 days/week    Duration of exercise:  Greater than 60 minutes    Do you usually eat at least 4 servings of fruit and vegetables a day, include whole grains    & fiber and avoid regularly eating high fat or \"junk\" foods?  Yes    Taking medications regularly:  Yes    Medication side effects:  None    Ability to successfully perform activities of daily living:  No assistance needed    Home Safety:  Lack of grab bars in the bathroom    Hearing Impairment:  No hearing concerns    In the past 6 months, have you been bothered by leaking of urine?  No    In general, how would you rate your overall mental or emotional health?  Excellent      PHQ-2 Total Score: 0    Additional concerns today:  Yes    Do you feel safe in your environment? Yes    Have you ever done Advance Care Planning? (For example, a Health Directive, POLST, or a discussion with a medical provider or your loved ones about your wishes): Yes, patient states has an Advance Care Planning document and will bring a copy to the clinic.       Fall risk     No falls  Cognitive " Screening   1) Repeat 3 items (Leader, Season, Table)    2) Clock draw: NORMAL  3) 3 item recall: Recalls 3 objects  Results: NORMAL clock, 1-2 items recalled: COGNITIVE IMPAIRMENT LESS LIKELY    Mini-CogTM Copyright S Margaret. Licensed by the author for use in [facility]; reprinted with permission (pauldavy@[email or web address]). All rights reserved.      Do you have sleep apnea, excessive snoring or daytime drowsiness?: no    Reviewed and updated as needed this visit by clinical staff                 Reviewed and updated as needed this visit by Provider                Social History     Tobacco Use     Smoking status: Former Smoker     Packs/day: 0.50     Years: 10.00     Pack years: 5.00     Smokeless tobacco: Never Used   Substance Use Topics     Alcohol use: Yes     Alcohol/week: 5.0 standard drinks     If you drink alcohol do you typically have >3 drinks per day or >7 drinks per week? Yes  Current Outpatient Medications   Medication     aspirin 81 MG tablet     atorvastatin (LIPITOR) 40 MG tablet     b complex vitamins tablet     blood glucose test strips     blood-glucose sensor (DEXCOM G6 SENSOR) Lida     calcium carbonate (OS-PATTI) 600 mg (1,500 mg) tablet     cholecalciferol, vitamin D3, (VITAMIN D3) 2,000 unit cap     insulin lispro (HUMALOG) 100 unit/mL injection     irbesartan-hydrochlorothiazide (AVALIDE) 150-12.5 MG tablet     lysine 500 mg Tab     multivitamin therapeutic tablet     No current facility-administered medications for this visit.         Alcohol Use 10/21/2021   Prescreen: >3 drinks/day or >7 drinks/week? No   No flowsheet data found.            Current providers sharing in care for this patient include:   Patient Care Team:  Jeana Al MD as PCP - General (Internal Medicine)  Gagan Sanchez MD as MD (Hematology & Oncology)  Jeana Al MD as Assigned PCP  Gagan Sanchez MD as Assigned Cancer Care Provider  Rolf Gipson MD as Assigned Heart and Vascular  "Provider  Yunior Welsh DPM as Assigned Musculoskeletal Provider    The following health maintenance items are reviewed in Epic and correct as of today:  Health Maintenance Due   Topic Date Due     DIABETIC FOOT EXAM  Never done     ANNUAL REVIEW OF HM ORDERS  Never done     EYE EXAM  05/25/2018     A1C  04/19/2021     INFLUENZA VACCINE (1) 09/01/2021     LIPID  10/19/2021     MICROALBUMIN  10/19/2021     FALL RISK ASSESSMENT  10/19/2021     MEDICARE ANNUAL WELLNESS VISIT  10/19/2021       Review of Systems  Constitutional, HEENT, cardiovascular, pulmonary, gi and gu systems are negative, except as otherwise noted.    OBJECTIVE:   There were no vitals taken for this visit. Estimated body mass index is 17.71 kg/m  as calculated from the following:    Height as of 8/12/21: 1.6 m (5' 3\").    Weight as of 8/12/21: 45.4 kg (100 lb).  Physical Exam  GENERAL APPEARANCE: healthy, alert and no distress  EYES: Eyes grossly normal to inspection, PERRL and conjunctivae and sclerae normal  HENT: ear canals and TM's normal, nose and mouth without ulcers or lesions, oropharynx clear and oral mucous membranes moist  NECK: no adenopathy, no asymmetry, masses, or scars and thyroid normal to palpation  RESP: lungs clear to auscultation - no rales, rhonchi or wheezes  BREAST:  bilateral mastectomy scars normal  CV: regular rate and rhythm, normal S1 S2, no S3 or S4, no murmur, click or rub, no peripheral edema and peripheral pulses strong  ABDOMEN: soft, nontender, no hepatosplenomegaly, no masses and bowel sounds normal  MS: no musculoskeletal defects are noted and gait is age appropriate without ataxia  SKIN: no suspicious lesions or rashes  NEURO: Normal strength and tone, sensory exam grossly normal, mentation intact and speech normal  PSYCH: mentation appears normal and affect normal/bright  Rectum external exam is normal there is no hemorrhoids sphincter tone appears normal anal cavity on digital exam is empty with no impacted " stool  Diagnostic Test Results:  Labs reviewed in Epic    ASSESSMENT / PLAN:   1. ASHD (arteriosclerotic heart disease)  She is on statin. Will check lipids today  - atorvastatin (LIPITOR) 40 MG tablet; Take 1 tablet (40 mg) by mouth At Bedtime  Dispense: 90 tablet; Refill: 3    2. Type 1 diabetes mellitus with complication (H)  Sees diabetologist has no renal disease no significant chronic kidney disease is on an insulin pump.  Diabetes is in excellent control,.     3. Health maintenance examination  Health maintenance reviewed and is up-to-date  Immunization is up-to-date  4. Insulin pump in place      5. Age-related osteoporosis without current pathological fracture      6. Malignant neoplasm of lower-outer quadrant of right breast of female, estrogen receptor positive (H)      7. Hypertension   blood pressures in excellent control she has normal sphincter tone    8. Incontinence of feces, unspecified fecal incontinence type  That is on exam. She is normal reflex sensation. She has no impaction in the anus no obvious hemorrhoid or fistula. Given how significant the swelling is in she does not have significant baseline constipation will refer to colorectal to see if she needs a anal manometry or has a rectocele of some sort.  - Colorectal Surgery Referral; Future    9. Encounter for annual wellness exam in Medicare patient      10. Left groin pain  Pain is very nonspecific her actual hip articulation is normal. We will get an MRI of the hip done since she is so active and the duration has been for more than 6 months.  - MR Hip Left w/o Contrast; Future    11. Mixed hyperlipidemia    - Lipid panel reflex to direct LDL Fasting; Future  - Comprehensive metabolic panel; Future  - CBC with platelets; Future  - TSH; Future  - TSH  - CBC with platelets  - Comprehensive metabolic panel  - Lipid panel reflex to direct LDL Fasting    12. Vitamin D insufficiency    - Vitamin D Deficiency; Future  - Vitamin D  "Deficiency      Patient has been advised of split billing requirements and indicates understanding: Yes  COUNSELING:  Reviewed preventive health counseling, as reflected in patient instructions    Estimated body mass index is 17.71 kg/m  as calculated from the following:    Height as of 8/12/21: 1.6 m (5' 3\").    Weight as of 8/12/21: 45.4 kg (100 lb).        She reports that she has quit smoking. She has a 5.00 pack-year smoking history. She has never used smokeless tobacco.      Appropriate preventive services were discussed with this patient, including applicable screening as appropriate for cardiovascular disease, diabetes, osteopenia/osteoporosis, and glaucoma.  As appropriate for age/gender, discussed screening for colorectal cancer, prostate cancer, breast cancer, and cervical cancer. Checklist reviewing preventive services available has been given to the patient.    Reviewed patients plan of care and provided an AVS. The Basic Care Plan (routine screening as documented in Health Maintenance) for Sammi meets the Care Plan requirement. This Care Plan has been established and reviewed with the Patient.    Counseling Resources:  ATP IV Guidelines  Pooled Cohorts Equation Calculator  Breast Cancer Risk Calculator  Breast Cancer: Medication to Reduce Risk  FRAX Risk Assessment  ICSI Preventive Guidelines  Dietary Guidelines for Americans, 2010  [employer]'s MyPlate  ASA Prophylaxis  Lung CA Screening    Jeana Al MD  [facility]    Identified Health Risks:  "

## 2021-10-22 LAB — DEPRECATED CALCIDIOL+CALCIFEROL SERPL-MC: 47 UG/L (ref 30–80)

## 2021-11-02 ENCOUNTER — HOSPITAL ENCOUNTER (OUTPATIENT)
Dept: MRI IMAGING | Facility: CLINIC | Age: 71
Discharge: HOME | End: 2021-11-02
Attending: INTERNAL MEDICINE | Admitting: INTERNAL MEDICINE
Payer: MEDICARE

## 2021-11-02 DIAGNOSIS — R10.32 LEFT GROIN PAIN: ICD-10-CM

## 2021-11-02 PROCEDURE — G1004 CDSM NDSC: HCPCS

## 2021-11-05 ENCOUNTER — MYC MEDICAL ADVICE (OUTPATIENT)
Dept: INTERNAL MEDICINE | Facility: CLINIC | Age: 71
End: 2021-11-05
Payer: MEDICARE

## 2021-11-05 DIAGNOSIS — R10.31 RIGHT GROIN PAIN: Primary | ICD-10-CM

## 2021-11-18 LAB
GLUCOSE (EXTERNAL): 96 MG/DL (ref 65–140)
HBA1C MFR BLD: 6.1 %

## 2021-11-19 ENCOUNTER — TRANSFERRED RECORDS (OUTPATIENT)
Dept: HEALTH INFORMATION MANAGEMENT | Facility: CLINIC | Age: 71
End: 2021-11-19
Payer: MEDICARE

## 2021-11-22 ENCOUNTER — HOSPITAL ENCOUNTER (OUTPATIENT)
Dept: CT IMAGING | Facility: CLINIC | Age: 71
Discharge: HOME | End: 2021-11-22
Attending: INTERNAL MEDICINE | Admitting: INTERNAL MEDICINE
Payer: MEDICARE

## 2021-11-22 DIAGNOSIS — R10.31 RIGHT GROIN PAIN: ICD-10-CM

## 2021-11-22 PROCEDURE — 250N000011 HC RX IP 250 OP 636: Performed by: INTERNAL MEDICINE

## 2021-11-22 PROCEDURE — G1004 CDSM NDSC: HCPCS

## 2021-11-22 RX ORDER — IOPAMIDOL 755 MG/ML
100 INJECTION, SOLUTION INTRAVASCULAR ONCE
Status: COMPLETED | OUTPATIENT
Start: 2021-11-22 | End: 2021-11-22

## 2021-11-22 RX ADMIN — IOPAMIDOL 100 ML: 755 INJECTION, SOLUTION INTRAVENOUS at 14:48

## 2021-11-28 ENCOUNTER — MYC MEDICAL ADVICE (OUTPATIENT)
Dept: INTERNAL MEDICINE | Facility: CLINIC | Age: 71
End: 2021-11-28
Payer: MEDICARE

## 2021-11-28 DIAGNOSIS — M54.50 BILATERAL LOW BACK PAIN WITHOUT SCIATICA, UNSPECIFIED CHRONICITY: Primary | ICD-10-CM

## 2021-12-13 NOTE — TELEPHONE ENCOUNTER
RECORDS RECEIVED FROM: Fecal Incontinence   Appt date: 1/17/2022   NOTES STATUS DETAILS   OFFICE NOTE from referring provider  Internal [facility]:  10/21/21 - PCC OV with Dr. Al   OFFICE NOTE from other specialist   N/A    DISCHARGE SUMMARY from hospital  N/A    DISCHARGE REPORT from the [facility]:  2/6/16 - ED OV with Dr. Mackey   OPERATIVE REPORT  Internal [facility]:  12/12/16 - OP Note for ROBOTIC TOTAL LAPAROSCOPIC HYSTERECTOMY, BILATERAL SALPINGO-OOPHORECTOMY, CYSTOSCOPY with Dr. Ramirez   MEDICATION LIST Internal    LABS     PFC TESTING N/A    ANAL PAP N/A    BIOPSIES/PATHOLOGY RELATED TO DIAGNOSIS N/A    DIAGNOSTIC PROCEDURES     COLONOSCOPY Received [facility]:  4/20/15 - Colonoscopy   UPPER ENDOSCOPY (EGD) Received [facility]:  2/27/15 - EGD   FLEX SIGMOIDOSCOPY  N/A    ERCP N/A    IMAGING (DISC & REPORT)      CT  Received / Internal [facility]:  11/22/21 - CT Abd/Pelvis    [facility]:  2/6/16 - CT Abd/Pelvis   MRI N/A    XRAY N/A    ULTRASOUND (ENDOANAL/ENDORECTAL) Internal [facility]:  9/14/16 - US Pelvic     Records Requested  12/13/21    Facility  [facility]  Fax: [phone]    Outcome * 12/13/21 1:54 PM Faxed req to [facility] for images to be pushed to [facility]. - Debra    * 12/27/21 6:22 AM Images received from [facility] and attached to the patient in PACs. Shukri Richardson

## 2021-12-16 ENCOUNTER — E-VISIT (OUTPATIENT)
Dept: INTERNAL MEDICINE | Facility: CLINIC | Age: 71
End: 2021-12-16
Payer: MEDICARE

## 2021-12-16 DIAGNOSIS — R59.9 ENLARGED LYMPH NODES: Primary | ICD-10-CM

## 2021-12-16 PROCEDURE — 99421 OL DIG E/M SVC 5-10 MIN: CPT | Performed by: INTERNAL MEDICINE

## 2021-12-17 ENCOUNTER — TELEPHONE (OUTPATIENT)
Dept: INTERNAL MEDICINE | Facility: CLINIC | Age: 71
End: 2021-12-17
Payer: MEDICARE

## 2021-12-17 DIAGNOSIS — Z92.29 PERSONAL HISTORY OF OTHER DRUG THERAPY: ICD-10-CM

## 2021-12-17 DIAGNOSIS — M81.0 AGE-RELATED OSTEOPOROSIS WITHOUT CURRENT PATHOLOGICAL FRACTURE: Primary | ICD-10-CM

## 2021-12-17 DIAGNOSIS — R59.9 ENLARGED LYMPH NODES: ICD-10-CM

## 2021-12-17 NOTE — TELEPHONE ENCOUNTER
Provider E-Visit time total (minutes): 10 min   Had lyme disease . Neck lymph node appears to be enlarged . Reassured her that unlikely due to lyme   Recommend empiric augmentin . If symptoms persist will need to be seen and need US neck

## 2021-12-17 NOTE — TELEPHONE ENCOUNTER
Reason for call:  Other   Patient called regarding (reason for call): pt needs prolia injection every 6 months and pt currently doesn't have an order in the system for the injection.    Additional comments: please put in order for prolia injection so pt can schedule appointment     Phone number to reach patient:  Cell number on file:    Telephone Information:   Mobile [phone]       Best Time:  anytime    Can we leave a detailed message on this number?  YES    Travel screening: Not Applicable

## 2021-12-21 ENCOUNTER — HOSPITAL ENCOUNTER (OUTPATIENT)
Dept: MRI IMAGING | Facility: CLINIC | Age: 71
Discharge: HOME | End: 2021-12-21
Attending: INTERNAL MEDICINE | Admitting: INTERNAL MEDICINE
Payer: MEDICARE

## 2021-12-21 DIAGNOSIS — M54.50 BILATERAL LOW BACK PAIN WITHOUT SCIATICA, UNSPECIFIED CHRONICITY: ICD-10-CM

## 2021-12-21 PROCEDURE — 255N000002 HC RX 255 OP 636: Performed by: INTERNAL MEDICINE

## 2021-12-21 PROCEDURE — A9585 GADOBUTROL INJECTION: HCPCS | Performed by: INTERNAL MEDICINE

## 2021-12-21 PROCEDURE — 72158 MRI LUMBAR SPINE W/O & W/DYE: CPT | Mod: ME

## 2021-12-21 RX ORDER — GADOBUTROL 604.72 MG/ML
5 INJECTION INTRAVENOUS ONCE
Status: COMPLETED | OUTPATIENT
Start: 2021-12-21 | End: 2021-12-21

## 2021-12-21 RX ADMIN — GADOBUTROL 5 ML: 604.72 INJECTION INTRAVENOUS at 14:22

## 2021-12-27 ENCOUNTER — TELEPHONE (OUTPATIENT)
Dept: INTERNAL MEDICINE | Facility: CLINIC | Age: 71
End: 2021-12-27
Payer: MEDICARE

## 2021-12-27 NOTE — TELEPHONE ENCOUNTER
Reason for Call:  Other appointment    Detailed comments: f/u staff infection has questions.     Phone Number Patient can be reached at: Home number on file [phone] (home)    Best Time: any    Can we leave a detailed message on this number? YES    Call taken on 12/27/2021 at 3:08 PM by Jannette Chandler

## 2021-12-27 NOTE — TELEPHONE ENCOUNTER
Patient was unaware that DR. Al is on an extended vacation.  She took provider's first available appointment.

## 2022-01-17 ENCOUNTER — PRE VISIT (OUTPATIENT)
Dept: SURGERY | Facility: CLINIC | Age: 72
End: 2022-01-17

## 2022-01-24 ENCOUNTER — OFFICE VISIT (OUTPATIENT)
Dept: SURGERY | Facility: CLINIC | Age: 72
End: 2022-01-24
Attending: INTERNAL MEDICINE
Payer: MEDICARE

## 2022-01-24 VITALS
HEIGHT: 63 IN | SYSTOLIC BLOOD PRESSURE: 125 MMHG | BODY MASS INDEX: 18 KG/M2 | OXYGEN SATURATION: 99 % | DIASTOLIC BLOOD PRESSURE: 73 MMHG | WEIGHT: 101.6 LBS | HEART RATE: 84 BPM

## 2022-01-24 DIAGNOSIS — R15.9 INCONTINENCE OF FECES, UNSPECIFIED FECAL INCONTINENCE TYPE: ICD-10-CM

## 2022-01-24 PROCEDURE — 99203 OFFICE O/P NEW LOW 30 MIN: CPT | Performed by: NURSE PRACTITIONER

## 2022-01-24 ASSESSMENT — MIFFLIN-ST. JEOR: SCORE: 954.98

## 2022-01-24 ASSESSMENT — PAIN SCALES - GENERAL: PAINLEVEL: NO PAIN (0)

## 2022-01-24 NOTE — LETTER
"2022       RE: Sammi Guadalupe  [address] [place] [address]     Dear Colleague,    Thank you for referring your patient, Sammi Guadalupe, to the [facility] COLON [facility] [place] [facility]. Please see a copy of my visit note below.    Colon and Rectal Surgery Consult Clinic Note    Date: 2022     Referring provider:  Jeana Al MD  [facility] [place] [address]     RE: Sammi Guadalupe  : 1950  ALEJO: 2022    Sammi Guadalupe is a very pleasant 71 year old female with a significant past medical history of breast cancer s/p bilateral mastectomy, systemic chemotherapy, and tamoxifen and aromatase inhibitor use, total hysterectomy, and T1DM who presents for evaluation of fecal incontinence     Pt has had fecal incontinence for 5+ years that she said started during use of aromatase inhibitors. She has leakage of small amounts of liquid/pasty stool when standing and walking which happens 4-5 times a week. She is not incontinent during sleep or while sitting. She does not notice when the stool leaks. She has not noticed any blood in her stools. She reports a long history of constipation and says she has been more constipated since 2021. She had one pregnancy that ended in an  and reports occasional leakage of urine with sneezing. She has never splinted to improve constipation. She reports no leg weakness or numbness.    Physical Examination:  /73 (BP Location: Left arm, Patient Position: Sitting, Cuff Size: Adult Regular)   Pulse 84   Ht 5' 3\"   Wt 101 lb 9.6 oz   SpO2 99%   BMI 18.00 kg/m    General: alert, well-appearing, not in any distress    Perianal external examination: Exam was chaperoned by Shai Correa, MS3  Perianal skin: Intact with no excoriation or lichenification.  Lesions: No.  Eversion of buttocks: There was not evidence of an anal fissure. " Details: N/A.  Skin tags or external hemorrhoids: No.    Digital rectal examination: Was performed.   Sphincter tone: Good.  Palpable lesions: a small stool ball was felt in the rectal vault.  Other: A small  rectocele was appreciated.  Bimanual examination: was not performed    Anoscopy: Was deferred    Procedures: none    Assessment/Plan: Sammi Guadalupe is a very pleasant 71 year old female with a significant past medical history of breast cancer s/p bilateral mastectomy, systemic chemotherapy, and tamoxifen and aromatase inhibitor use, total hysterectomy, and T1DM who presents for evaluation of fecal incontinence.We had a long discussion about pelvic floor dysfunction and fecal incontinence. We spoke about conservative treatment (stool bulkening) versus sacral nerve stimulator and generally about outcomes associated with these. Will first try to bulken stools with fiber (Citrucel/Benefiber). Will have the patient will see me if symptoms persists after 4-6 weeks. Patient's questions were answered to her stated satisfaction and she is in agreement with this plan.    Medical history:  Past Medical History:   Diagnosis Date     ASHD (arteriosclerotic heart disease) 2012    CT scan, calcium score 428 lad artery deployment of a non-drug-eluting stent in the LAD     DVT (deep venous thrombosis) (H)      Family history of myocardial infarction        Surgical history:  Past Surgical History:   Procedure Laterality Date     BACK SURGERY       IR MISCELLANEOUS PROCEDURE  1/27/2014     IR PORT REMOVAL  2/6/2015     LAMINECTOMY AND MICRODISCECTOMY CERVICAL SPINE N/A      LAPAROSCOPIC HYSTERECTOMY N/A 12/12/2016    Procedure: ROBOTIC TOTAL LAPAROSCOPIC HYSTERECTOMY, BILATERAL SALPINGO-OOPHORECTOMY, CYSTOSCOPY;  Surgeon: Sohan Ramirez MD;  [facility]: [facility];  Service:      MASTECTOMY Bilateral      RELEASE CARPAL TUNNEL Bilateral        Problem list:  Patient Active Problem List    Diagnosis Date Noted     Health  maintenance examination 01/03/2021     Priority: Medium     Colon 2015 next 2025  Dex 2020         Type 1 diabetes mellitus with complication (H) 03/14/2020     Priority: Medium     Since 2007 ( late onset )    initially put on metformin . And then out you on insulin          Insulin pump in place 03/27/2019     Priority: Medium     .Basal rates: units/hr  MN  0.4  (change to .35)  6am 0.2    11am 0.1   6pm 0.225  8pm to MN 0.375  Bolus:  Insulin to Carb ratio  at Midnite  1 unit(s) per 15 grams CHO  (change to   22)                                     at 11am      1  unit(s) per 15 grams CHO      (change to 22)                                             at  5pm to MN    1      unit(s) per 12 grams   CHO   (change  to 22)  Sensitivity  67  glucose target range  100-100  Active Insulin Time  4         Adverse effect of other drugs, medicaments and biological substances, sequela 11/26/2018     Priority: Medium     Diabetes mellitus type I, controlled (H) 09/24/2018     Priority: Medium     S/P coronary artery stent placement 09/24/2018     Priority: Medium     2012 single stent . Dr Gipson . Presented angina         Age-related osteoporosis without current pathological fracture 07/17/2018     Priority: Medium     Was on fosamax for 5 years , now on prolia          Mixed hyperlipidemia 10/10/2016     Priority: Medium     Hypertension      Priority: Medium     Created by Conversion  Replacement Utility updated for latest IMO load         Coronary Artery Disease      Priority: Medium     Created by Conversion  Replacement Utility updated for latest IMO load         Swelling of arm 11/17/2014     Priority: Medium     DVT (deep venous thrombosis) (H) 11/17/2014     Priority: Medium     Breast cancer, right breast (H) 06/03/2014     Priority: Medium     Malignant neoplasm of lower-outer quadrant of female breast (H) 05/20/2014     Priority: Medium     S/p bilateral mastectomy .    then had chemotherapy , is still on  tamoxifen going t stop tamoxifen this   year .            Medications:  Current Outpatient Medications   Medication Sig Dispense Refill     aspirin 81 MG tablet [ASPIRIN 81 MG TABLET] Take 81 mg by mouth daily.       atorvastatin (LIPITOR) 40 MG tablet Take 1 tablet (40 mg) by mouth At Bedtime 90 tablet 3     b complex vitamins tablet [B COMPLEX VITAMINS TABLET] Take 1 tablet by mouth daily.       blood glucose test strips [BLOOD GLUCOSE TEST STRIPS] Dispense item covered by pt ins. Test 6 times a day       blood-glucose sensor (DEXCOM G6 SENSOR) Teresa [BLOOD-GLUCOSE SENSOR (DEXCOM G6 SENSOR) TERESA] USE AS DIRECTED FOR CONTINUOUS GLUCOSE MONITORING CHANGE EVERY 10 DAYS       calcium carbonate (OS-PATTI) 600 mg (1,500 mg) tablet [CALCIUM CARBONATE (OS-PATTI) 600 MG (1,500 MG) TABLET] Take 1,200 mg by mouth bedtime. Plus magnesium        cholecalciferol, vitamin D3, (VITAMIN D3) 2,000 unit cap [CHOLECALCIFEROL, VITAMIN D3, (VITAMIN D3) 2,000 UNIT CAP] Take 2,000 Units by mouth bedtime.        insulin lispro (HUMALOG) 100 unit/mL injection [INSULIN LISPRO (HUMALOG) 100 UNIT/ML INJECTION] 20 Units daily.       irbesartan-hydrochlorothiazide (AVALIDE) 150-12.5 MG tablet Take 1 tablet by mouth daily 90 tablet 3     lysine 500 mg Tab [LYSINE 500 MG TAB] Take 500 mg by mouth 2 (two) times a day.       multivitamin therapeutic tablet [MULTIVITAMIN THERAPEUTIC TABLET] Take 1 tablet by mouth daily.       amoxicillin-clavulanate (AUGMENTIN) 875-125 MG tablet Take 1 tablet by mouth 2 times daily (Patient not taking: Reported on 1/24/2022) 14 tablet 0       Allergies:  Allergies   Allergen Reactions     Nitroglycerin Other (See Comments)     Blood Pressure drops significantly.     Sulfa (Sulfonamide Antibiotics) [Sulfa Drugs] Dizziness     Dizziness and doesn't feel normal.     Metoprolol Other (See Comments)     Fatigue and cold       Family history:  Family History   Problem Relation Age of Onset     Cancer Mother      Coronary  "Artery Disease Mother      Cancer Father      Heart Disease Father      CABG Father      Heart Disease Sister        Social history:  Social History     Tobacco Use     Smoking status: Former Smoker     Packs/day: 0.50     Years: 10.00     Pack years: 5.00     Smokeless tobacco: Never Used   Substance Use Topics     Alcohol use: Yes     Alcohol/week: 5.0 standard drinks    Marital status: single.  Occupation: retired.    Nursing Notes:   Ada Mckeon, EMT  1/24/2022  3:17 PM  Signed  Chief Complaint   Patient presents with     New Patient     Fecal incontinence       Vitals:    01/24/22 1514   BP: 125/73   BP Location: Left arm   Patient Position: Sitting   Cuff Size: Adult Regular   Pulse: 84   SpO2: 99%   Weight: 101 lb 9.6 oz   Height: 5' 3\"       Body mass index is 18 kg/m .    Ada Mckeon, JEREMÍAS      30 minutes spent on the date of the encounter doing chart review, history and exam, documentation and further activities as noted above.     TASHA Guzman, NP-C  Colon and Rectal Surgery   [facility]    This note was created using speech recognition software and may contain unintended word substitutions.        "

## 2022-01-24 NOTE — PROGRESS NOTES
"Colon and Rectal Surgery Consult Clinic Note    Date: 2022     Referring provider:  Jeana Al MD  [address] [place] [address]     RE: Smami Guadalupe  : 1950  ALEJO: 2022    Sammi Guadalupe is a very pleasant 71 year old female with a significant past medical history of breast cancer s/p bilateral mastectomy, systemic chemotherapy, and tamoxifen and aromatase inhibitor use, total hysterectomy, and T1DM who presents for evaluation of fecal incontinence     Pt has had fecal incontinence for 5+ years that she said started during use of aromatase inhibitors. She has leakage of small amounts of liquid/pasty stool when standing and walking which happens 4-5 times a week. She is not incontinent during sleep or while sitting. She does not notice when the stool leaks. She has not noticed any blood in her stools. She reports a long history of constipation and says she has been more constipated since 2021. She had one pregnancy that ended in an  and reports occasional leakage of urine with sneezing. She has never splinted to improve constipation. She reports no leg weakness or numbness.    Physical Examination:  /73 (BP Location: Left arm, Patient Position: Sitting, Cuff Size: Adult Regular)   Pulse 84   Ht 5' 3\"   Wt 101 lb 9.6 oz   SpO2 99%   BMI 18.00 kg/m    General: alert, well-appearing, not in any distress    Perianal external examination: Exam was chaperoned by Shai Correa, MS3  Perianal skin: Intact with no excoriation or lichenification.  Lesions: No.  Eversion of buttocks: There was not evidence of an anal fissure. Details: N/A.  Skin tags or external hemorrhoids: No.    Digital rectal examination: Was performed.   Sphincter tone: Good.  Palpable lesions: a small stool ball was felt in the rectal vault.  Other: A small  rectocele was appreciated.  Bimanual examination: was not performed    Anoscopy: Was deferred    Procedures: " none    Assessment/Plan: Sammi Guadalupe is a very pleasant 71 year old female with a significant past medical history of breast cancer s/p bilateral mastectomy, systemic chemotherapy, and tamoxifen and aromatase inhibitor use, total hysterectomy, and T1DM who presents for evaluation of fecal incontinence.We had a long discussion about pelvic floor dysfunction and fecal incontinence. We spoke about conservative treatment (stool bulkening) versus sacral nerve stimulator and generally about outcomes associated with these. Will first try to bulken stools with fiber (Citrucel/Benefiber). Will have the patient will see me if symptoms persists after 4-6 weeks. Patient's questions were answered to her stated satisfaction and she is in agreement with this plan.    Medical history:  Past Medical History:   Diagnosis Date     ASHD (arteriosclerotic heart disease) 2012    CT scan, calcium score 428 lad artery deployment of a non-drug-eluting stent in the LAD     DVT (deep venous thrombosis) (H)      Family history of myocardial infarction        Surgical history:  Past Surgical History:   Procedure Laterality Date     BACK SURGERY       IR MISCELLANEOUS PROCEDURE  1/27/2014     IR PORT REMOVAL  2/6/2015     LAMINECTOMY AND MICRODISCECTOMY CERVICAL SPINE N/A      LAPAROSCOPIC HYSTERECTOMY N/A 12/12/2016    Procedure: ROBOTIC TOTAL LAPAROSCOPIC HYSTERECTOMY, BILATERAL SALPINGO-OOPHORECTOMY, CYSTOSCOPY;  Surgeon: Sohan Ramirez MD;  Location: [facility];  Service:      MASTECTOMY Bilateral      RELEASE CARPAL TUNNEL Bilateral        Problem list:  Patient Active Problem List    Diagnosis Date Noted     Health maintenance examination 01/03/2021     Priority: Medium     Colon 2015 next 2025  Dex 2020         Type 1 diabetes mellitus with complication (H) 03/14/2020     Priority: Medium     Since 2007 ( late onset )    initially put on metformin . And then out you on insulin          Insulin pump in place 03/27/2019      Priority: Medium     .Basal rates: units/hr  [place]  0.4  (change to .35)  6am 0.2    11am 0.1   6pm 0.225  8pm to [place] 0.375  Bolus:  Insulin to Carb ratio  at Midnite  1 unit(s) per 15 grams CHO  (change to   22)                                     at 11am      1  unit(s) per 15 grams CHO      (change to 22)                                             at  5pm to [place]    1      unit(s) per 12 grams   CHO   (change  to 22)  Sensitivity  67  glucose target range  100-100  Active Insulin Time  4         Adverse effect of other drugs, medicaments and biological substances, sequela 11/26/2018     Priority: Medium     Diabetes mellitus type I, controlled (H) 09/24/2018     Priority: Medium     S/P coronary artery stent placement 09/24/2018     Priority: Medium     2012 single stent . Dr Gipson . Presented angina         Age-related osteoporosis without current pathological fracture 07/17/2018     Priority: Medium     Was on fosamax for 5 years , now on prolia          Mixed hyperlipidemia 10/10/2016     Priority: Medium     Hypertension      Priority: Medium     Created by Conversion  Replacement Utility updated for latest IMO load         Coronary Artery Disease      Priority: Medium     Created by Conversion  Replacement Utility updated for latest IMO load         Swelling of arm 11/17/2014     Priority: Medium     DVT (deep venous thrombosis) (H) 11/17/2014     Priority: Medium     Breast cancer, right breast (H) 06/03/2014     Priority: Medium     Malignant neoplasm of lower-outer quadrant of female breast (H) 05/20/2014     Priority: Medium     S/p bilateral mastectomy .    then had chemotherapy , is still on tamoxifen going t stop tamoxifen this   year .            Medications:  Current Outpatient Medications   Medication Sig Dispense Refill     aspirin 81 MG tablet [ASPIRIN 81 MG TABLET] Take 81 mg by mouth daily.       atorvastatin (LIPITOR) 40 MG tablet Take 1 tablet (40 mg) by mouth At Bedtime 90 tablet 3     b  complex vitamins tablet [B COMPLEX VITAMINS TABLET] Take 1 tablet by mouth daily.       blood glucose test strips [BLOOD GLUCOSE TEST STRIPS] Dispense item covered by pt ins. Test 6 times a day       blood-glucose sensor (DEXCOM G6 SENSOR) Teresa [BLOOD-GLUCOSE SENSOR (DEXCOM G6 SENSOR) TERESA] USE AS DIRECTED FOR CONTINUOUS GLUCOSE MONITORING CHANGE EVERY 10 DAYS       calcium carbonate (OS-PATTI) 600 mg (1,500 mg) tablet [CALCIUM CARBONATE (OS-PATTI) 600 MG (1,500 MG) TABLET] Take 1,200 mg by mouth bedtime. Plus magnesium        cholecalciferol, vitamin D3, (VITAMIN D3) 2,000 unit cap [CHOLECALCIFEROL, VITAMIN D3, (VITAMIN D3) 2,000 UNIT CAP] Take 2,000 Units by mouth bedtime.        insulin lispro (HUMALOG) 100 unit/mL injection [INSULIN LISPRO (HUMALOG) 100 UNIT/ML INJECTION] 20 Units daily.       irbesartan-hydrochlorothiazide (AVALIDE) 150-12.5 MG tablet Take 1 tablet by mouth daily 90 tablet 3     lysine 500 mg Tab [LYSINE 500 MG TAB] Take 500 mg by mouth 2 (two) times a day.       multivitamin therapeutic tablet [MULTIVITAMIN THERAPEUTIC TABLET] Take 1 tablet by mouth daily.       amoxicillin-clavulanate (AUGMENTIN) 875-125 MG tablet Take 1 tablet by mouth 2 times daily (Patient not taking: Reported on 1/24/2022) 14 tablet 0       Allergies:  Allergies   Allergen Reactions     Nitroglycerin Other (See Comments)     Blood Pressure drops significantly.     Sulfa (Sulfonamide Antibiotics) [Sulfa Drugs] Dizziness     Dizziness and doesn't feel normal.     Metoprolol Other (See Comments)     Fatigue and cold       Family history:  Family History   Problem Relation Age of Onset     Cancer Mother      Coronary Artery Disease Mother      Cancer Father      Heart Disease Father      CABG Father      Heart Disease Sister        Social history:  Social History     Tobacco Use     Smoking status: Former Smoker     Packs/day: 0.50     Years: 10.00     Pack years: 5.00     Smokeless tobacco: Never Used   Substance Use Topics      "Alcohol use: Yes     Alcohol/week: 5.0 standard drinks    Marital status: single.  Occupation: retired.    Nursing Notes:   Ada Mckeon, EMT  1/24/2022  3:17 PM  Signed  Chief Complaint   Patient presents with     New Patient     Fecal incontinence       Vitals:    01/24/22 1514   BP: 125/73   BP Location: Left arm   Patient Position: Sitting   Cuff Size: Adult Regular   Pulse: 84   SpO2: 99%   Weight: 101 lb 9.6 oz   Height: 5' 3\"       Body mass index is 18 kg/m .                          Ada Mckeon, JEREMÍAS         30 minutes spent on the date of the encounter doing chart review, history and exam, documentation and further activities as noted above.     TASHA Guzman, NP-C  Colon and Rectal Surgery   [facility]    This note was created using speech recognition software and may contain unintended word substitutions.    "

## 2022-01-24 NOTE — NURSING NOTE
"Chief Complaint   Patient presents with     New Patient     Fecal incontinence       Vitals:    01/24/22 1514   BP: 125/73   BP Location: Left arm   Patient Position: Sitting   Cuff Size: Adult Regular   Pulse: 84   SpO2: 99%   Weight: 101 lb 9.6 oz   Height: 5' 3\"       Body mass index is 18 kg/m .                          Ada Mckeon, EMT    "

## 2022-01-24 NOTE — PATIENT INSTRUCTIONS
1) Start a daily fiber supplement such as Citrucel or Metamucil. Start with once a day and slowly increase up to three times a day, if needed, over the next 4-6 weeks  2) We had a long discussion about pelvic floor dysfunction and fecal incontinence. We spoke about conservative treatment (stool bulkening) versus sacral nerve stimulator and generally about outcomes associated with these.   3) Return to clinic after 6 weeks if symptoms persist.

## 2022-01-28 ENCOUNTER — ALLIED HEALTH/NURSE VISIT (OUTPATIENT)
Dept: FAMILY MEDICINE | Facility: CLINIC | Age: 72
End: 2022-01-28
Payer: MEDICARE

## 2022-01-28 DIAGNOSIS — M81.0 AGE-RELATED OSTEOPOROSIS WITHOUT CURRENT PATHOLOGICAL FRACTURE: Primary | ICD-10-CM

## 2022-01-28 PROCEDURE — 96372 THER/PROPH/DIAG INJ SC/IM: CPT | Performed by: INTERNAL MEDICINE

## 2022-01-28 PROCEDURE — 99207 PR NO CHARGE NURSE ONLY: CPT

## 2022-01-31 ENCOUNTER — OFFICE VISIT (OUTPATIENT)
Dept: INTERNAL MEDICINE | Facility: CLINIC | Age: 72
End: 2022-01-31
Payer: MEDICARE

## 2022-01-31 VITALS
HEART RATE: 64 BPM | SYSTOLIC BLOOD PRESSURE: 110 MMHG | BODY MASS INDEX: 17.89 KG/M2 | OXYGEN SATURATION: 99 % | DIASTOLIC BLOOD PRESSURE: 69 MMHG | WEIGHT: 101 LBS

## 2022-01-31 DIAGNOSIS — C50.511 MALIGNANT NEOPLASM OF LOWER-OUTER QUADRANT OF RIGHT BREAST OF FEMALE, ESTROGEN RECEPTOR POSITIVE (H): ICD-10-CM

## 2022-01-31 DIAGNOSIS — R10.30 INGUINAL PAIN, UNSPECIFIED LATERALITY: Primary | ICD-10-CM

## 2022-01-31 DIAGNOSIS — E10.8 TYPE 1 DIABETES MELLITUS WITH COMPLICATION (H): ICD-10-CM

## 2022-01-31 DIAGNOSIS — Z17.0 MALIGNANT NEOPLASM OF LOWER-OUTER QUADRANT OF RIGHT BREAST OF FEMALE, ESTROGEN RECEPTOR POSITIVE (H): ICD-10-CM

## 2022-01-31 LAB
ALBUMIN UR-MCNC: NEGATIVE MG/DL
APPEARANCE UR: CLEAR
BILIRUB UR QL STRIP: NEGATIVE
C REACTIVE PROTEIN LHE: <0.1 MG/DL (ref 0–0.8)
COLOR UR AUTO: YELLOW
ERYTHROCYTE [SEDIMENTATION RATE] IN BLOOD BY WESTERGREN METHOD: 2 MM/HR (ref 0–20)
GLUCOSE UR STRIP-MCNC: NEGATIVE MG/DL
HGB UR QL STRIP: NEGATIVE
KETONES UR STRIP-MCNC: NEGATIVE MG/DL
LEUKOCYTE ESTERASE UR QL STRIP: NEGATIVE
NITRATE UR QL: NEGATIVE
PH UR STRIP: 6 [PH] (ref 5–8)
SP GR UR STRIP: 1.01 (ref 1–1.03)
UROBILINOGEN UR STRIP-ACNC: 0.2 E.U./DL

## 2022-01-31 PROCEDURE — 36415 COLL VENOUS BLD VENIPUNCTURE: CPT | Performed by: INTERNAL MEDICINE

## 2022-01-31 PROCEDURE — 86140 C-REACTIVE PROTEIN: CPT | Performed by: INTERNAL MEDICINE

## 2022-01-31 PROCEDURE — 99214 OFFICE O/P EST MOD 30 MIN: CPT | Performed by: INTERNAL MEDICINE

## 2022-01-31 PROCEDURE — 81003 URINALYSIS AUTO W/O SCOPE: CPT | Performed by: INTERNAL MEDICINE

## 2022-01-31 PROCEDURE — 85652 RBC SED RATE AUTOMATED: CPT | Performed by: INTERNAL MEDICINE

## 2022-02-01 NOTE — PROGRESS NOTES
Assessment & Plan     Type 1 diabetes mellitus with complication (H)  On an insulin pump excellent control     Inguinal pain, unspecified laterality  Seemed to clear up after taking Augmentin for cervical lymphadenopathy   MRI hip , lumbar spine MRI and CT abdomen pelvis did not reveal any hernias , or suspicious   - ESR: Erythrocyte sedimentation rate  - CRP, inflammation  - UA Macro with Reflex to Micro and Culture - lab collect  - ESR: Erythrocyte sedimentation rate  - CRP, inflammation  - UA Macro with Reflex to Micro and Culture - lab collect    Malignant neoplasm of lower-outer quadrant of right breast of female, estrogen receptor positive (H)  Reassured her that no evidence of metastases . There is no discernible cervical lymphadenopathy on exam so I do not see the need for                    No follow-ups on file.    Jeana Al MD  [facility]   Sammi is a 71 year old who presents for the following health issues follow up on bilateral groin pain and cervical lympadenitis . She was given augmentin for ten days and had imaging . She said the groing pain improved     HPI   Patient Active Problem List   Diagnosis     Hypertension     Coronary Artery Disease     Malignant neoplasm of lower-outer quadrant of female breast (H)     Breast cancer, right breast (H)     Swelling of arm     DVT (deep venous thrombosis) (H)     Mixed hyperlipidemia     Age-related osteoporosis without current pathological fracture     Diabetes mellitus type I, controlled (H)     S/P coronary artery stent placement     Adverse effect of other drugs, medicaments and biological substances, sequela     Insulin pump in place     Type 1 diabetes mellitus with complication (H)     Health maintenance examination     Current Outpatient Medications   Medication     aspirin 81 MG tablet     atorvastatin (LIPITOR) 40 MG tablet     b complex vitamins tablet     blood glucose test strips      blood-glucose sensor (DEXCOM G6 SENSOR) Lida     calcium carbonate (OS-PATTI) 600 mg (1,500 mg) tablet     cholecalciferol, vitamin D3, (VITAMIN D3) 2,000 unit cap     insulin lispro (HUMALOG) 100 unit/mL injection     irbesartan-hydrochlorothiazide (AVALIDE) 150-12.5 MG tablet     lysine 500 mg Tab     multivitamin therapeutic tablet     amoxicillin-clavulanate (AUGMENTIN) 875-125 MG tablet     Current Facility-Administered Medications   Medication     denosumab (PROLIA) injection 60 mg             Review of Systems   Constitutional, HEENT, cardiovascular, pulmonary, gi and gu systems are negative, except as otherwise noted.      Objective    /69 (BP Location: Left arm, Patient Position: Sitting, Cuff Size: Adult Regular)   Pulse 64   Wt 45.8 kg (101 lb)   SpO2 99%   BMI 17.89 kg/m    Body mass index is 17.89 kg/m .  Physical Exam   GENERAL: healthy, alert and no distress  NECK: no adenopathy, no asymmetry, masses, or scars and thyroid normal to palpation  RESP: lungs clear to auscultation - no rales, rhonchi or wheezes  CV: regular rate and rhythm, normal S1 S2, no S3 or S4, no murmur, click or rub, no peripheral edema and peripheral pulses strong  ABDOMEN: soft, nontender, no hepatosplenomegaly, no masses and bowel sounds normal  MS: no gross musculoskeletal defects noted, no edema

## 2022-04-25 ENCOUNTER — TRANSFERRED RECORDS (OUTPATIENT)
Dept: HEALTH INFORMATION MANAGEMENT | Facility: CLINIC | Age: 72
End: 2022-04-25

## 2022-04-25 LAB — RETINOPATHY: NEGATIVE

## 2022-04-28 ENCOUNTER — NURSE TRIAGE (OUTPATIENT)
Dept: NURSING | Facility: CLINIC | Age: 72
End: 2022-04-28
Payer: MEDICARE

## 2022-04-28 ENCOUNTER — OFFICE VISIT (OUTPATIENT)
Dept: FAMILY MEDICINE | Facility: CLINIC | Age: 72
End: 2022-04-28
Payer: MEDICARE

## 2022-04-28 VITALS
HEART RATE: 63 BPM | WEIGHT: 104.4 LBS | TEMPERATURE: 97.8 F | DIASTOLIC BLOOD PRESSURE: 83 MMHG | BODY MASS INDEX: 18.49 KG/M2 | RESPIRATION RATE: 16 BRPM | SYSTOLIC BLOOD PRESSURE: 137 MMHG | OXYGEN SATURATION: 100 %

## 2022-04-28 DIAGNOSIS — R35.0 URINARY FREQUENCY: Primary | ICD-10-CM

## 2022-04-28 LAB
ALBUMIN UR-MCNC: NEGATIVE MG/DL
APPEARANCE UR: CLEAR
BACTERIA #/AREA URNS HPF: ABNORMAL /HPF
BILIRUB UR QL STRIP: NEGATIVE
COLOR UR AUTO: YELLOW
GLUCOSE UR STRIP-MCNC: NEGATIVE MG/DL
HGB UR QL STRIP: ABNORMAL
KETONES UR STRIP-MCNC: NEGATIVE MG/DL
LEUKOCYTE ESTERASE UR QL STRIP: NEGATIVE
NITRATE UR QL: NEGATIVE
PH UR STRIP: 7 [PH] (ref 5–8)
RBC #/AREA URNS AUTO: ABNORMAL /HPF
SP GR UR STRIP: 1.01 (ref 1–1.03)
SQUAMOUS #/AREA URNS AUTO: ABNORMAL /LPF
UROBILINOGEN UR STRIP-ACNC: 0.2 E.U./DL
WBC #/AREA URNS AUTO: ABNORMAL /HPF

## 2022-04-28 PROCEDURE — 81001 URINALYSIS AUTO W/SCOPE: CPT | Performed by: FAMILY MEDICINE

## 2022-04-28 PROCEDURE — 99213 OFFICE O/P EST LOW 20 MIN: CPT | Performed by: FAMILY MEDICINE

## 2022-04-28 NOTE — TELEPHONE ENCOUNTER
Patient is calling and states that she is urinating more frequently.  Patient is noticing blood in her urine.  Patient denies pain with urination.  Patient denies fever cough and shortness of breath.  Patient states that she will go to walk in clinic.    COVID 19 Nurse Triage Plan/Patient Instructions    Please be aware that novel coronavirus (COVID-19) may be circulating in the community. If you develop symptoms such as fever, cough, or SOB or if you have concerns about the presence of another infection including coronavirus (COVID-19), please contact your health care provider or visit [email or web address].     Disposition/Instructions    In-Person Visit with provider recommended. Reference Visit Selection Guide.    Thank you for taking steps to prevent the spread of this virus.  o Limit your contact with others.  o Wear a simple mask to cover your cough.  o Wash your hands well and often.    Resources    [facility]: About COVID-19: [email or web address]    CDC: What to Do If You're Sick: www.cdc.gov/coronavirus/2019-ncov/about/steps-when-sick.html    CDC: Ending Home Isolation: www.cdc.gov/coronavirus/2019-ncov/hcp/disposition-in-home-patients.html     CDC: Caring for Someone: www.cdc.gov/coronavirus/2019-ncov/if-you-are-sick/care-for-someone.html     [facility]: Interim Guidance for Hospital Discharge to Home: www.health.[facility].[place]/diseases/coronavirus/hcp/hospdischarge.pdf    [facility] clinical trials (COVID-19 research studies): [email or web address]     Below are the COVID-19 hotlines at the [place] Department of Health ([facility]). Interpreters are available.   o For health questions: Call 840-343-5085 or 1-767.879.8739 (7 a.m. to 7 p.m.)  o For questions about schools and childcare: Call 689-817-8802 or 1-784.290.7683 (7 a.m. to 7 p.m.)                       Reason for Disposition    Urinating more frequently than usual (i.e., frequency)    Additional  Information    Negative: Shock suspected (e.g., cold/pale/clammy skin, too weak to stand, low BP, rapid pulse)    Negative: Sounds like a life-threatening emergency to the triager    Negative: Unable to urinate (or only a few drops) > 4 hours and bladder feels very full (e.g., palpable bladder or strong urge to urinate)    Negative: Decreased urination and drinking very little and dehydration suspected (e.g., dark urine, no urine > 12 hours, very dry mouth, very lightheaded)    Negative: Patient sounds very sick or weak to the triager    Negative: Fever > 100.4 F (38.0 C)    Negative: Side (flank) or lower back pain present    Negative: Can't control passage of urine (i.e., urinary incontinence) and new onset (< 2 weeks) or worsening    Protocols used: URINARY SYMPTOMS-A-OH

## 2022-04-28 NOTE — PATIENT INSTRUCTIONS
Your urinalysis was normal.  It does not appear at this time that you have a bladder infection.  Sometimes the urethra and bladder can get irritated causing similar symptoms.  I would recommend pushing fluids but avoid antibiotics at this time.  Please follow-up if your symptoms are getting worse or not improving over the next several days.    What is a bladder irritant?   A bladder irritant is any food, drink, or medication that causes the bladder to be irritated. Irritation can cause frequency (needing to urinate more often than normal), urgency (the sense of needing to urinate), bladder spasms, and even bladder pain. Bladder spasms can lead to urine leakage if there is a sudden urge, but not enough time to reach a toilet.     What are some examples of bladder irritants?   The following is a list of bladder irritants. The seven MOST IRRITATING are listed first:   *All alcoholic beverages   *Cigarettes/Tobacco   *Cola drinks   *Tea   *Artificial Sweeteners   *Chocolate   *Coffee     Other possible irritants include:   Fruits (and their juices):   cranberries, grapes, oranges, norris,   peaches, pineapple, plums, apples, and cantaloupe   Vegetables: onions, tomatoes, chilies, peppers   Milk/Dairy: aged cheese, sour cream, yogurt   Grains: rye & sourdough breads   Seasonings: spices & spicy food, especially peppers, acidic foods and beverages, walnuts &   peanuts, vinegar

## 2022-07-18 ENCOUNTER — TELEPHONE (OUTPATIENT)
Dept: PODIATRY | Facility: CLINIC | Age: 72
End: 2022-07-18

## 2022-07-18 DIAGNOSIS — E10.8 TYPE 1 DIABETES MELLITUS WITH COMPLICATION (H): Primary | ICD-10-CM

## 2022-07-18 NOTE — TELEPHONE ENCOUNTER
Refill sent, informed her she will need to see Dr. Welsh annually for refills. Informed her she can ask PCP to fill too.

## 2022-07-18 NOTE — TELEPHONE ENCOUNTER
Patient requesting orders for orthotics and would like this sent to [facility]. She is also wondering if/how often Dr. Welsh needs to see her in order to continue to get orthotics; patient was last seen in August 2021.

## 2022-07-26 ENCOUNTER — TELEPHONE (OUTPATIENT)
Dept: INTERNAL MEDICINE | Facility: CLINIC | Age: 72
End: 2022-07-26

## 2022-07-26 DIAGNOSIS — Z92.29 PERSONAL HISTORY OF OTHER DRUG THERAPY: ICD-10-CM

## 2022-07-26 DIAGNOSIS — M81.0 SENILE OSTEOPOROSIS: Primary | ICD-10-CM

## 2022-07-26 NOTE — TELEPHONE ENCOUNTER
Please sign pended prolia       FYI to CAM team it looks like it AUTH   But needing new order for MAR

## 2022-08-01 ENCOUNTER — ALLIED HEALTH/NURSE VISIT (OUTPATIENT)
Dept: FAMILY MEDICINE | Facility: CLINIC | Age: 72
End: 2022-08-01
Payer: MEDICARE

## 2022-08-01 DIAGNOSIS — M81.0 AGE-RELATED OSTEOPOROSIS WITHOUT CURRENT PATHOLOGICAL FRACTURE: Primary | ICD-10-CM

## 2022-08-01 PROCEDURE — 96372 THER/PROPH/DIAG INJ SC/IM: CPT | Performed by: INTERNAL MEDICINE

## 2022-08-01 PROCEDURE — 99207 PR NO CHARGE NURSE ONLY: CPT

## 2022-08-01 NOTE — PROGRESS NOTES
Indication: Prolia  (denosumab) is a prescription medicine used to treat osteoporosis in patients who:     Are at high risk for fracture, meaning patients who have had a fracture related to osteoporosis, or who have multiple risk factors for fracture     Cannot use another osteoporosis medicine or other osteoporosis medicines did not work well   The timeline for early/late injections would be 4 weeks early and any time after the 6 month steve. If a patient receives their injection late, then the subsequent injection would be 6 months from the date that they actually received the injection    1.  When was the last injection?  1/28/2022  2.  Did they check with their insurance for this calendar year?  Yes  3.  Is there an order in the chart?  Yes  4.  Has the patient had dental work involving the bone in the past month or will have work in the next 6 months?  No  5.  Did you have the patient wait 15 minutes after the injection?  Yes  6.  Remember to use .injection under the medication notes    The following steps were completed to comply with the REMS program for Prolia:    Reviewed information in the Medication Guide and Patient Counseling Chart, including the serious risks of Prolia  and the symptoms of each risk.    Advised patient to seek prompt medical attention if they have signs or symptoms of any of the serious risks.  Provided each patient a copy of the Medication Guide and Patient Brochure.

## 2022-08-05 ENCOUNTER — TRANSFERRED RECORDS (OUTPATIENT)
Dept: HEALTH INFORMATION MANAGEMENT | Facility: CLINIC | Age: 72
End: 2022-08-05

## 2022-08-23 ENCOUNTER — TELEPHONE (OUTPATIENT)
Dept: PODIATRY | Facility: CLINIC | Age: 72
End: 2022-08-23

## 2022-08-23 NOTE — TELEPHONE ENCOUNTER
Spoke with patient she needs to be seen for her inserts/ diabetic shoes as she is a diabetic. Made appointment for 9-1-22 for her to see Dr. Welsh.

## 2022-09-01 ENCOUNTER — OFFICE VISIT (OUTPATIENT)
Dept: PODIATRY | Facility: CLINIC | Age: 72
End: 2022-09-01
Payer: MEDICARE

## 2022-09-01 VITALS
HEART RATE: 67 BPM | BODY MASS INDEX: 17.71 KG/M2 | WEIGHT: 100 LBS | OXYGEN SATURATION: 96 % | RESPIRATION RATE: 12 BRPM

## 2022-09-01 DIAGNOSIS — L84 TYLOMA: ICD-10-CM

## 2022-09-01 DIAGNOSIS — E10.8 TYPE 1 DIABETES MELLITUS WITH COMPLICATION (H): ICD-10-CM

## 2022-09-01 DIAGNOSIS — M21.6X1 PLANTAR FLEXED METATARSAL BONE OF RIGHT FOOT: Primary | ICD-10-CM

## 2022-09-01 PROCEDURE — 99213 OFFICE O/P EST LOW 20 MIN: CPT | Performed by: PODIATRIST

## 2022-09-01 RX ORDER — METHYLCELLULOSE 2 G/19G
POWDER, FOR SOLUTION ORAL
COMMUNITY
Start: 2022-02-24

## 2022-09-01 RX ORDER — PROCHLORPERAZINE 25 MG/1
SUPPOSITORY RECTAL
COMMUNITY
Start: 2022-08-08

## 2022-09-01 ASSESSMENT — PAIN SCALES - GENERAL: PAINLEVEL: NO PAIN (0)

## 2022-09-01 NOTE — LETTER
9/1/2022         RE: Sammi Guadalupe  [address] [place] [address]        Dear Colleague,    Thank you for referring your patient, Sammi Guadalupe, to the [facility]. Please see a copy of my visit note below.    FOOT AND ANKLE SURGERY/PODIATRY Progress Note        ASSESSMENT:   Plantarflexed fifth metatarsal right foot  Tyloma right foot  Type 1 diabetes with complication        TREATMENT:  I have recommended a new pair of orthotics.  The patient is to return to the clinic as needed.           HPI:Sammi Guadalupe presented to the clinic today complaining of a painful corn on the bottom of her right foot near the small toe.  The patient stated she is extremely active.  She walks and hikes for exercise.  She wears hiking boots while participating in her activities. She has not had any redness, swelling, drainage or bleeding.  The pain is mild to moderate.  She described as a mild aching pain.  She aspirated when her orthotics which have  given her some relief.  She is requesting a new pair of orthotics.    Past Medical History:   Diagnosis Date     ASHD (arteriosclerotic heart disease) 2012    CT scan, calcium score 428 lad artery deployment of a non-drug-eluting stent in the LAD     DVT (deep venous thrombosis) (H)      Family history of myocardial infarction         Past Surgical History:   Procedure Laterality Date     BACK SURGERY       IR MISCELLANEOUS PROCEDURE  1/27/2014     IR PORT REMOVAL  2/6/2015     LAMINECTOMY AND MICRODISCECTOMY CERVICAL SPINE N/A      LAPAROSCOPIC HYSTERECTOMY N/A 12/12/2016    Procedure: ROBOTIC TOTAL LAPAROSCOPIC HYSTERECTOMY, BILATERAL SALPINGO-OOPHORECTOMY, CYSTOSCOPY;  Surgeon: Sohan Ramirez MD;  Location: [facility] OR;  Service:      MASTECTOMY Bilateral      RELEASE CARPAL TUNNEL Bilateral        Allergies   Allergen Reactions     Nitroglycerin Other (See Comments)     Blood Pressure drops significantly.     Sulfa (Sulfonamide  Antibiotics) [Sulfa Drugs] Dizziness     Dizziness and doesn't feel normal.     Metoprolol Other (See Comments)     Fatigue and cold         Current Outpatient Medications:      aspirin 81 MG tablet, [ASPIRIN 81 MG TABLET] Take 81 mg by mouth daily., Disp: , Rfl:      atorvastatin (LIPITOR) 40 MG tablet, Take 1 tablet (40 mg) by mouth At Bedtime, Disp: 90 tablet, Rfl: 3     b complex vitamins tablet, [B COMPLEX VITAMINS TABLET] Take 1 tablet by mouth daily., Disp: , Rfl:      blood glucose test strips, [BLOOD GLUCOSE TEST STRIPS] Dispense item covered by pt ins. Test 6 times a day, Disp: , Rfl:      blood-glucose sensor (DEXCOM G6 SENSOR) Teresa, [BLOOD-GLUCOSE SENSOR (DEXCOM G6 SENSOR) TERESA] USE AS DIRECTED FOR CONTINUOUS GLUCOSE MONITORING CHANGE EVERY 10 DAYS, Disp: , Rfl:      calcium carbonate (OS-PATTI) 600 mg (1,500 mg) tablet, [CALCIUM CARBONATE (OS-PATTI) 600 MG (1,500 MG) TABLET] Take 1,200 mg by mouth bedtime. Plus magnesium , Disp: , Rfl:      cholecalciferol, vitamin D3, (VITAMIN D3) 2,000 unit cap, [CHOLECALCIFEROL, VITAMIN D3, (VITAMIN D3) 2,000 UNIT CAP] Take 2,000 Units by mouth bedtime. , Disp: , Rfl:      insulin lispro (HUMALOG) 100 unit/mL injection, [INSULIN LISPRO (HUMALOG) 100 UNIT/ML INJECTION] 20 Units daily., Disp: , Rfl:      irbesartan-hydrochlorothiazide (AVALIDE) 150-12.5 MG tablet, Take 1 tablet by mouth daily, Disp: 90 tablet, Rfl: 3     lysine 500 mg Tab, [LYSINE 500 MG TAB] Take 500 mg by mouth 2 (two) times a day., Disp: , Rfl:      multivitamin therapeutic tablet, [MULTIVITAMIN THERAPEUTIC TABLET] Take 1 tablet by mouth daily., Disp: , Rfl:     Current Facility-Administered Medications:      denosumab (PROLIA) injection 60 mg, 60 mg, Subcutaneous, Q6 Months, Jeana Al MD, 60 mg at 08/01/22 0926     denosumab (PROLIA) injection 60 mg, 60 mg, Subcutaneous, Q6 Months, Jeana Al MD, 60 mg at 01/28/22 0932    Family History   Problem Relation Age of Onset     Cancer Mother       Coronary Artery Disease Mother      Cancer Father      Heart Disease Father      CABG Father      Heart Disease Sister        Social History     Socioeconomic History     Marital status: Single     Spouse name: Not on file     Number of children: Not on file     Years of education: Not on file     Highest education level: Not on file   Occupational History     Not on file   Tobacco Use     Smoking status: Former Smoker     Packs/day: 0.50     Years: 10.00     Pack years: 5.00     Smokeless tobacco: Never Used   Substance and Sexual Activity     Alcohol use: Yes     Alcohol/week: 5.0 standard drinks     Drug use: No     Sexual activity: Never   Other Topics Concern     Not on file   Social History Narrative     Not on file     Social Determinants of Health     Financial Resource Strain: Not on file   Food Insecurity: Not on file   Transportation Needs: Not on file   Physical Activity: Not on file   Stress: Not on file   Social Connections: Not on file   Intimate Partner Violence: Not on file   Housing Stability: Not on file       10 point Review of Systems is negative Wt 45.4 kg (100 lb)   BMI 17.71 kg/m      BMI= Body mass index is 17.71 kg/m .    OBJECTIVE:  General appearance: Patient is alert and fully cooperative with history & exam.  No sign of distress is noted during the visit.  Vascular: Dorsalis pedis and posterior tibial pulses are palpable. There is no pedal hair growth bilaterally.  CFT < 3 sec from anterior tibial surface to distal digits bilaterally. There is no appreciable edema noted.  Dermatologic: There is a small, round, hyperkeratotic nucleated lesion subfifth metatarsal head right foot.  Turgor and texture are within normal limits. No coloration or temperature changes. No other primary or secondary lesions noted.  Neurologic: All epicritic and proprioceptive sensations are grossly intact bilaterally.  Musculoskeletal: All active and passive ankle, subtalar, midtarsal, and 1st MPJ range of  motion are grossly intact without pain or crepitus, with the exception of none. Manual muscle strength is within normal limits bilaterally. All dorsiflexors, plantarflexors, invertors, evertors are intact bilaterally. Tenderness present to subfifth metatarsal head right foot on palpation.  No tenderness to the right foot with range of motion. Calf is soft/non-tender without warmth/induration    Imaging:         No results found.       Yunior Ayala DPM  [facility] Foot & Ankle Surgery/Podiatry         Again, thank you for allowing me to participate in the care of your patient.        Sincerely,        Yunior Welsh DPM

## 2022-09-01 NOTE — PROGRESS NOTES
FOOT AND ANKLE SURGERY/PODIATRY Progress Note        ASSESSMENT:   Plantarflexed fifth metatarsal right foot  Tyloma right foot  Type 1 diabetes with complication        TREATMENT:  I have recommended a new pair of orthotics.  The patient is to return to the clinic as needed.           HPI:Sammi Guadalupe presented to the clinic today complaining of a painful corn on the bottom of her right foot near the small toe.  The patient stated she is extremely active.  She walks and hikes for exercise.  She wears hiking boots while participating in her activities. She has not had any redness, swelling, drainage or bleeding.  The pain is mild to moderate.  She described as a mild aching pain.  She aspirated when her orthotics which have  given her some relief.  She is requesting a new pair of orthotics.    Past Medical History:   Diagnosis Date     ASHD (arteriosclerotic heart disease) 2012    CT scan, calcium score 428 lad artery deployment of a non-drug-eluting stent in the LAD     DVT (deep venous thrombosis) (H)      Family history of myocardial infarction         Past Surgical History:   Procedure Laterality Date     BACK SURGERY       IR MISCELLANEOUS PROCEDURE  1/27/2014     IR PORT REMOVAL  2/6/2015     LAMINECTOMY AND MICRODISCECTOMY CERVICAL SPINE N/A      LAPAROSCOPIC HYSTERECTOMY N/A 12/12/2016    Procedure: ROBOTIC TOTAL LAPAROSCOPIC HYSTERECTOMY, BILATERAL SALPINGO-OOPHORECTOMY, CYSTOSCOPY;  Surgeon: Sohan Ramirez MD;  Location: [facility];  Service:      MASTECTOMY Bilateral      RELEASE CARPAL TUNNEL Bilateral        Allergies   Allergen Reactions     Nitroglycerin Other (See Comments)     Blood Pressure drops significantly.     Sulfa (Sulfonamide Antibiotics) [Sulfa Drugs] Dizziness     Dizziness and doesn't feel normal.     Metoprolol Other (See Comments)     Fatigue and cold         Current Outpatient Medications:      aspirin 81 MG tablet, [ASPIRIN 81 MG TABLET] Take 81 mg by mouth daily., Disp:  , Rfl:      atorvastatin (LIPITOR) 40 MG tablet, Take 1 tablet (40 mg) by mouth At Bedtime, Disp: 90 tablet, Rfl: 3     b complex vitamins tablet, [B COMPLEX VITAMINS TABLET] Take 1 tablet by mouth daily., Disp: , Rfl:      blood glucose test strips, [BLOOD GLUCOSE TEST STRIPS] Dispense item covered by pt ins. Test 6 times a day, Disp: , Rfl:      blood-glucose sensor (DEXCOM G6 SENSOR) Teresa, [BLOOD-GLUCOSE SENSOR (DEXCOM G6 SENSOR) TERESA] USE AS DIRECTED FOR CONTINUOUS GLUCOSE MONITORING CHANGE EVERY 10 DAYS, Disp: , Rfl:      calcium carbonate (OS-PATTI) 600 mg (1,500 mg) tablet, [CALCIUM CARBONATE (OS-PATTI) 600 MG (1,500 MG) TABLET] Take 1,200 mg by mouth bedtime. Plus magnesium , Disp: , Rfl:      cholecalciferol, vitamin D3, (VITAMIN D3) 2,000 unit cap, [CHOLECALCIFEROL, VITAMIN D3, (VITAMIN D3) 2,000 UNIT CAP] Take 2,000 Units by mouth bedtime. , Disp: , Rfl:      insulin lispro (HUMALOG) 100 unit/mL injection, [INSULIN LISPRO (HUMALOG) 100 UNIT/ML INJECTION] 20 Units daily., Disp: , Rfl:      irbesartan-hydrochlorothiazide (AVALIDE) 150-12.5 MG tablet, Take 1 tablet by mouth daily, Disp: 90 tablet, Rfl: 3     lysine 500 mg Tab, [LYSINE 500 MG TAB] Take 500 mg by mouth 2 (two) times a day., Disp: , Rfl:      multivitamin therapeutic tablet, [MULTIVITAMIN THERAPEUTIC TABLET] Take 1 tablet by mouth daily., Disp: , Rfl:     Current Facility-Administered Medications:      denosumab (PROLIA) injection 60 mg, 60 mg, Subcutaneous, Q6 Months, Jeana Al MD, 60 mg at 08/01/22 0926     denosumab (PROLIA) injection 60 mg, 60 mg, Subcutaneous, Q6 Months, Jeana Al MD, 60 mg at 01/28/22 0932    Family History   Problem Relation Age of Onset     Cancer Mother      Coronary Artery Disease Mother      Cancer Father      Heart Disease Father      CABG Father      Heart Disease Sister        Social History     Socioeconomic History     Marital status: Single     Spouse name: Not on file     Number of children: Not on  file     Years of education: Not on file     Highest education level: Not on file   Occupational History     Not on file   Tobacco Use     Smoking status: Former Smoker     Packs/day: 0.50     Years: 10.00     Pack years: 5.00     Smokeless tobacco: Never Used   Substance and Sexual Activity     Alcohol use: Yes     Alcohol/week: 5.0 standard drinks     Drug use: No     Sexual activity: Never   Other Topics Concern     Not on file   Social History Narrative     Not on file     Social Determinants of Health     Financial Resource Strain: Not on file   Food Insecurity: Not on file   Transportation Needs: Not on file   Physical Activity: Not on file   Stress: Not on file   Social Connections: Not on file   Intimate Partner Violence: Not on file   Housing Stability: Not on file       10 point Review of Systems is negative Wt 45.4 kg (100 lb)   BMI 17.71 kg/m      BMI= Body mass index is 17.71 kg/m .    OBJECTIVE:  General appearance: Patient is alert and fully cooperative with history & exam.  No sign of distress is noted during the visit.  Vascular: Dorsalis pedis and posterior tibial pulses are palpable. There is no pedal hair growth bilaterally.  CFT < 3 sec from anterior tibial surface to distal digits bilaterally. There is no appreciable edema noted.  Dermatologic: There is a small, round, hyperkeratotic nucleated lesion subfifth metatarsal head right foot.  Turgor and texture are within normal limits. No coloration or temperature changes. No other primary or secondary lesions noted.  Neurologic: All epicritic and proprioceptive sensations are grossly intact bilaterally.  Musculoskeletal: All active and passive ankle, subtalar, midtarsal, and 1st MPJ range of motion are grossly intact without pain or crepitus, with the exception of none. Manual muscle strength is within normal limits bilaterally. All dorsiflexors, plantarflexors, invertors, evertors are intact bilaterally. Tenderness present to subfifth metatarsal  head right foot on palpation.  No tenderness to the right foot with range of motion. Calf is soft/non-tender without warmth/induration    Imaging:         No results found.       Yunior Welsh; ADRI  [facility] Foot & Ankle Surgery/Podiatry

## 2022-09-01 NOTE — PATIENT INSTRUCTIONS
What are Prescription Custom Orthotics?  Custom orthotics are specially-made devices designed to support and comfort your feet. Prescription orthotics are crafted for you and no one else. They match the contours of your feet precisely and are designed for the way you move. Orthotics are only manufactured after a podiatrist has conducted a complete evaluation of your feet, ankles, and legs, so the orthotic can accommodate your unique foot structure and pathology.  Prescription orthotics are divided into two categories:  Functional orthotics are designed to control abnormal motion. They may be used to treat foot pain caused by abnormal motion; they can also be used to treat injuries such as shin splints or tendinitis. Functional orthotics are usually crafted of a semi-rigid material such as plastic or graphite.  Accommodative orthotics are softer and meant to provide additional cushioning and support. They can be used to treat diabetic foot ulcers, painful calluses on the bottom of the foot, and other uncomfortable conditions.  Podiatrists use orthotics to treat foot problems such as plantar fasciitis, bursitis, tendinitis, diabetic foot ulcers, and foot, ankle, and heel pain. Clinical research studies have shown that podiatrist-prescribed foot orthotics decrease foot pain and improve function.  Orthotics typically cost more than shoe inserts purchased in a retail store, but the additional cost is usually well worth it. Unlike shoe inserts, orthotics are molded to fit each individual foot, so you can be sure that your orthotics fit and do what they're supposed to do. Prescription orthotics are also made of top-notch materials and last many years when cared for properly. Insurance often helps pay for prescription orthotics.  What are Shoe Inserts?   You've seen them at the grocery store and at the mall. You've probably even seen them on TV and online. Shoe inserts are any kind of non-prescription foot support designed  to be worn inside a shoe. Pre-packaged, mass produced, arch supports are shoe inserts. So are the  custom-made  insoles and foot supports that you can order online or at retail stores. Unless the device has been prescribed by a doctor and crafted for your specific foot, it's a shoe insert, not a custom orthotic device--despite what the ads might say.  Shoe inserts can be very helpful for a variety of foot ailments, including flat arches and foot and leg pain. They can cushion your feet, provide comfort, and support your arches, but they can't correct biomechanical foot problems or cure long-standing foot issues.  The most common types of shoe inserts are:  Arch supports: Some people have high arches. Others have low arches or flat feet. Arch supports generally have a  bumped-up  appearance and are designed to support the foot's natural arch.   Insoles: Insoles slip into your shoe to provide extra cushioning and support. Insoles are often made of gel, foam, or plastic.   Heel liners: Heel liners, sometimes called heel pads or heel cups, provide extra cushioning in the heel region. They may be especially useful for patients who have foot pain caused by age-related thinning of the heels' natural fat pads.   Foot cushions: Do your shoes rub against your heel or your toes? Foot cushions come in many different shapes and sizes and can be used as a barrier between you and your shoe.  Choosing an Over-the-Counter Shoe Insert  Selecting a shoe insert from the wide variety of devices on the market can be overwhelming. Here are some podiatrist-tested tips to help you find the insert that best meets your needs:  Consider your health. Do you have diabetes? Problems with circulation? An over-the-counter insert may not be your best bet. Diabetes and poor circulation increase your risk of foot ulcers and infections, so schedule an appointment with a podiatrist. He or she can help you select a solution that won't cause additional  health problems.   Think about the purpose. Are you planning to run a marathon, or do you just need a little arch support in your work shoes? Look for a product that fits your planned level of activity.   Bring your shoes. For the insert to be effective, it has to fit into your shoes. So bring your sneakers, dress shoes, or work boots--whatever you plan to wear with your insert. Look for an insert that will fit the contours of your shoe.   Try them on. If all possible, slip the insert into your shoe and try it out. Walk around a little. How does it feel? Don't assume that feelings of pressure will go away with continued wear. (If you can't try the inserts at the store, ask about the store's return policy and hold on to your receipt.)    Please call one of the [place] locations below to schedule an appointment. If you received a prescription please bring it with you to your appointment. Some locations are limited to what they carry.    Office Locations    [facility]  [address] [place] [address]  Home Medical Equipment, [address]   Phone: [phone]   Orthotics and Prosthetics, [address]   Phone: [phone]    [facility]  [address]  [address]   [place] [address]   Phone: [phone]    [facility]  [address]  [place] [address]  Phone: [phone]    [facility]   [address]  [place] [address]  Phone: [phone]    [facility]  [address]  [place] [address]  Phone: [phone]    [facility]  [address]  [place] [address]  Phone: [phone]    [place]   [address] [place] [address]   Phone: [phone]    WEARING YOUR CUSTOM FOOT ORTHOTICS   Most  insurance plans cover one pair of orthotics per year. You must check with your   insurance plan to see what your payment responsibility will be. Please call your   insurance company by calling the number on the back of your insurance card.   Orthotic's are non-refundable and non-returnable.   Orthotics are made of various designs. Some orthotics are covered with material that extends beyond your toes. If your orthotic is of this design, you will likely need to trim the toe end to get a proper fit. The insole from your shoe can be used as a template. Simply overlay the shoe insert on top of the custom orthotic. Align the heel end while tracing the length of the insert onto the custom orthotic. Use a large scissor to trim the toe end until you get a proper fit in the shoe.   The orthotic needs to be pushed as far back in the shoe as possible. The heel portion should not ride forward so as not to irritate your heel.   Orthotics are designed to work with socks. Excessive perspiration will shorten the life span of the orthotics. Remove the orthotic from the shoe frequently for proper drying.   The break-in period lasts for weeks. People new to orthotics will likely experience new aches and pains. The orthotic is forcing your foot into a new position. Arch, foot and leg muscle aches and fatigue are common during these weeks. Minor discomfort can be considered normal break in phenomenon. Start wearing your orthotic around your home your first day. Limited activity for one to two hours is recommended. You can increase one or two additional hours each day provided the aches and pains are subsiding. The degree of discomfort, fatigue and problems will dictate the speed of break in. You may require multiple weeks to work up to full time use.   Do not continue wearing your orthotics if they are creating problems such as blisters or sores. Do not hesitate to call the clinic to speak with a nurse regarding orthotic   break in,  fit, trimming, etc. You may also need to see the doctor if the orthotics are   simply not working out. Adjustments are sometimes made to improve orthotic   function.     Orthotics will only work in certain styles and types of shoes. Orthotics rarely work in dress shoes. Slip-ons, clogs, sandals and heels are particularly troublesome. Specially designed orthotics may be necessary for these types of shoes. Your custom orthotic was designed for activities that require appropriate walking or running shoes. Lace up athletic shoes, walking shoes or work boots should work appropriately. You may need a wider or longer shoe. Shoes with a removable  or insert work best. In general, you want to remove an insert from the shoe before placing the orthotic into the shoe. Shoes without a removable liner may not work as well.     When purchasing new shoes, bring your orthotics along to get a proper fit. Shop at stores that are familiar with orthotics.   Frequent washing of the orthotic may shorten the life span of the top cover. The top cover can be replaced but will generally last one to five years depending on use and foot perspiration.

## 2022-10-03 DIAGNOSIS — I10 ESSENTIAL HYPERTENSION: ICD-10-CM

## 2022-10-03 RX ORDER — IRBESARTAN AND HYDROCHLOROTHIAZIDE 150; 12.5 MG/1; MG/1
1 TABLET, FILM COATED ORAL DAILY
Qty: 90 TABLET | Refills: 0 | Status: SHIPPED | OUTPATIENT
Start: 2022-10-03 | End: 2023-01-25

## 2022-10-03 NOTE — TELEPHONE ENCOUNTER
"Last Written Prescription Date:  10/6/21  Last Fill Quantity: 90,  # refills: 3   Last office visit provider:  1/31/22     Requested Prescriptions   Pending Prescriptions Disp Refills     irbesartan-hydrochlorothiazide (AVALIDE) 150-12.5 MG tablet 90 tablet 3     Sig: Take 1 tablet by mouth daily       Angiotensin-II Receptors Passed - 10/3/2022 11:38 AM        Passed - Last blood pressure under 140/90 in past 12 months     BP Readings from Last 3 Encounters:   04/28/22 137/83   01/31/22 110/69   01/24/22 125/73                 Passed - Recent (12 mo) or future (30 days) visit within the authorizing provider's specialty     Patient has had an office visit with the authorizing provider or a provider within the authorizing providers department within the previous 12 mos or has a future within next 30 days. See \"Patient Info\" tab in inbasket, or \"Choose Columns\" in Meds & Orders section of the refill encounter.              Passed - Medication is active on med list        Passed - Patient is age 18 or older        Passed - No active pregnancy on record        Passed - Normal serum creatinine on file in past 12 months     Recent Labs   Lab Test 10/21/21  0936   CR 0.66       Ok to refill medication if creatinine is low          Passed - Normal serum potassium on file in past 12 months     Recent Labs   Lab Test 10/21/21  0936   POTASSIUM 4.0                    Passed - No positive pregnancy test in past 12 months       Diuretics (Including Combos) Protocol Passed - 10/3/2022 11:38 AM        Passed - Blood pressure under 140/90 in past 12 months     BP Readings from Last 3 Encounters:   04/28/22 137/83   01/31/22 110/69   01/24/22 125/73                 Passed - Recent (12 mo) or future (30 days) visit within the authorizing provider's specialty     Patient has had an office visit with the authorizing provider or a provider within the authorizing providers department within the previous 12 mos or has a future within next " "30 days. See \"Patient Info\" tab in inbasket, or \"Choose Columns\" in Meds & Orders section of the refill encounter.              Passed - Medication is active on med list        Passed - Patient is age 18 or older        Passed - No active pregancy on record        Passed - Normal serum creatinine on file in past 12 months     Recent Labs   Lab Test 10/21/21  0936   CR 0.66              Passed - Normal serum potassium on file in past 12 months     Recent Labs   Lab Test 10/21/21  0936   POTASSIUM 4.0                    Passed - Normal serum sodium on file in past 12 months     Recent Labs   Lab Test 10/21/21  0936                 Passed - No positive pregnancy test in past 12 months             Jackie Salinas RN 10/03/22 4:33 PM  "

## 2022-10-24 ENCOUNTER — OFFICE VISIT (OUTPATIENT)
Dept: INTERNAL MEDICINE | Facility: CLINIC | Age: 72
End: 2022-10-24
Payer: MEDICARE

## 2022-10-24 VITALS
TEMPERATURE: 97.1 F | HEIGHT: 63 IN | BODY MASS INDEX: 18.3 KG/M2 | HEART RATE: 65 BPM | DIASTOLIC BLOOD PRESSURE: 62 MMHG | SYSTOLIC BLOOD PRESSURE: 112 MMHG | WEIGHT: 103.3 LBS | RESPIRATION RATE: 12 BRPM | OXYGEN SATURATION: 100 %

## 2022-10-24 DIAGNOSIS — C50.919 MALIGNANT NEOPLASM OF FEMALE BREAST, UNSPECIFIED ESTROGEN RECEPTOR STATUS, UNSPECIFIED LATERALITY, UNSPECIFIED SITE OF BREAST (H): ICD-10-CM

## 2022-10-24 DIAGNOSIS — Z00.00 HEALTH MAINTENANCE EXAMINATION: ICD-10-CM

## 2022-10-24 DIAGNOSIS — M85.89 OSTEOPENIA OF MULTIPLE SITES: ICD-10-CM

## 2022-10-24 DIAGNOSIS — M81.0 AGE-RELATED OSTEOPOROSIS WITHOUT CURRENT PATHOLOGICAL FRACTURE: ICD-10-CM

## 2022-10-24 DIAGNOSIS — I10 HYPERTENSION, UNSPECIFIED TYPE: ICD-10-CM

## 2022-10-24 DIAGNOSIS — I10 ESSENTIAL HYPERTENSION: ICD-10-CM

## 2022-10-24 DIAGNOSIS — Z96.41 INSULIN PUMP IN PLACE: ICD-10-CM

## 2022-10-24 DIAGNOSIS — Z95.5 S/P CORONARY ARTERY STENT PLACEMENT: Chronic | ICD-10-CM

## 2022-10-24 DIAGNOSIS — E10.9 CONTROLLED TYPE 1 DIABETES MELLITUS WITHOUT COMPLICATION (H): ICD-10-CM

## 2022-10-24 DIAGNOSIS — H90.3 SENSORY HEARING LOSS, BILATERAL: ICD-10-CM

## 2022-10-24 DIAGNOSIS — Z00.00 ENCOUNTER FOR ANNUAL WELLNESS EXAM IN MEDICARE PATIENT: Primary | ICD-10-CM

## 2022-10-24 DIAGNOSIS — Z90.13 H/O BILATERAL MASTECTOMY: ICD-10-CM

## 2022-10-24 DIAGNOSIS — Z85.828 HISTORY OF BASAL CELL CARCINOMA: ICD-10-CM

## 2022-10-24 DIAGNOSIS — I25.83 CORONARY ATHEROSCLEROSIS DUE TO LIPID RICH PLAQUE: ICD-10-CM

## 2022-10-24 DIAGNOSIS — G62.9 NEUROPATHY: ICD-10-CM

## 2022-10-24 DIAGNOSIS — Z13.220 SCREENING FOR HYPERLIPIDEMIA: ICD-10-CM

## 2022-10-24 DIAGNOSIS — E55.9 VITAMIN D INSUFFICIENCY: ICD-10-CM

## 2022-10-24 PROBLEM — I82.4Y9 DEEP VEIN THROMBOSIS (DVT) OF PROXIMAL LOWER EXTREMITY (H): Status: ACTIVE | Noted: 2022-06-05

## 2022-10-24 PROBLEM — E13.9 LADA (LATENT AUTOIMMUNE DIABETES IN ADULTS), MANAGED AS TYPE 1 (H): Status: ACTIVE | Noted: 2017-09-05

## 2022-10-24 PROBLEM — E46 PROTEIN-CALORIE MALNUTRITION (H): Status: ACTIVE | Noted: 2022-06-05

## 2022-10-24 LAB
ALBUMIN SERPL BCG-MCNC: 4.8 G/DL (ref 3.5–5.2)
ALP SERPL-CCNC: 52 U/L (ref 35–104)
ALT SERPL W P-5'-P-CCNC: 28 U/L (ref 10–35)
ANION GAP SERPL CALCULATED.3IONS-SCNC: 13 MMOL/L (ref 7–15)
AST SERPL W P-5'-P-CCNC: 46 U/L (ref 10–35)
BILIRUB SERPL-MCNC: 0.8 MG/DL
BUN SERPL-MCNC: 18.6 MG/DL (ref 8–23)
CALCIUM SERPL-MCNC: 9.8 MG/DL (ref 8.8–10.2)
CHLORIDE SERPL-SCNC: 100 MMOL/L (ref 98–107)
CHOLEST SERPL-MCNC: 152 MG/DL
CREAT SERPL-MCNC: 0.57 MG/DL (ref 0.51–0.95)
DEPRECATED HCO3 PLAS-SCNC: 27 MMOL/L (ref 22–29)
GFR SERPL CREATININE-BSD FRML MDRD: >90 ML/MIN/1.73M2
GLUCOSE SERPL-MCNC: 129 MG/DL (ref 70–99)
HDLC SERPL-MCNC: 93 MG/DL
LDLC SERPL CALC-MCNC: 49 MG/DL
NONHDLC SERPL-MCNC: 59 MG/DL
POTASSIUM SERPL-SCNC: 3.8 MMOL/L (ref 3.4–5.3)
PROT SERPL-MCNC: 7.2 G/DL (ref 6.4–8.3)
SODIUM SERPL-SCNC: 140 MMOL/L (ref 136–145)
TRIGL SERPL-MCNC: 50 MG/DL
TSH SERPL DL<=0.005 MIU/L-ACNC: 2.16 UIU/ML (ref 0.3–4.2)

## 2022-10-24 PROCEDURE — 91312 COVID-19,PF,PFIZER BOOSTER BIVALENT: CPT | Performed by: INTERNAL MEDICINE

## 2022-10-24 PROCEDURE — 99214 OFFICE O/P EST MOD 30 MIN: CPT | Mod: 25 | Performed by: INTERNAL MEDICINE

## 2022-10-24 PROCEDURE — G0008 ADMIN INFLUENZA VIRUS VAC: HCPCS | Performed by: INTERNAL MEDICINE

## 2022-10-24 PROCEDURE — G0439 PPPS, SUBSEQ VISIT: HCPCS | Performed by: INTERNAL MEDICINE

## 2022-10-24 PROCEDURE — 80053 COMPREHEN METABOLIC PANEL: CPT | Performed by: INTERNAL MEDICINE

## 2022-10-24 PROCEDURE — 80061 LIPID PANEL: CPT | Performed by: INTERNAL MEDICINE

## 2022-10-24 PROCEDURE — 83921 ORGANIC ACID SINGLE QUANT: CPT | Performed by: INTERNAL MEDICINE

## 2022-10-24 PROCEDURE — 36415 COLL VENOUS BLD VENIPUNCTURE: CPT | Performed by: INTERNAL MEDICINE

## 2022-10-24 PROCEDURE — 82306 VITAMIN D 25 HYDROXY: CPT | Performed by: INTERNAL MEDICINE

## 2022-10-24 PROCEDURE — 84443 ASSAY THYROID STIM HORMONE: CPT | Performed by: INTERNAL MEDICINE

## 2022-10-24 PROCEDURE — 90662 IIV NO PRSV INCREASED AG IM: CPT | Performed by: INTERNAL MEDICINE

## 2022-10-24 PROCEDURE — 0124A COVID-19,PF,PFIZER BOOSTER BIVALENT: CPT | Performed by: INTERNAL MEDICINE

## 2022-10-24 RX ORDER — ALENDRONATE SODIUM 70 MG/1
70 TABLET ORAL
Qty: 12 TABLET | Refills: 1 | Status: SHIPPED | OUTPATIENT
Start: 2022-10-24 | End: 2023-05-23

## 2022-10-24 ASSESSMENT — ENCOUNTER SYMPTOMS
JOINT SWELLING: 0
EYE PAIN: 0
CONSTIPATION: 0
HEADACHES: 0
SORE THROAT: 0
DIZZINESS: 0
SHORTNESS OF BREATH: 0
FREQUENCY: 0
DYSURIA: 0
NAUSEA: 0
ABDOMINAL PAIN: 0
FEVER: 0
COUGH: 0
HEMATURIA: 0
PARESTHESIAS: 0
MYALGIAS: 0
DIARRHEA: 0
CHILLS: 0
HEARTBURN: 0
HEMATOCHEZIA: 0
PALPITATIONS: 0
ARTHRALGIAS: 0
BREAST MASS: 0
WEAKNESS: 0
NERVOUS/ANXIOUS: 0

## 2022-10-24 ASSESSMENT — ACTIVITIES OF DAILY LIVING (ADL): CURRENT_FUNCTION: NO ASSISTANCE NEEDED

## 2022-10-24 ASSESSMENT — PAIN SCALES - GENERAL: PAINLEVEL: NO PAIN (0)

## 2022-10-24 NOTE — PROGRESS NOTES
"SUBJECTIVE:   Sammi is a 71 year old who presents for Preventive Visit.  Eye Dr Rausch   She is a breast cancer survivor and has a history of type 1 diabetes has an insulin pump in place.  She sees a diet pathologist.  And has a Dexcom glucometer.  She is doing well she has no concerns.  Patient has been advised of split billing requirements and indicates understanding: Yes  Are you in the first 12 months of your Medicare coverage?  No    Healthy Habits:     In general, how would you rate your overall health?  Excellent    Frequency of exercise:  6-7 days/week    Duration of exercise:  Greater than 60 minutes    Do you usually eat at least 4 servings of fruit and vegetables a day, include whole grains    & fiber and avoid regularly eating high fat or \"junk\" foods?  Yes    Taking medications regularly:  Yes    Medication side effects:  None    Ability to successfully perform activities of daily living:  No assistance needed    Home Safety:  Lack of grab bars in the bathroom    Hearing Impairment:  Difficulty understanding soft or whispered speech    In the past 6 months, have you been bothered by leaking of urine? Yes    In general, how would you rate your overall mental or emotional health?  Excellent      PHQ-2 Total Score: 0    Additional concerns today:  Yes  Imm/Inj      Do you feel safe in your environment? Yes    Have you ever done Advance Care Planning? (For example, a Health Directive, POLST, or a discussion with a medical provider or your loved ones about your wishes): Yes, advance care planning is on file.       Fall risk  Fallen 2 or more times in the past year?: No  Any fall with injury in the past year?: No    Cognitive Screening   Mini-CogTM Copyright MELVI Robles. Licensed by the author for use in [facility]; reprinted with permission ([email or web address]). All rights reserved.    Done below  Do you have sleep apnea, excessive snoring or daytime drowsiness?: no    Reviewed and updated as needed this " visit by clinical staff   Tobacco  Allergies  Meds            Current Outpatient Medications   Medication     alendronate (FOSAMAX) 70 MG tablet     aspirin 81 MG tablet     atorvastatin (LIPITOR) 40 MG tablet     b complex vitamins tablet     blood glucose test strips     blood-glucose sensor (DEXCOM G6 SENSOR) Lida     calcium carbonate (OS-PATTI) 600 mg (1,500 mg) tablet     cholecalciferol, vitamin D3, (VITAMIN D3) 2,000 unit cap     Continuous Blood Gluc Transmit (DEXCOM G6 TRANSMITTER) MISC     insulin lispro (HUMALOG) 100 unit/mL injection     irbesartan-hydrochlorothiazide (AVALIDE) 150-12.5 MG tablet     lysine 500 mg Tab     methylcellulose (CITRUCEL) powder     multivitamin therapeutic tablet     Current Facility-Administered Medications   Medication     denosumab (PROLIA) injection 60 mg     denosumab (PROLIA) injection 60 mg     Patient Active Problem List   Diagnosis     Hypertension     Coronary Artery Disease     Malignant neoplasm of lower-outer quadrant of female breast (H)     Breast cancer, right breast (H)     Swelling of arm     DVT (deep venous thrombosis) (H)     Mixed hyperlipidemia     Age-related osteoporosis without current pathological fracture     Diabetes mellitus type I, controlled (H)     S/P coronary artery stent placement     Adverse effect of other drugs, medicaments and biological substances, sequela     Insulin pump in place     Type 1 diabetes mellitus with complication (H)     Health maintenance examination     Arm pain     Cervical radiculopathy     CTS (carpal tunnel syndrome)     Lumbar disc herniation     Numbness     Osteopenia of multiple sites     Protein-calorie malnutrition (H)     Weak     Malignant neoplasm of breast (H)     Controlled type 1 diabetes mellitus without complication (H)     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                               Correct - 0 points    What month is it?                               Correct - 0 points      Give the patient an address to remember with five components:   [place] ( first and last name - 2 components)   [address]  (number and name of street - 2 components)   [place] ( city - 1 component)      About what time is it (within the hour)? Correct - 0 points    Count backwards from 20 to 1:   Correct - 0 points    Say the months of the year in reverse: Correct - 0 points    Repeat the address phrase:   1 error - 2 points    Total 6CIT Score:      2/28    Interpretation: The 6CIT uses an inverse score and questions are weighted to produce a total out of 28. Scores of 0-7 are considered normal and 8 or more significant.    Advantages The test has high sensitivity without compromising specificity even in mild dementia. It is easy to translate linguistically and culturally.  Disadvantages The main disadvantage is in the scoring and weighting of the test, which is initially confusing, however computer models have simplified this greatly.    Probability Statistics: At the 7/8 cut off: Overall figures sensitivity 90% specificity 100%, in mild dementia sensitivity = 78% , specificity = 100%    Copyright 2000 [facility], [facility]. Courtesy of Dr. Macho Fields    Reviewed and updated as needed this visit by Provider                 Social History     Tobacco Use     Smoking status: Former     Packs/day: 0.50     Years: 10.00     Pack years: 5.00     Types: Cigarettes     Smokeless tobacco: Never   Substance Use Topics     Alcohol use: Yes     Alcohol/week: 5.0 standard drinks         Alcohol Use 10/24/2022   Prescreen: >3 drinks/day or >7 drinks/week? No   Prescreen: >3 drinks/day or >7 drinks/week? -               Current providers sharing in care for this patient include:   Patient Care Team:  Servando  Jeana HERNANDEZ MD as PCP - General (Internal Medicine)  Gagan Sanchez MD as MD (Hematology & Oncology)  Jeana Al MD as Assigned PCP  Gagan Sanchez MD as Assigned Cancer Care Provider  Yunior Welsh DPM as Assigned Musculoskeletal Provider  Gayatri More APRN CNP as Nurse Practitioner (Colon & Rectal)  Gayatri More APRN CNP as Assigned Surgical Provider    The following health maintenance items are reviewed in Epic and correct as of today:  Health Maintenance   Topic Date Due     DIABETIC FOOT EXAM  Never done     LUNG CANCER SCREENING  Never done     EYE EXAM  05/25/2018     MICROALBUMIN  10/19/2021     COVID-19 Vaccine (5 - Booster for Pfizer series) 06/10/2022     INFLUENZA VACCINE (1) 09/01/2022     BMP  10/21/2022     LIPID  10/21/2022     MEDICARE ANNUAL WELLNESS VISIT  10/21/2022     ANNUAL REVIEW OF HM ORDERS  01/31/2023     A1C  03/15/2023     FALL RISK ASSESSMENT  10/24/2023     COLORECTAL CANCER SCREENING  04/20/2025     ADVANCE CARE PLANNING  10/23/2026     DTAP/TDAP/TD IMMUNIZATION (4 - Td or Tdap) 01/02/2031     DEXA  01/07/2036     HEPATITIS C SCREENING  Completed     PHQ-2 (once per calendar year)  Completed     Pneumococcal Vaccine: 65+ Years  Completed     ZOSTER IMMUNIZATION  Completed     IPV IMMUNIZATION  Aged Out     MENINGITIS IMMUNIZATION  Aged Out     MAMMO SCREENING  Discontinued           FHS-7:   Breast CA Risk Assessment (FHS-7) 10/24/2022   Did any of your first-degree relatives have breast or ovarian cancer? Yes   Did any of your relatives have bilateral breast cancer? No   Did any man in your family have breast cancer? No   Did any woman in your family have breast and ovarian cancer? Yes   Did any woman in your family have breast cancer before age 50 y? No   Do you have 2 or more relatives with breast and/or ovarian cancer? No   Do you have 2 or more relatives with breast and/or bowel cancer? No         Pertinent mammograms  "are reviewed under the imaging tab.    Review of Systems  no shortness of breath ,no  chest pain ,no  Falls, no urinary incontinence , no weight changes , sleep and moodhave been good . Independent in ADLS and IADLS     OBJECTIVE:   /62 (BP Location: Left arm, Patient Position: Sitting, Cuff Size: Adult Regular)   Pulse 65   Temp 97.1  F (36.2  C) (Tympanic)   Resp 12   Ht 1.6 m (5' 3\")   Wt 46.9 kg (103 lb 4.8 oz)   SpO2 100%   BMI 18.30 kg/m   Estimated body mass index is 18.3 kg/m  as calculated from the following:    Height as of this encounter: 1.6 m (5' 3\").    Weight as of this encounter: 46.9 kg (103 lb 4.8 oz).  Physical Exam  GENERAL: healthy, alert and no distress  EYES: Eyes grossly normal to inspection, PERRL and conjunctivae and sclerae normal  HENT: ear canals and TM's normal, nose and mouth without ulcers or lesions  NECK: no adenopathy, no asymmetry, masses, or scars and thyroid normal to palpation  RESP: lungs clear to auscultation - no rales, rhonchi or wheezes  BREAST: Mastectomy scars are normal  CV: regular rate and rhythm, normal S1 S2, no S3 or S4, no murmur, click or rub, no peripheral edema and peripheral pulses strong  ABDOMEN: soft, nontender, no hepatosplenomegaly, no masses and bowel sounds normal  MS: no gross musculoskeletal defects noted, no edema  SKIN: no suspicious lesions or rashes  NEURO: Normal strength and tone, mentation intact and speech normal  PSYCH: mentation appears normal, affect normal/bright    Romberg's negative heel lift negative    ASSESSMENT / PLAN:   (Z00.00) Encounter for annual wellness exam in Medicare patient  (primary encounter diagnosis)  Comment:   Plan:     (E10.9) Controlled type 1 diabetes mellitus without complication (H)  Comment:   Lab Results   Component Value Date    A1C 5.9 10/19/2020    A1C 6.0 09/24/2018    A1C 6.0 10/10/2016    A1C 5.7 10/07/2015     Excellent control.  She is on a statin, aspirin ,ARB microalbumin is negative and " is up-to-date and eye exam  Plan: Comprehensive metabolic panel, TSH            (I10) Hypertension, unspecified type  Comment: Well-controlled   Plan:     (Z13.220) Screening for hyperlipidemia  Comment:   Plan: Lipid panel reflex to direct LDL Non-fasting            (I25.10,  I25.83) Coronary atherosclerosis due to lipid rich plaque  Comment:   Plan: Lipid panel reflex to direct LDL Non-fasting            (C50.919) Malignant neoplasm of female breast, unspecified estrogen receptor status, unspecified laterality, unspecified site of breast (H)  Comment:   Plan: In remission    (I10) Hypertension  Comment:   Plan:     (M81.0) Age-related osteoporosis without current pathological fracture  Comment: She was initially started on Fosamax when she was on antiestrogen medications postmastectomy.  At that time the bone density showed osteopenia.  Because she did not tolerate Fosamax she was switched to Prolia.  2 bone densities from 2018 and 2022 shows stability with no decline and no osteoporosis.  I believe she does not really need Prolia as that has to be given for life and it is a bit aggressive for the treatment of osteopenia.  She is due for her next shot in January.  We can start Fosamax in December.  If tolerated she can defer her January injection.  Take the Fosamax for another 4 to 5 months and then we can decide if she needs Prolia in July or not.  If she tolerates the Fosamax then she can no longer be on Prolia.  Can repeat bone density on Fosamax after a year.  And she can take a drug holiday after 5 years  Plan:     (Z95.5) S/P coronary artery stent placement  Comment:   Plan:     (Z00.00) Health maintenance examination  Comment:   Plan: Colonoscopy due 2025   She is due for her flu shot and her COVID omicron shot  (Z96.41) Insulin pump in place  Comment:   Plan:     (M85.89) Osteopenia of multiple sites  Comment: was on fosamax and then switched to prolia   Plan: alendronate (FOSAMAX) 70 MG tablet         "    (E55.9) Vitamin D insufficiency  Comment:   Plan: Vitamin D Deficiency            (G62.9) Neuropathy  Comment: She had neuropathy with numbness and after her laminectomy.  An EMG study before the laminectomy.  Was over 10 years ago.  No burning pain.  Her balance is quite good.  It is more like numbness in her toes.  Repeat EMG study.  Most likely this is a peripheral neuropathy.  She has no pain then there is no need for any treatment.  Plan: Methylmalonic Acid, EMG            (Z90.13) H/O bilateral mastectomy  Comment:   Plan:     (Z85.828) History of basal cell carcinoma  Comment: Possibly followed by dermatology  Plan:     (H90.3) Sensory hearing loss, bilateral  Comment:   Plan: Used to wear hearing aids but is quite functional right now.    Weight is stable she has no further weight loss.      COUNSELING:  Reviewed preventive health counseling, as reflected in patient instructions    Estimated body mass index is 18.3 kg/m  as calculated from the following:    Height as of this encounter: 1.6 m (5' 3\").    Weight as of this encounter: 46.9 kg (103 lb 4.8 oz).        She reports that she has quit smoking. Her smoking use included cigarettes. She has a 5.00 pack-year smoking history. She has never used smokeless tobacco.      Appropriate preventive services were discussed with this patient, including applicable screening as appropriate for cardiovascular disease, diabetes, osteopenia/osteoporosis, and glaucoma.  As appropriate for age/gender, discussed screening for colorectal cancer, prostate cancer, breast cancer, and cervical cancer. Checklist reviewing preventive services available has been given to the patient.    Reviewed patients plan of care and provided an AVS. The Basic Care Plan (routine screening as documented in Health Maintenance) for Sammi meets the Care Plan requirement. This Care Plan has been established and reviewed with the Patient.    Counseling Resources:  ATP IV Guidelines  Pooled " Cohorts Equation Calculator  Breast Cancer Risk Calculator  Breast Cancer: Medication to Reduce Risk  FRAX Risk Assessment  ICSI Preventive Guidelines  Dietary Guidelines for Americans, 2010  [employer] MyPlate  ASA Prophylaxis  Lung CA Screening    Jeana Al MD  [facility]    Identified Health Risks:

## 2022-10-24 NOTE — PATIENT INSTRUCTIONS
Dexa scan can be done next year after 12 months on fosamax    Trial of fosamax in December so that you dont have to take prolia shot in January    Take fosamax for 6 months if no pain then prolia can be deferred

## 2022-10-25 LAB — DEPRECATED CALCIDIOL+CALCIFEROL SERPL-MC: 70 UG/L (ref 20–75)

## 2022-10-27 LAB — METHYLMALONATE SERPL-SCNC: 0.11 UMOL/L (ref 0–0.4)

## 2022-12-08 ENCOUNTER — OFFICE VISIT (OUTPATIENT)
Dept: NEUROLOGY | Facility: CLINIC | Age: 72
End: 2022-12-08
Attending: INTERNAL MEDICINE
Payer: MEDICARE

## 2022-12-08 DIAGNOSIS — G62.9 NEUROPATHY: ICD-10-CM

## 2022-12-08 PROCEDURE — 95910 NRV CNDJ TEST 7-8 STUDIES: CPT | Performed by: PSYCHIATRY & NEUROLOGY

## 2022-12-08 PROCEDURE — 95886 MUSC TEST DONE W/N TEST COMP: CPT | Mod: LT | Performed by: PSYCHIATRY & NEUROLOGY

## 2022-12-08 NOTE — PROCEDURES
ELECTROMYOGRAPHY (EMG) REPORT       [facility] NEUROLOGY [place] [address] [place] [address]  Tel: [phone]  Fax: [phone]  [email or web address]     Sammi Guadalupe,  1950, MRN [id number]  PCP: Jeana Al  Date: 2022     Principal Diagnosis: Neuropathy     Height: 5 feet 3 inch  Reason for referral: Evaluate bilateral lowers. c/o numbness in both legs/feet > 2 years. Worse the last 9 months. Right > Left. Diabetic > 10 years. h/o lumbar surgery. Breast cancer, was chemo.      Motor NCS      Nerve / Sites Lat Amp Dist Christiano    ms mV cm m/s   R Peroneal - EDB      Ankle 5.83 2.5 8       Fib head 11.41 2.0 23.5 42      Pop fossa 14.06 2.2 11 41   L Peroneal - EDB      Ankle 4.95 4.5 8       Fib head 10.42 4.4 24.5 45      Pop fossa 12.92 4.6 11 44   R Tibial - AH      Ankle 5.47 9.7 8       Pop fossa 13.44 8.9 34 43   L Tibial - AH      Ankle 4.84 11.3 8       Pop fossa 12.55 10.6 33 43       F  Wave      Nerve Fmin    ms   R Tibial - AH 53.80   L Tibial - AH 53.59       Sensory NCS      Nerve / Sites Onset Lat Pk Lat Amp.2-3 Dist Christiano    ms ms  V cm m/s   R Sural - Ankle (Calf)      Calf 2.86 3.80 15.0 14 49   L Sural - Ankle (Calf)      Calf 2.92 3.65 10.1 14 48   R Superficial peroneal - Ankle      Lat leg 2.45 3.18 10.5 12 49   L Superficial peroneal - Ankle      Lat leg 2.60 3.44 8.1 12 46       EMG Summary Table     Spontaneous MUAP Rcmt Note   Muscle Fib PSW Fasc IA # Amp Dur PPP Rate Type   R. Gluteus medius None None None N N N N N N N   R. Gluteus jhoan None None None N N N N N N N   R. Biceps femoris (short head) Occ Occ None N Sl Red Incr Incr N N N   R. Gastrocnemius (Medial head) Occ Occ None N Sl Red Incr Incr N N N   R. Gastrocnemius (Lateral head) Occ Occ None N Sl Red Incr Incr N N N   R. Tibialis posterior Occ Occ None N Sl Red Incr Incr N N N   R. Adductor brent None None None N N N N N N N   R. Quadriceps None None None N N N N N N N   R.  Tibialis anterior None None None N N N N N N N   L. Adductor brent None None None N N N N N N N   L. Quadriceps None None None N N N N N N N   L. Tibialis anterior None None None N N N N N N N   L. Gastrocnemius (Medial head) None None None Incr Sl Red Incr Incr N N N   L. Gastrocnemius (Lateral head) None None None Incr Sl Red Incr Incr N N N   L. Tibialis posterior None None None Incr Sl Red Incr Incr N N N        Summary: Nerve conduction and EMG study of left lower extremity shows:  1. Normal bilateral peroneal and tibial CMAP with normal latencies and conduction velocities.  2. Normal bilateral tibial F latency  3. Normal bilateral sural and superficial peroneal SNAP  4. Needle exam showed changes as above    Impression:   This is a normal nerve conduction study of left lower extremity.  Rare denervation in above noted muscles could suggest left S1 radiculopathy.  Clinical correlation is recommended.      Jamal Garcia MD  [facility]  (Formerly, [facility]      This note was dictated using voice recognition software.  Any grammatical or context distortions are unintentional and inherent to the software.

## 2022-12-08 NOTE — LETTER
12/8/2022         RE: Sammi Guadalupe  [address] [place] [address]        Dear Colleague,    Thank you for referring your patient, Sammi Guadalupe, to the [facility] NE[facility] [place]. Please see a copy of my visit note below.    See procedure note for EMG report      Again, thank you for allowing me to participate in the care of your patient.        Sincerely,        Jamal Garcia MD

## 2022-12-22 ENCOUNTER — OFFICE VISIT (OUTPATIENT)
Dept: CARDIOLOGY | Facility: CLINIC | Age: 72
End: 2022-12-22
Payer: MEDICARE

## 2022-12-22 VITALS
HEART RATE: 68 BPM | WEIGHT: 102.7 LBS | DIASTOLIC BLOOD PRESSURE: 60 MMHG | RESPIRATION RATE: 16 BRPM | OXYGEN SATURATION: 99 % | SYSTOLIC BLOOD PRESSURE: 110 MMHG | HEIGHT: 63 IN | BODY MASS INDEX: 18.2 KG/M2

## 2022-12-22 DIAGNOSIS — Z95.5 S/P CORONARY ARTERY STENT PLACEMENT: Primary | Chronic | ICD-10-CM

## 2022-12-22 DIAGNOSIS — I25.10 ATHEROSCLEROSIS OF NATIVE CORONARY ARTERY OF NATIVE HEART WITHOUT ANGINA PECTORIS: ICD-10-CM

## 2022-12-22 DIAGNOSIS — R53.83 FATIGUE, UNSPECIFIED TYPE: ICD-10-CM

## 2022-12-22 DIAGNOSIS — E78.2 MIXED HYPERLIPIDEMIA: Chronic | ICD-10-CM

## 2022-12-22 PROCEDURE — 99214 OFFICE O/P EST MOD 30 MIN: CPT | Performed by: INTERNAL MEDICINE

## 2022-12-22 NOTE — LETTER
12/22/2022    Jeana Al MD  [address] [facility] [address]    RE: Sammi Guadalupe       Dear Colleague,     I had the pleasure of seeing Sammi Guadalupe in the [facility].      [facility]  [phone]        Assessment/Recommendations   Patient with known coronary artery disease status post stent placement in the LAD in 2012.  She has done well.  She does not have any chest discomfort but she had a very subtle warning system in the past.  She does note that she gets more fatigued after physical activity and has to rest more than she did a year ago.  Because of this I think it would be prudent to get a stress echocardiogram to look for any evidence of myocardial ischemia and to see what her exercise capacity is.  She is agreeable.    Her LDL cholesterol is at goal, her blood pressure is at goal and she takes an antiplatelet agent.    If the stress echocardiogram is unremarkable I do not think further cardiac testing is warranted but we will see her back in 1 year.    Thank you for allowing us to precipitate in her care.  30 minutes spent with chart review, patient visit and documentation.     History of Present Illness/Subjective    Ms. Sammi Guadalupe is a 72 year old female with known coronary artery disease, status post coronary artery stent to the left anterior descending in 2012.  He has had stress test since that time which were unremarkable but has not had any studies for a couple of years.  Over the last 6 months to 1 year she has noticed that after she exercises she is more fatigued and needs to rest more.  She was hiking in [place] and noticed that after her hikes she would have to rest for several hours.  This is new for her and the bumps are little bit.  She does not develop any shortness of breath or chest discomfort while active and she works out on a regular basis.  She denies orthopnea, paroxysmal nocturnal dyspnea, peripheral edema,  "syncope or near syncopal episodes.         Physical Examination Review of Systems   /60 (BP Location: Left arm, Patient Position: Sitting, Cuff Size: Adult Regular)   Pulse 68   Resp 16   Ht 1.6 m (5' 3\")   Wt 46.6 kg (102 lb 11.2 oz)   SpO2 99%   BMI 18.19 kg/m    Body mass index is 18.19 kg/m .  Wt Readings from Last 3 Encounters:   12/22/22 46.6 kg (102 lb 11.2 oz)   10/24/22 46.9 kg (103 lb 4.8 oz)   09/01/22 45.4 kg (100 lb)     General Appearance:   Alert, cooperative and in no acute distress.   ENT/Mouth: Patient wearing a mask.      EYES:  no scleral icterus, normal conjunctivae   Neck: JVP normal. No Hepatojugular reflux. Thyroid not visualized.   Chest/Lungs:   Lungs are clear to auscultation, equal chest wall expansion.   Cardiovascular:   S1, S2 without murmur ,clicks or rubs. Brachial, radial  pulses are intact and symetric. No carotid bruits noted   Abdomen:     Extremities: No cyanosis, clubbing or edema   Skin: no xanthelasma, warm.    Neurologic: normal arm movement bilateral, no tremors     Psychiatric: Appropriate affect.      Enc Vitals  BP: 110/60  Pulse: 68  Resp: 16  SpO2: 99 %  Weight: 46.6 kg (102 lb 11.2 oz)  Height: 160 cm (5' 3\")                                           Medical History  Surgical History Family History Social History   Past Medical History:   Diagnosis Date     ASHD (arteriosclerotic heart disease) 2012    CT scan, calcium score 428 lad artery deployment of a non-drug-eluting stent in the LAD     DVT (deep venous thrombosis) (H)      Family history of myocardial infarction     Past Surgical History:   Procedure Laterality Date     BACK SURGERY       IR MISCELLANEOUS PROCEDURE  1/27/2014     IR PORT REMOVAL  2/6/2015     LAMINECTOMY AND MICRODISCECTOMY CERVICAL SPINE N/A      LAPAROSCOPIC HYSTERECTOMY N/A 12/12/2016    Procedure: ROBOTIC TOTAL LAPAROSCOPIC HYSTERECTOMY, BILATERAL SALPINGO-OOPHORECTOMY, CYSTOSCOPY;  Surgeon: Shoan Ramirez MD;  Location: [facility];  Service:      MASTECTOMY Bilateral      RELEASE CARPAL TUNNEL Bilateral     Family History   Problem Relation Age of Onset     Cancer Mother      Coronary Artery Disease Mother      Cancer Father      Heart Disease Father      CABG Father      Heart Disease Sister     Social History     Socioeconomic History     Marital status: Single     Spouse name: Not on file     Number of children: Not on file     Years of education: Not on file     Highest education level: Not on file   Occupational History     Not on file   Tobacco Use     Smoking status: Former     Packs/day: 0.50     Years: 10.00     Pack years: 5.00     Types: Cigarettes     Smokeless tobacco: Never   Vaping Use     Vaping Use: Never used   Substance and Sexual Activity     Alcohol use: Yes     Alcohol/week: 5.0 standard drinks     Drug use: No     Sexual activity: Never   Other Topics Concern     Not on file   Social History Narrative     Not on file     Social Determinants of Health     Financial Resource Strain: Not on file   Food Insecurity: Not on file   Transportation Needs: Not on file   Physical Activity: Not on file   Stress: Not on file   Social Connections: Not on file   Intimate Partner Violence: Not on file   Housing Stability: Not on file          Medications  Allergies   Current Outpatient Medications   Medication Sig Dispense Refill     alendronate (FOSAMAX) 70 MG tablet Take 1 tablet (70 mg) by mouth every 7 days 12 tablet 1     aspirin 81 MG tablet [ASPIRIN 81 MG TABLET] Take 81 mg by mouth daily.       atorvastatin (LIPITOR) 40 MG tablet Take 1 tablet (40 mg) by mouth At Bedtime 90 tablet 3     b complex vitamins tablet [B COMPLEX VITAMINS TABLET] Take 1 tablet by mouth daily.       blood glucose test strips [BLOOD GLUCOSE TEST STRIPS] Dispense item covered by pt ins. Test 6 times a day       blood-glucose sensor (DEXCOM G6 SENSOR) Teresa [BLOOD-GLUCOSE SENSOR (DEXCOM G6 SENSOR) TERESA] USE AS DIRECTED FOR CONTINUOUS GLUCOSE  MONITORING CHANGE EVERY 10 DAYS       calcium carbonate (OS-PATTI) 600 mg (1,500 mg) tablet [CALCIUM CARBONATE (OS-PATTI) 600 MG (1,500 MG) TABLET] Take 1,200 mg by mouth bedtime. Plus magnesium        cholecalciferol, vitamin D3, (VITAMIN D3) 2,000 unit cap [CHOLECALCIFEROL, VITAMIN D3, (VITAMIN D3) 2,000 UNIT CAP] Take 2,000 Units by mouth bedtime.        Continuous Blood Gluc Transmit (DEXCOM G6 TRANSMITTER) MISC USE AS DIRECTED AND CHANGE EVERY 90 DAYS       insulin lispro (HUMALOG) 100 unit/mL injection [INSULIN LISPRO (HUMALOG) 100 UNIT/ML INJECTION] 20 Units daily.       irbesartan-hydrochlorothiazide (AVALIDE) 150-12.5 MG tablet Take 1 tablet by mouth daily 90 tablet 0     lysine 500 mg Tab [LYSINE 500 MG TAB] Take 500 mg by mouth 2 (two) times a day.       methylcellulose (CITRUCEL) powder        multivitamin therapeutic tablet [MULTIVITAMIN THERAPEUTIC TABLET] Take 1 tablet by mouth daily.      Allergies   Allergen Reactions     Nitroglycerin Other (See Comments)     Blood Pressure drops significantly.     Sulfa (Sulfonamide Antibiotics) [Sulfa Drugs] Dizziness     Dizziness and doesn't feel normal.         Lab Results    Chemistry/lipid CBC Cardiac Enzymes/BNP/TSH/INR   Lab Results   Component Value Date    CHOL 152 10/24/2022    HDL 93 10/24/2022    TRIG 50 10/24/2022    BUN 18.6 10/24/2022     10/24/2022    CO2 27 10/24/2022    Lab Results   Component Value Date    WBC 7.4 10/21/2021    HGB 14.1 10/21/2021    HCT 43.0 10/21/2021    MCV 96 10/21/2021     10/21/2021    Lab Results   Component Value Date    TSH 2.16 10/24/2022                Thank you for allowing me to participate in the care of your patient.      Sincerely,     Rolf Gipson MD     [facility]  cc:   Rolf Gipson MD  [address]  [place] [address]

## 2022-12-22 NOTE — PROGRESS NOTES
[facility]  [phone]        Assessment/Recommendations   Patient with known coronary artery disease status post stent placement in the LAD in 2012.  She has done well.  She does not have any chest discomfort but she had a very subtle warning system in the past.  She does note that she gets more fatigued after physical activity and has to rest more than she did a year ago.  Because of this I think it would be prudent to get a stress echocardiogram to look for any evidence of myocardial ischemia and to see what her exercise capacity is.  She is agreeable.    Her LDL cholesterol is at goal, her blood pressure is at goal and she takes an antiplatelet agent.    If the stress echocardiogram is unremarkable I do not think further cardiac testing is warranted but we will see her back in 1 year.    Thank you for allowing us to precipitate in her care.  30 minutes spent with chart review, patient visit and documentation.     History of Present Illness/Subjective    Ms. Sammi Guadalupe is a 72 year old female with known coronary artery disease, status post coronary artery stent to the left anterior descending in 2012.  He has had stress test since that time which were unremarkable but has not had any studies for a couple of years.  Over the last 6 months to 1 year she has noticed that after she exercises she is more fatigued and needs to rest more.  She was hiking in [place] and noticed that after her hikes she would have to rest for several hours.  This is new for her and the bumps are little bit.  She does not develop any shortness of breath or chest discomfort while active and she works out on a regular basis.  She denies orthopnea, paroxysmal nocturnal dyspnea, peripheral edema, syncope or near syncopal episodes.         Physical Examination Review of Systems   /60 (BP Location: Left arm, Patient Position: Sitting, Cuff Size: Adult Regular)   Pulse 68   Resp 16   Ht 1.6 m (5'  "3\")   Wt 46.6 kg (102 lb 11.2 oz)   SpO2 99%   BMI 18.19 kg/m    Body mass index is 18.19 kg/m .  Wt Readings from Last 3 Encounters:   12/22/22 46.6 kg (102 lb 11.2 oz)   10/24/22 46.9 kg (103 lb 4.8 oz)   09/01/22 45.4 kg (100 lb)     General Appearance:   Alert, cooperative and in no acute distress.   ENT/Mouth: Patient wearing a mask.      EYES:  no scleral icterus, normal conjunctivae   Neck: JVP normal. No Hepatojugular reflux. Thyroid not visualized.   Chest/Lungs:   Lungs are clear to auscultation, equal chest wall expansion.   Cardiovascular:   S1, S2 without murmur ,clicks or rubs. Brachial, radial  pulses are intact and symetric. No carotid bruits noted   Abdomen:     Extremities: No cyanosis, clubbing or edema   Skin: no xanthelasma, warm.    Neurologic: normal arm movement bilateral, no tremors     Psychiatric: Appropriate affect.      Enc Vitals  BP: 110/60  Pulse: 68  Resp: 16  SpO2: 99 %  Weight: 46.6 kg (102 lb 11.2 oz)  Height: 160 cm (5' 3\")                                           Medical History  Surgical History Family History Social History   Past Medical History:   Diagnosis Date     ASHD (arteriosclerotic heart disease) 2012    CT scan, calcium score 428 lad artery deployment of a non-drug-eluting stent in the LAD     DVT (deep venous thrombosis) (H)      Family history of myocardial infarction     Past Surgical History:   Procedure Laterality Date     BACK SURGERY       IR MISCELLANEOUS PROCEDURE  1/27/2014     IR PORT REMOVAL  2/6/2015     LAMINECTOMY AND MICRODISCECTOMY CERVICAL SPINE N/A      LAPAROSCOPIC HYSTERECTOMY N/A 12/12/2016    Procedure: ROBOTIC TOTAL LAPAROSCOPIC HYSTERECTOMY, BILATERAL SALPINGO-OOPHORECTOMY, CYSTOSCOPY;  Surgeon: Sohan Ramirez MD;  Location: [facility] OR;  Service:      MASTECTOMY Bilateral      RELEASE CARPAL TUNNEL Bilateral     Family History   Problem Relation Age of Onset     Cancer Mother      Coronary Artery Disease Mother      Cancer Father  "     Heart Disease Father      CABG Father      Heart Disease Sister     Social History     Socioeconomic History     Marital status: Single     Spouse name: Not on file     Number of children: Not on file     Years of education: Not on file     Highest education level: Not on file   Occupational History     Not on file   Tobacco Use     Smoking status: Former     Packs/day: 0.50     Years: 10.00     Pack years: 5.00     Types: Cigarettes     Smokeless tobacco: Never   Vaping Use     Vaping Use: Never used   Substance and Sexual Activity     Alcohol use: Yes     Alcohol/week: 5.0 standard drinks     Drug use: No     Sexual activity: Never   Other Topics Concern     Not on file   Social History Narrative     Not on file     Social Determinants of Health     Financial Resource Strain: Not on file   Food Insecurity: Not on file   Transportation Needs: Not on file   Physical Activity: Not on file   Stress: Not on file   Social Connections: Not on file   Intimate Partner Violence: Not on file   Housing Stability: Not on file          Medications  Allergies   Current Outpatient Medications   Medication Sig Dispense Refill     alendronate (FOSAMAX) 70 MG tablet Take 1 tablet (70 mg) by mouth every 7 days 12 tablet 1     aspirin 81 MG tablet [ASPIRIN 81 MG TABLET] Take 81 mg by mouth daily.       atorvastatin (LIPITOR) 40 MG tablet Take 1 tablet (40 mg) by mouth At Bedtime 90 tablet 3     b complex vitamins tablet [B COMPLEX VITAMINS TABLET] Take 1 tablet by mouth daily.       blood glucose test strips [BLOOD GLUCOSE TEST STRIPS] Dispense item covered by pt ins. Test 6 times a day       blood-glucose sensor (DEXCOM G6 SENSOR) Teresa [BLOOD-GLUCOSE SENSOR (DEXCOM G6 SENSOR) TERESA] USE AS DIRECTED FOR CONTINUOUS GLUCOSE MONITORING CHANGE EVERY 10 DAYS       calcium carbonate (OS-PATTI) 600 mg (1,500 mg) tablet [CALCIUM CARBONATE (OS-PATTI) 600 MG (1,500 MG) TABLET] Take 1,200 mg by mouth bedtime. Plus magnesium         cholecalciferol, vitamin D3, (VITAMIN D3) 2,000 unit cap [CHOLECALCIFEROL, VITAMIN D3, (VITAMIN D3) 2,000 UNIT CAP] Take 2,000 Units by mouth bedtime.        Continuous Blood Gluc Transmit (DEXCOM G6 TRANSMITTER) MISC USE AS DIRECTED AND CHANGE EVERY 90 DAYS       insulin lispro (HUMALOG) 100 unit/mL injection [INSULIN LISPRO (HUMALOG) 100 UNIT/ML INJECTION] 20 Units daily.       irbesartan-hydrochlorothiazide (AVALIDE) 150-12.5 MG tablet Take 1 tablet by mouth daily 90 tablet 0     lysine 500 mg Tab [LYSINE 500 MG TAB] Take 500 mg by mouth 2 (two) times a day.       methylcellulose (CITRUCEL) powder        multivitamin therapeutic tablet [MULTIVITAMIN THERAPEUTIC TABLET] Take 1 tablet by mouth daily.      Allergies   Allergen Reactions     Nitroglycerin Other (See Comments)     Blood Pressure drops significantly.     Sulfa (Sulfonamide Antibiotics) [Sulfa Drugs] Dizziness     Dizziness and doesn't feel normal.         Lab Results    Chemistry/lipid CBC Cardiac Enzymes/BNP/TSH/INR   Lab Results   Component Value Date    CHOL 152 10/24/2022    HDL 93 10/24/2022    TRIG 50 10/24/2022    BUN 18.6 10/24/2022     10/24/2022    CO2 27 10/24/2022    Lab Results   Component Value Date    WBC 7.4 10/21/2021    HGB 14.1 10/21/2021    HCT 43.0 10/21/2021    MCV 96 10/21/2021     10/21/2021    Lab Results   Component Value Date    TSH 2.16 10/24/2022

## 2023-01-05 ENCOUNTER — HOSPITAL ENCOUNTER (OUTPATIENT)
Dept: CARDIOLOGY | Facility: CLINIC | Age: 73
Discharge: HOME | End: 2023-01-05
Attending: INTERNAL MEDICINE | Admitting: INTERNAL MEDICINE
Payer: MEDICARE

## 2023-01-05 DIAGNOSIS — I25.10 ATHEROSCLEROSIS OF NATIVE CORONARY ARTERY OF NATIVE HEART WITHOUT ANGINA PECTORIS: ICD-10-CM

## 2023-01-05 DIAGNOSIS — E78.2 MIXED HYPERLIPIDEMIA: Chronic | ICD-10-CM

## 2023-01-05 DIAGNOSIS — Z95.5 S/P CORONARY ARTERY STENT PLACEMENT: Chronic | ICD-10-CM

## 2023-01-05 PROCEDURE — 93321 DOPPLER ECHO F-UP/LMTD STD: CPT | Mod: 26 | Performed by: INTERNAL MEDICINE

## 2023-01-05 PROCEDURE — 93350 STRESS TTE ONLY: CPT | Mod: 26 | Performed by: INTERNAL MEDICINE

## 2023-01-05 PROCEDURE — 93321 DOPPLER ECHO F-UP/LMTD STD: CPT | Mod: TC

## 2023-01-05 PROCEDURE — 93018 CV STRESS TEST I&R ONLY: CPT | Performed by: INTERNAL MEDICINE

## 2023-01-05 PROCEDURE — 93325 DOPPLER ECHO COLOR FLOW MAPG: CPT | Mod: 26 | Performed by: INTERNAL MEDICINE

## 2023-01-05 PROCEDURE — 93016 CV STRESS TEST SUPVJ ONLY: CPT | Performed by: INTERNAL MEDICINE

## 2023-01-10 ENCOUNTER — TRANSFERRED RECORDS (OUTPATIENT)
Dept: HEALTH INFORMATION MANAGEMENT | Facility: CLINIC | Age: 73
End: 2023-01-10

## 2023-01-24 DIAGNOSIS — I25.10 ASHD (ARTERIOSCLEROTIC HEART DISEASE): ICD-10-CM

## 2023-01-24 DIAGNOSIS — I10 ESSENTIAL HYPERTENSION: ICD-10-CM

## 2023-01-25 RX ORDER — IRBESARTAN AND HYDROCHLOROTHIAZIDE 150; 12.5 MG/1; MG/1
1 TABLET, FILM COATED ORAL DAILY
Qty: 90 TABLET | Refills: 2 | Status: SHIPPED | OUTPATIENT
Start: 2023-01-25 | End: 2023-10-26

## 2023-01-25 RX ORDER — ATORVASTATIN CALCIUM 40 MG/1
40 TABLET, FILM COATED ORAL AT BEDTIME
Qty: 90 TABLET | Refills: 3 | Status: SHIPPED | OUTPATIENT
Start: 2023-01-25 | End: 2023-10-26

## 2023-01-25 NOTE — TELEPHONE ENCOUNTER
"Routing refill request to provider for review/approval because:  A break in medication    Last Written Prescription Date:  10/21/21  Last Fill Quantity: 90,  # refills: 3   Last office visit provider:  10/24/22     Requested Prescriptions   Pending Prescriptions Disp Refills     atorvastatin (LIPITOR) 40 MG tablet 90 tablet 3     Sig: Take 1 tablet (40 mg) by mouth At Bedtime       Statins Protocol Passed - 1/24/2023  9:46 AM        Passed - LDL on file in past 12 months     Recent Labs   Lab Test 10/24/22  1145   LDL 49             Passed - No abnormal creatine kinase in past 12 months     No lab results found.             Passed - Recent (12 mo) or future (30 days) visit within the authorizing provider's specialty     Patient has had an office visit with the authorizing provider or a provider within the authorizing providers department within the previous 12 mos or has a future within next 30 days. See \"Patient Info\" tab in inCreatorBoxet, or \"Choose Columns\" in Meds & Orders section of the refill encounter.              Passed - Medication is active on med list        Passed - Patient is age 18 or older        Passed - No active pregnancy on record        Passed - No positive pregnancy test in past 12 months         Signed Prescriptions Disp Refills    irbesartan-hydrochlorothiazide (AVALIDE) 150-12.5 MG tablet 90 tablet 2     Sig: Take 1 tablet by mouth daily       Angiotensin-II Receptors Passed - 1/25/2023  8:44 AM        Passed - Last blood pressure under 140/90 in past 12 months     BP Readings from Last 3 Encounters:   12/22/22 110/60   10/24/22 112/62   04/28/22 137/83                 Passed - Recent (12 mo) or future (30 days) visit within the authorizing provider's specialty     Patient has had an office visit with the authorizing provider or a provider within the authorizing providers department within the previous 12 mos or has a future within next 30 days. See \"Patient Info\" tab in in[employer]Box[employer], or \"Choose " "Columns\" in Meds & Orders section of the refill encounter.              Passed - Medication is active on med list        Passed - Patient is age 18 or older        Passed - No active pregnancy on record        Passed - Normal serum creatinine on file in past 12 months     Recent Labs   Lab Test 10/24/22  1145   CR 0.57       Ok to refill medication if creatinine is low          Passed - Normal serum potassium on file in past 12 months     Recent Labs   Lab Test 10/24/22  1145   POTASSIUM 3.8                    Passed - No positive pregnancy test in past 12 months       Diuretics (Including Combos) Protocol Passed - 1/25/2023  8:44 AM        Passed - Blood pressure under 140/90 in past 12 months     BP Readings from Last 3 Encounters:   12/22/22 110/60   10/24/22 112/62   04/28/22 137/83                 Passed - Recent (12 mo) or future (30 days) visit within the authorizing provider's specialty     Patient has had an office visit with the authorizing provider or a provider within the authorizing providers department within the previous 12 mos or has a future within next 30 days. See \"Patient Info\" tab in inbasket, or \"Choose Columns\" in Meds & Orders section of the refill encounter.              Passed - Medication is active on med list        Passed - Patient is age 18 or older        Passed - No active pregancy on record        Passed - Normal serum creatinine on file in past 12 months     Recent Labs   Lab Test 10/24/22  1145   CR 0.57              Passed - Normal serum potassium on file in past 12 months     Recent Labs   Lab Test 10/24/22  1145   POTASSIUM 3.8                    Passed - Normal serum sodium on file in past 12 months     Recent Labs   Lab Test 10/24/22  1145                 Passed - No positive pregnancy test in past 12 months             Johny Del Toro RN 01/25/23 8:45 AM  "

## 2023-01-25 NOTE — TELEPHONE ENCOUNTER
"Last Written Prescription Date:  10/3/22  Last Fill Quantity: 90,  # refills: 0   Last office visit provider:  10/24/22     Requested Prescriptions   Pending Prescriptions Disp Refills     atorvastatin (LIPITOR) 40 MG tablet 90 tablet 3     Sig: Take 1 tablet (40 mg) by mouth At Bedtime       Statins Protocol Passed - 1/24/2023  9:46 AM        Passed - LDL on file in past 12 months     Recent Labs   Lab Test 10/24/22  1145   LDL 49             Passed - No abnormal creatine kinase in past 12 months     No lab results found.             Passed - Recent (12 mo) or future (30 days) visit within the authorizing provider's specialty     Patient has had an office visit with the authorizing provider or a provider within the authorizing providers department within the previous 12 mos or has a future within next 30 days. See \"Patient Info\" tab in inbasket, or \"Choose Columns\" in Meds & Orders section of the refill encounter.              Passed - Medication is active on med list        Passed - Patient is age 18 or older        Passed - No active pregnancy on record        Passed - No positive pregnancy test in past 12 months           irbesartan-hydrochlorothiazide (AVALIDE) 150-12.5 MG tablet 90 tablet 0     Sig: Take 1 tablet by mouth daily       Angiotensin-II Receptors Passed - 1/25/2023  8:44 AM        Passed - Last blood pressure under 140/90 in past 12 months     BP Readings from Last 3 Encounters:   12/22/22 110/60   10/24/22 112/62   04/28/22 137/83                 Passed - Recent (12 mo) or future (30 days) visit within the authorizing provider's specialty     Patient has had an office visit with the authorizing provider or a provider within the authorizing providers department within the previous 12 mos or has a future within next 30 days. See \"Patient Info\" tab in inbasket, or \"Choose Columns\" in Meds & Orders section of the refill encounter.              Passed - Medication is active on med list        Passed - " "Patient is age 18 or older        Passed - No active pregnancy on record        Passed - Normal serum creatinine on file in past 12 months     Recent Labs   Lab Test 10/24/22  1145   CR 0.57       Ok to refill medication if creatinine is low          Passed - Normal serum potassium on file in past 12 months     Recent Labs   Lab Test 10/24/22  1145   POTASSIUM 3.8                    Passed - No positive pregnancy test in past 12 months       Diuretics (Including Combos) Protocol Passed - 1/25/2023  8:44 AM        Passed - Blood pressure under 140/90 in past 12 months     BP Readings from Last 3 Encounters:   12/22/22 110/60   10/24/22 112/62   04/28/22 137/83                 Passed - Recent (12 mo) or future (30 days) visit within the authorizing provider's specialty     Patient has had an office visit with the authorizing provider or a provider within the authorizing providers department within the previous 12 mos or has a future within next 30 days. See \"Patient Info\" tab in inbasket, or \"Choose Columns\" in Meds & Orders section of the refill encounter.              Passed - Medication is active on med list        Passed - Patient is age 18 or older        Passed - No active pregancy on record        Passed - Normal serum creatinine on file in past 12 months     Recent Labs   Lab Test 10/24/22  1145   CR 0.57              Passed - Normal serum potassium on file in past 12 months     Recent Labs   Lab Test 10/24/22  1145   POTASSIUM 3.8                    Passed - Normal serum sodium on file in past 12 months     Recent Labs   Lab Test 10/24/22  1145                 Passed - No positive pregnancy test in past 12 months             Johny Del Toro RN 01/25/23 8:45 AM  "

## 2023-05-15 ENCOUNTER — TRANSFERRED RECORDS (OUTPATIENT)
Dept: HEALTH INFORMATION MANAGEMENT | Facility: CLINIC | Age: 73
End: 2023-05-15
Payer: MEDICARE

## 2023-05-22 DIAGNOSIS — M85.89 OSTEOPENIA OF MULTIPLE SITES: ICD-10-CM

## 2023-05-22 NOTE — TELEPHONE ENCOUNTER
"Routing refill request to provider for review/approval because:  Dexa out of date    Last Written Prescription Date:  10/24/2022  Last Fill Quantity: 12,  # refills: 1   Last office visit provider:  10/24/2022     Requested Prescriptions   Pending Prescriptions Disp Refills     alendronate (FOSAMAX) 70 MG tablet 12 tablet 1     Sig: Take 1 tablet (70 mg) by mouth every 7 days       Bisphosphonates Failed - 5/22/2023  8:03 AM        Failed - Dexa on file within past 2 years     Please review last Dexa result.           Passed - Recent (12 mo) or future (30 days) visit within the authorizing provider's specialty     Patient has had an office visit with the authorizing provider or a provider within the authorizing providers department within the previous 12 mos or has a future within next 30 days. See \"Patient Info\" tab in inbasket, or \"Choose Columns\" in Meds & Orders section of the refill encounter.              Passed - Medication is active on med list        Passed - Patient is age 18 or older        Passed - Normal serum creatinine on file within past 12 months     Recent Labs   Lab Test 10/24/22  1145   CR 0.57       Ok to refill medication if creatinine is low               Yessy Sanchez, RN 05/22/23 3:28 PM  "

## 2023-05-23 RX ORDER — ALENDRONATE SODIUM 70 MG/1
70 TABLET ORAL
Qty: 12 TABLET | Refills: 1 | Status: SHIPPED | OUTPATIENT
Start: 2023-05-23 | End: 2023-09-13

## 2023-06-08 ENCOUNTER — VIRTUAL VISIT (OUTPATIENT)
Dept: INTERNAL MEDICINE | Facility: CLINIC | Age: 73
End: 2023-06-08
Payer: MEDICARE

## 2023-06-08 DIAGNOSIS — I10 ESSENTIAL HYPERTENSION: ICD-10-CM

## 2023-06-08 DIAGNOSIS — C50.511 MALIGNANT NEOPLASM OF LOWER-OUTER QUADRANT OF RIGHT BREAST OF FEMALE, ESTROGEN RECEPTOR POSITIVE (H): ICD-10-CM

## 2023-06-08 DIAGNOSIS — Z91.89 RISK OF EXPOSURE TO LYME DISEASE: Primary | ICD-10-CM

## 2023-06-08 DIAGNOSIS — E10.9 CONTROLLED TYPE 1 DIABETES MELLITUS WITHOUT COMPLICATION (H): ICD-10-CM

## 2023-06-08 DIAGNOSIS — Z00.00 PREVENTATIVE HEALTH CARE: ICD-10-CM

## 2023-06-08 DIAGNOSIS — Z17.0 MALIGNANT NEOPLASM OF LOWER-OUTER QUADRANT OF RIGHT BREAST OF FEMALE, ESTROGEN RECEPTOR POSITIVE (H): ICD-10-CM

## 2023-06-08 PROBLEM — Z86.718 HISTORY OF DVT (DEEP VEIN THROMBOSIS): Status: ACTIVE | Noted: 2022-06-05

## 2023-06-08 PROBLEM — E46 PROTEIN-CALORIE MALNUTRITION (H): Status: RESOLVED | Noted: 2022-06-05 | Resolved: 2023-06-08

## 2023-06-08 PROBLEM — E10.8 TYPE 1 DIABETES MELLITUS WITH COMPLICATION (H): Status: RESOLVED | Noted: 2020-03-14 | Resolved: 2023-06-08

## 2023-06-08 PROBLEM — C50.919 MALIGNANT NEOPLASM OF BREAST (H): Status: RESOLVED | Noted: 2017-11-03 | Resolved: 2023-06-08

## 2023-06-08 PROCEDURE — 99214 OFFICE O/P EST MOD 30 MIN: CPT | Mod: 95 | Performed by: INTERNAL MEDICINE

## 2023-06-08 RX ORDER — DOXYCYCLINE HYCLATE 100 MG
200 TABLET ORAL
Qty: 20 TABLET | Refills: 0 | Status: SHIPPED | OUTPATIENT
Start: 2023-06-08 | End: 2023-10-26

## 2023-06-08 NOTE — PROGRESS NOTES
Sammi is a 72 year old female contacting the clinic today via video, who will use the platform: [employer] for the visit.  Phone # for [employer], or if Amwell drops:   Telephone Information:   Mobile [phone]          ASSESSMENT and PLAN:  1. Risk of exposure to Lyme disease  Provide doxycycline, now within 48 hours of attachment and larger quantities for future predictable episodes  - doxycycline hyclate (VIBRA-TABS) 100 MG tablet; Take 2 tablets (200 mg) by mouth once as needed (lyme/deer tick exposure)  Dispense: 20 tablet; Refill: 0    2. Controlled type 1 diabetes mellitus without complication (H)  Stable.  Labs up-to-date    3. Preventative health care  Reviewed and up-to-date  - REVIEW OF HEALTH MAINTENANCE PROTOCOL ORDERS    4. Malignant neoplasm of lower-outer quadrant of right breast of female, estrogen receptor positive (H)  Reviewed greater than 10 years ago.    5. Essential hypertension  Stable and reviewed medication       Patient Instructions   Doxycycline 200 mg x 1, for deer tick exposure, with extras for future events    Medicines reviewed    Problem list corrected    Health maintenance reviewed    Tetanus booster reviewed            Return in about 6 months (around 12/8/2023) for using a video visit.       CHIEF COMPLAINT:  Chief Complaint   Patient presents with     Tick Bite     Deer tick bite (found/removed 2 ticks -shoulder/biceps) swelling/redness        HISTORY OF PRESENT ILLNESS:  Sammi is a 72 year old female contacting the clinic today via video for reports of tick exposure.  She has had Lyme disease in the past.  She is exposed to ticks in her backyard and last night picked off to ticks which were embedded.  Slight surrounding erythema as per usual.  Has had erythema chronicum migrans and Lyme disease in the past.  He is certain that these tics have been exposed and just the last few days and have been on for less than 48 hours.  Discussed doxycycline prevention for exposure,  "doxycycline duration for erythema chronicum migrans, and doxycycline duration for Lyme disease.    Diabetes and high blood pressure stable.  Medicines reviewed    History of breast cancer, chemotherapy, port, and DVT many years ago.  Clarify problem list to indicate history of DVT and corrected    History of Present Illness       Reason for visit:  Tick Bites  Symptom onset:  1-3 days ago  Symptoms include:  Redness and Swelling  Symptom intensity:  Mild  Symptom progression:  Staying the same  Had these symptoms before:  Yes  Has tried/received treatment for these symptoms:  Yes  Previous treatment was successful:  Yes  Prior treatment description:  Doxycycline, etc  What makes it worse:  N/A  What makes it better:  N/A    She eats 2-3 servings of fruits and vegetables daily.She consumes 0 sweetened beverage(s) daily.She exercises with enough effort to increase her heart rate 30 to 60 minutes per day.  She exercises with enough effort to increase her heart rate 4 days per week.   She is taking medications regularly.      REVIEW OF SYSTEMS:   Otherwise feels well    PFSH:  Social History     Social History Narrative     Not on file         TOBACCO USE:  History   Smoking Status     Former     Packs/day: 0.50     Years: 10.00     Types: Cigarettes   Smokeless Tobacco     Never       VITALS:  There were no vitals filed for this visit.  LMP  (LMP Unknown)  Estimated body mass index is 18.19 kg/m  as calculated from the following:    Height as of 12/22/22: 1.6 m (5' 3\").    Weight as of 12/22/22: 46.6 kg (102 lb 11.2 oz).    PHYSICAL EXAM:  (observations via Video)  Alert and oriented    MEDICATIONS:   Current Outpatient Medications   Medication Sig Dispense Refill     alendronate (FOSAMAX) 70 MG tablet Take 1 tablet (70 mg) by mouth every 7 days 12 tablet 1     aspirin 81 MG tablet [ASPIRIN 81 MG TABLET] Take 81 mg by mouth daily.       atorvastatin (LIPITOR) 40 MG tablet Take 1 tablet (40 mg) by mouth At Bedtime 90 " tablet 3     b complex vitamins tablet [B COMPLEX VITAMINS TABLET] Take 1 tablet by mouth daily.       blood glucose test strips [BLOOD GLUCOSE TEST STRIPS] Dispense item covered by pt ins. Test 6 times a day       blood-glucose sensor (DEXCOM G6 SENSOR) Teresa [BLOOD-GLUCOSE SENSOR (DEXCOM G6 SENSOR) TERESA] USE AS DIRECTED FOR CONTINUOUS GLUCOSE MONITORING CHANGE EVERY 10 DAYS       calcium carbonate (OS-PATTI) 600 mg (1,500 mg) tablet [CALCIUM CARBONATE (OS-PATTI) 600 MG (1,500 MG) TABLET] Take 1,200 mg by mouth bedtime. Plus magnesium        cholecalciferol, vitamin D3, (VITAMIN D3) 2,000 unit cap [CHOLECALCIFEROL, VITAMIN D3, (VITAMIN D3) 2,000 UNIT CAP] Take 2,000 Units by mouth bedtime.        Continuous Blood Gluc Transmit (DEXCOM G6 TRANSMITTER) MISC USE AS DIRECTED AND CHANGE EVERY 90 DAYS       doxycycline hyclate (VIBRA-TABS) 100 MG tablet Take 2 tablets (200 mg) by mouth once as needed (lyme/deer tick exposure) 20 tablet 0     insulin lispro (HUMALOG) 100 unit/mL injection [INSULIN LISPRO (HUMALOG) 100 UNIT/ML INJECTION] 20 Units daily.       irbesartan-hydrochlorothiazide (AVALIDE) 150-12.5 MG tablet Take 1 tablet by mouth daily 90 tablet 2     lysine 500 mg Tab [LYSINE 500 MG TAB] Take 500 mg by mouth 2 (two) times a day.       methylcellulose (CITRUCEL) powder        multivitamin therapeutic tablet [MULTIVITAMIN THERAPEUTIC TABLET] Take 1 tablet by mouth daily.         Outside Notes summarized:   Labs, x-rays, cardiology, GI tests reviewed: As below  Recent Labs   Lab Test 10/24/22  1145 01/31/22  1401 10/21/21  0936   HGB  --   --  14.1   WBC  --   --  7.4     --  140   POTASSIUM 3.8  --  4.0   CR 0.57  --  0.66   TSH 2.16  --  2.62   VITDT 70  --  47   SED  --  2  --    CRP  --  <0.1  --      No results found for: [id number]  Lab Results   Component Value Date    CHOL 152 10/24/2022     New orders:   Orders Placed This Encounter   Procedures     REVIEW OF HEALTH MAINTENANCE PROTOCOL ORDERS        Independent review of:       Danial Jernigan MD  [facility]    Video-Visit Details  Type of service:  Video Visit  Patient has given verbal consent to a Video visit?  Yes  How would you like to obtain your AVS?  MyChart  Will anyone else be joining your video visit, giving supplemental history? No    Originating location (pt location): Home    Distant Location (provider location):  Off-site    Video Start Time: 10:44 AM  Video End time:  11:08 AM  Face to face plus orders: 24 minutes  Documentation time:  3 minutes     The visit lasted a total of 27 minutes

## 2023-06-08 NOTE — PATIENT INSTRUCTIONS
Doxycycline 200 mg x 1, for deer tick exposure, with extras for future events    Medicines reviewed    Problem list corrected    Health maintenance reviewed    Tetanus booster reviewed

## 2023-08-07 ENCOUNTER — TRANSFERRED RECORDS (OUTPATIENT)
Dept: HEALTH INFORMATION MANAGEMENT | Facility: CLINIC | Age: 73
End: 2023-08-07
Payer: MEDICARE

## 2023-09-06 ENCOUNTER — NURSE TRIAGE (OUTPATIENT)
Dept: NURSING | Facility: CLINIC | Age: 73
End: 2023-09-06
Payer: MEDICARE

## 2023-09-06 ENCOUNTER — TELEPHONE (OUTPATIENT)
Dept: INTERNAL MEDICINE | Facility: CLINIC | Age: 73
End: 2023-09-06
Payer: MEDICARE

## 2023-09-06 NOTE — TELEPHONE ENCOUNTER
Very unusual to experience anxiety as a direct effect of allendronate but she should still stop it and make an appointment to see me in 1-2 weeks so that if she has ongoing symptoms that can be discussed and alternates also discussed . It can be a virtual appointment

## 2023-09-06 NOTE — TELEPHONE ENCOUNTER
Spoke with patient and relayed information below from Dr Al. Patient verbalized understanding and agrees with the plan. Appointment scheduled per her preference.

## 2023-09-06 NOTE — TELEPHONE ENCOUNTER
Nurse Triage SBAR    Is this a 2nd Level Triage? YES, LICENSED PRACTITIONER REVIEW IS REQUIRED    Situation: Taking alondronate and now having anxiety.    Background: Takes alondronate on Monday and then immediately gets terribly anxious; reports this is getting worse and worse. Patient wondering if she can get put on a different medication or try something else because the anxiety is so severe.     Assessment: Patient currently feeling very anxious, pacing the floor, heart is not racing, no trouble breathing, no thoughts of suicide. Patient states not concerned about unstable symptoms as much as wanting medication change so she doesn't deal with this repeatedly.     Protocol Recommended Disposition:   Call PCP Now    Recommendation: Will route high priority message to provider as clinic hours are 7AM-6PM. Instructed patient she should receive a call back within the next 2 hours. Encouraged to call back if she does not hear back from the provider this morning or if symptoms of anxiety worsen.     Destiny Mota RN on 9/6/2023 at 7:16 AM      Reason for Disposition   [1] Caller has URGENT medicine question about med that PCP or specialist prescribed AND [2] triager unable to answer question    Protocols used: Medication Question Call-A-

## 2023-09-06 NOTE — TELEPHONE ENCOUNTER
General Call    Contacts         Type Contact Phone/Fax    09/06/2023 10:39 AM CDT Phone (Incoming) Sammi Guadalupe (Self) [phone] (M)          Reason for Call:  Medication Question    What are your questions or concerns:  Patient called earlier this morning(see Nurse Triage encounter), and would like a call from Dr. Al or a nurse on her  team.  Patient was offered the option of trying to schedule with a different provider, patient declined, and would like to know if Dr. Al can squeeze her in for a telephone appointment today.    Date of last appointment with provider: n/a    prefer to receive a phone call?:   Patient would prefer a phone call     Okay to leave a detailed message?: Yes at Cell number on file:    Telephone Information:   Mobile [phone]

## 2023-09-13 ENCOUNTER — VIRTUAL VISIT (OUTPATIENT)
Dept: INTERNAL MEDICINE | Facility: CLINIC | Age: 73
End: 2023-09-13
Payer: MEDICARE

## 2023-09-13 DIAGNOSIS — M81.0 AGE-RELATED OSTEOPOROSIS WITHOUT CURRENT PATHOLOGICAL FRACTURE: ICD-10-CM

## 2023-09-13 DIAGNOSIS — M85.80 OSTEOPENIA, UNSPECIFIED LOCATION: Primary | ICD-10-CM

## 2023-09-13 PROCEDURE — 99442 PR PHYSICIAN TELEPHONE EVALUATION 11-20 MIN: CPT | Mod: 95 | Performed by: INTERNAL MEDICINE

## 2023-09-13 NOTE — PROGRESS NOTES
Sammi is a 72 year old who is being evaluated via a billable telephone visit.      What phone number would you like to be contacted at? [phone]   How would you like to obtain your AVS? Ruiz    Distant Location (provider location):  On-site    Assessment & Plan     Osteopenia, unspecified location  Given her definite timeline even though the anxiety is not a typical side effect of Fosamax we can stop it and give her drug holiday and see how she does.  The other concern is whether she even needs Fosamax she was only given Fosamax but she had been on Prolia previously for Arimidex induced osteopenia she has never had osteoporosis.  We will recheck bone density scan to see if her bone density is stable and then proceed from there  - DX Hip/Pelvis/Spine; Future    Age-related osteoporosis without current pathological fracture    - DX Hip/Pelvis/Spine; Future                 MD JOHN Pineda [facility]   Sammi is a 72 year old, presenting for the following health issues:  Recheck Medication (Patient would like to discuss alternatives to alendronate.)      9/13/2023    10:58 AM   Additional Questions   Roomed by Kate LOPEZ       History of Present Illness       Reason for visit:  Intolerance of alondrenate    She eats 4 or more servings of fruits and vegetables daily.She consumes 0 sweetened beverage(s) daily.She exercises with enough effort to increase her heart rate 60 or more minutes per day.  She exercises with enough effort to increase her heart rate 7 days per week.   She is taking medications regularly.               Review of Systems         Objective    Vitals - Patient Reported  Systolic (Patient Reported):  (pt does not monitor)  Pain Score: No Pain (0)        Physical Exam   healthy, alert, and no distress  PSYCH: Alert and oriented times 3; coherent speech, normal   rate and volume, able to articulate logical thoughts, able   to abstract reason, no  tangential thoughts, no hallucinations   or delusions  Her affect is normal  RESP: No cough, no audible wheezing, able to talk in full sentences  Remainder of exam unable to be completed due to telephone visits                Phone call duration: 12 minutes

## 2023-09-23 ENCOUNTER — HEALTH MAINTENANCE LETTER (OUTPATIENT)
Age: 73
End: 2023-09-23

## 2023-10-16 ENCOUNTER — TRANSFERRED RECORDS (OUTPATIENT)
Dept: HEALTH INFORMATION MANAGEMENT | Facility: CLINIC | Age: 73
End: 2023-10-16
Payer: MEDICARE

## 2023-10-17 ENCOUNTER — ANCILLARY PROCEDURE (OUTPATIENT)
Dept: BONE DENSITY | Facility: CLINIC | Age: 73
End: 2023-10-17
Attending: INTERNAL MEDICINE
Payer: MEDICARE

## 2023-10-17 DIAGNOSIS — M85.80 OSTEOPENIA, UNSPECIFIED LOCATION: ICD-10-CM

## 2023-10-17 DIAGNOSIS — M81.0 AGE-RELATED OSTEOPOROSIS WITHOUT CURRENT PATHOLOGICAL FRACTURE: ICD-10-CM

## 2023-10-17 PROCEDURE — 77080 DXA BONE DENSITY AXIAL: CPT | Mod: TC | Performed by: PHYSICIAN ASSISTANT

## 2023-10-18 ENCOUNTER — TRANSFERRED RECORDS (OUTPATIENT)
Dept: HEALTH INFORMATION MANAGEMENT | Facility: CLINIC | Age: 73
End: 2023-10-18
Payer: MEDICARE

## 2023-10-18 DIAGNOSIS — G62.9 NEUROPATHY: Primary | ICD-10-CM

## 2023-10-19 ASSESSMENT — ENCOUNTER SYMPTOMS
DIZZINESS: 0
DYSURIA: 0
FREQUENCY: 0
CHILLS: 0
JOINT SWELLING: 1
PARESTHESIAS: 0
COUGH: 0
HEMATURIA: 0
ARTHRALGIAS: 0
CONSTIPATION: 0
MYALGIAS: 0
SHORTNESS OF BREATH: 0
FEVER: 0
NERVOUS/ANXIOUS: 0
HEMATOCHEZIA: 0
SORE THROAT: 0
NAUSEA: 0
EYE PAIN: 0
ABDOMINAL PAIN: 0
HEADACHES: 0
PALPITATIONS: 0
BREAST MASS: 0
WEAKNESS: 0
DIARRHEA: 1
HEARTBURN: 0

## 2023-10-19 ASSESSMENT — ACTIVITIES OF DAILY LIVING (ADL): CURRENT_FUNCTION: NO ASSISTANCE NEEDED

## 2023-10-19 NOTE — COMMUNITY RESOURCES LIST (ENGLISH)
10/19/2023   [facility] [employer]  N/A  For questions about this resource list or additional care needs, please contact your primary care clinic or care manager.  Phone: [phone]   Email: N/A   Address: [address] [place] [id number]   Hours: N/A        Financial Stability       Utility payment assistance  1  [facility]  Office [facility] Distance: 9.05 miles      In-Person, Phone/Virtual   [address] [place] [address]  Language: English  Hours: Mon - Fri 8:00 AM - 12:00 PM , Mon - Fri 1:00 PM - 4:00 PM  Fees: Free   Phone: (714) 720-3961 Email: [email or web address] Website: [email or web address]-services-[email or web address]     2  [facility] Distance: 9.21 miles      [address] [place] [address]  Language: English, Lithuanian  Hours: Mon 9:30 AM - 11:30 AM , Tue 1:30 PM - 7:30 PM , Thu 9:00 AM - 7:00 PM , Fri 9:30 AM - 11:30 AM   Phone: (420) 992-4380 Email: info@[email or web address] Website: http://[facility] Hospital[facility] it![place].org/          Important Numbers & Websites       Emergency Services   911  [facility]   311  Poison Control   (531) 460-1662  Suicide Prevention Lifeline   (146) 818-8482 (TALK)  Child Abuse Hotline   (959) 581-3941 (4-A-Child)  Sexual Assault Hotline   (637) 950-5188 (HOPE)  National Runaway Safeline   (937) 369-5245 (RUNAWAY)  All-Options Talkline   (706) 671-1887  Substance Abuse Referral   (787) 164-5527 (HELP)

## 2023-10-26 ENCOUNTER — OFFICE VISIT (OUTPATIENT)
Dept: INTERNAL MEDICINE | Facility: CLINIC | Age: 73
End: 2023-10-26
Payer: MEDICARE

## 2023-10-26 VITALS
TEMPERATURE: 97.4 F | OXYGEN SATURATION: 99 % | WEIGHT: 103 LBS | SYSTOLIC BLOOD PRESSURE: 120 MMHG | HEART RATE: 65 BPM | DIASTOLIC BLOOD PRESSURE: 72 MMHG | RESPIRATION RATE: 19 BRPM | HEIGHT: 63 IN | BODY MASS INDEX: 18.25 KG/M2

## 2023-10-26 DIAGNOSIS — E55.9 VITAMIN D INSUFFICIENCY: ICD-10-CM

## 2023-10-26 DIAGNOSIS — Z17.0 MALIGNANT NEOPLASM OF LOWER-OUTER QUADRANT OF RIGHT BREAST OF FEMALE, ESTROGEN RECEPTOR POSITIVE (H): ICD-10-CM

## 2023-10-26 DIAGNOSIS — I25.10 ASHD (ARTERIOSCLEROTIC HEART DISEASE): ICD-10-CM

## 2023-10-26 DIAGNOSIS — I10 ESSENTIAL HYPERTENSION: Primary | ICD-10-CM

## 2023-10-26 DIAGNOSIS — G62.9 NEUROPATHY: ICD-10-CM

## 2023-10-26 DIAGNOSIS — E78.2 MIXED HYPERLIPIDEMIA: Chronic | ICD-10-CM

## 2023-10-26 DIAGNOSIS — H61.21 IMPACTED CERUMEN OF RIGHT EAR: ICD-10-CM

## 2023-10-26 DIAGNOSIS — Z95.5 S/P CORONARY ARTERY STENT PLACEMENT: Chronic | ICD-10-CM

## 2023-10-26 DIAGNOSIS — Z00.00 ENCOUNTER FOR MEDICARE ANNUAL WELLNESS EXAM: ICD-10-CM

## 2023-10-26 DIAGNOSIS — E10.9 CONTROLLED TYPE 1 DIABETES MELLITUS WITHOUT COMPLICATION (H): ICD-10-CM

## 2023-10-26 DIAGNOSIS — C50.511 MALIGNANT NEOPLASM OF LOWER-OUTER QUADRANT OF RIGHT BREAST OF FEMALE, ESTROGEN RECEPTOR POSITIVE (H): ICD-10-CM

## 2023-10-26 DIAGNOSIS — Z29.11 NEED FOR VACCINATION AGAINST RESPIRATORY SYNCYTIAL VIRUS: ICD-10-CM

## 2023-10-26 LAB
ALBUMIN SERPL BCG-MCNC: 4.6 G/DL (ref 3.5–5.2)
ALBUMIN UR-MCNC: NEGATIVE MG/DL
ALP SERPL-CCNC: 63 U/L (ref 35–104)
ALT SERPL W P-5'-P-CCNC: 26 U/L (ref 0–50)
ANION GAP SERPL CALCULATED.3IONS-SCNC: 11 MMOL/L (ref 7–15)
APPEARANCE UR: CLEAR
AST SERPL W P-5'-P-CCNC: 36 U/L (ref 0–45)
BILIRUB SERPL-MCNC: 0.8 MG/DL
BILIRUB UR QL STRIP: NEGATIVE
BUN SERPL-MCNC: 12.3 MG/DL (ref 8–23)
CALCIUM SERPL-MCNC: 9.5 MG/DL (ref 8.8–10.2)
CHLORIDE SERPL-SCNC: 100 MMOL/L (ref 98–107)
CHOLEST SERPL-MCNC: 159 MG/DL
COLOR UR AUTO: YELLOW
CREAT SERPL-MCNC: 0.63 MG/DL (ref 0.51–0.95)
CREAT UR-MCNC: 8.9 MG/DL
DEPRECATED HCO3 PLAS-SCNC: 29 MMOL/L (ref 22–29)
EGFRCR SERPLBLD CKD-EPI 2021: >90 ML/MIN/1.73M2
ERYTHROCYTE [DISTWIDTH] IN BLOOD BY AUTOMATED COUNT: 13.2 % (ref 10–15)
GLUCOSE SERPL-MCNC: 131 MG/DL (ref 70–99)
GLUCOSE UR STRIP-MCNC: NEGATIVE MG/DL
HBA1C MFR BLD: 5.9 % (ref 0–5.6)
HCT VFR BLD AUTO: 44.1 % (ref 35–47)
HDLC SERPL-MCNC: 92 MG/DL
HGB BLD-MCNC: 14.6 G/DL (ref 11.7–15.7)
HGB UR QL STRIP: NEGATIVE
KETONES UR STRIP-MCNC: NEGATIVE MG/DL
LDLC SERPL CALC-MCNC: 60 MG/DL
LEUKOCYTE ESTERASE UR QL STRIP: NEGATIVE
MCH RBC QN AUTO: 30.9 PG (ref 26.5–33)
MCHC RBC AUTO-ENTMCNC: 33.1 G/DL (ref 31.5–36.5)
MCV RBC AUTO: 93 FL (ref 78–100)
MICROALBUMIN UR-MCNC: <12 MG/L
MICROALBUMIN/CREAT UR: NORMAL MG/G{CREAT}
NITRATE UR QL: NEGATIVE
NONHDLC SERPL-MCNC: 67 MG/DL
PH UR STRIP: 7 [PH] (ref 5–8)
PLATELET # BLD AUTO: 263 10E3/UL (ref 150–450)
POTASSIUM SERPL-SCNC: 4.2 MMOL/L (ref 3.4–5.3)
PROT SERPL-MCNC: 7.4 G/DL (ref 6.4–8.3)
RBC # BLD AUTO: 4.72 10E6/UL (ref 3.8–5.2)
SODIUM SERPL-SCNC: 140 MMOL/L (ref 135–145)
SP GR UR STRIP: 1.01 (ref 1–1.03)
TRIGL SERPL-MCNC: 33 MG/DL
TSH SERPL DL<=0.005 MIU/L-ACNC: 3 UIU/ML (ref 0.3–4.2)
UROBILINOGEN UR STRIP-ACNC: 0.2 E.U./DL
VIT D+METAB SERPL-MCNC: 48 NG/ML (ref 20–50)
WBC # BLD AUTO: 6.6 10E3/UL (ref 4–11)

## 2023-10-26 PROCEDURE — 91320 SARSCV2 VAC 30MCG TRS-SUC IM: CPT | Performed by: INTERNAL MEDICINE

## 2023-10-26 PROCEDURE — 85027 COMPLETE CBC AUTOMATED: CPT | Performed by: INTERNAL MEDICINE

## 2023-10-26 PROCEDURE — 90480 ADMN SARSCOV2 VAC 1/ONLY CMP: CPT | Performed by: INTERNAL MEDICINE

## 2023-10-26 PROCEDURE — 36415 COLL VENOUS BLD VENIPUNCTURE: CPT | Performed by: INTERNAL MEDICINE

## 2023-10-26 PROCEDURE — 82306 VITAMIN D 25 HYDROXY: CPT | Performed by: INTERNAL MEDICINE

## 2023-10-26 PROCEDURE — 83036 HEMOGLOBIN GLYCOSYLATED A1C: CPT | Performed by: INTERNAL MEDICINE

## 2023-10-26 PROCEDURE — 80061 LIPID PANEL: CPT | Performed by: INTERNAL MEDICINE

## 2023-10-26 PROCEDURE — 80053 COMPREHEN METABOLIC PANEL: CPT | Performed by: INTERNAL MEDICINE

## 2023-10-26 PROCEDURE — 81003 URINALYSIS AUTO W/O SCOPE: CPT | Performed by: INTERNAL MEDICINE

## 2023-10-26 PROCEDURE — 84443 ASSAY THYROID STIM HORMONE: CPT | Performed by: INTERNAL MEDICINE

## 2023-10-26 PROCEDURE — 90662 IIV NO PRSV INCREASED AG IM: CPT | Performed by: INTERNAL MEDICINE

## 2023-10-26 PROCEDURE — 99214 OFFICE O/P EST MOD 30 MIN: CPT | Mod: 25 | Performed by: INTERNAL MEDICINE

## 2023-10-26 PROCEDURE — G0439 PPPS, SUBSEQ VISIT: HCPCS | Performed by: INTERNAL MEDICINE

## 2023-10-26 PROCEDURE — G0008 ADMIN INFLUENZA VIRUS VAC: HCPCS | Performed by: INTERNAL MEDICINE

## 2023-10-26 PROCEDURE — 82043 UR ALBUMIN QUANTITATIVE: CPT | Performed by: INTERNAL MEDICINE

## 2023-10-26 PROCEDURE — 82570 ASSAY OF URINE CREATININE: CPT | Performed by: INTERNAL MEDICINE

## 2023-10-26 RX ORDER — ATORVASTATIN CALCIUM 40 MG/1
40 TABLET, FILM COATED ORAL AT BEDTIME
Qty: 90 TABLET | Refills: 3 | Status: SHIPPED | OUTPATIENT
Start: 2023-10-26

## 2023-10-26 RX ORDER — RESPIRATORY SYNCYTIAL VIRUS VACCINE 120MCG/0.5
0.5 KIT INTRAMUSCULAR ONCE
Qty: 1 EACH | Refills: 0 | Status: SHIPPED | OUTPATIENT
Start: 2023-10-26 | End: 2023-10-26

## 2023-10-26 RX ORDER — IRBESARTAN AND HYDROCHLOROTHIAZIDE 150; 12.5 MG/1; MG/1
1 TABLET, FILM COATED ORAL DAILY
Qty: 90 TABLET | Refills: 3 | Status: SHIPPED | OUTPATIENT
Start: 2023-10-26

## 2023-10-26 ASSESSMENT — ENCOUNTER SYMPTOMS
NEUROLOGIC COMPLAINT: 1
NAUSEA: 0
HEARTBURN: 0
DIZZINESS: 0
BREAST MASS: 0
PALPITATIONS: 0
FREQUENCY: 0
HEADACHES: 0
EYE PAIN: 0
HEMATOCHEZIA: 0
NERVOUS/ANXIOUS: 0
CONSTIPATION: 0
CHILLS: 0
JOINT SWELLING: 1
SHORTNESS OF BREATH: 0
DYSURIA: 0
SORE THROAT: 0
HEMATURIA: 0
PARESTHESIAS: 0
DIARRHEA: 1
ABDOMINAL PAIN: 0
MYALGIAS: 0
ARTHRALGIAS: 0
COUGH: 0
FEVER: 0
WEAKNESS: 0

## 2023-10-26 ASSESSMENT — ACTIVITIES OF DAILY LIVING (ADL): CURRENT_FUNCTION: NO ASSISTANCE NEEDED

## 2023-10-26 NOTE — PROGRESS NOTES
"SUBJECTIVE:   Sammi is a 72 year old who presents for Preventive Visit.        10/26/2023     8:57 AM   Additional Questions   Roomed by Melissa MARION Cma       Are you in the first 12 months of your Medicare coverage?  No    Healthy Habits:     In general, how would you rate your overall health?  Excellent    Frequency of exercise:  6-7 days/week    Duration of exercise:  Greater than 60 minutes    Do you usually eat at least 4 servings of fruit and vegetables a day, include whole grains    & fiber and avoid regularly eating high fat or \"junk\" foods?  Yes    Taking medications regularly:  Yes    Medication side effects:  None    Ability to successfully perform activities of daily living:  No assistance needed    Home Safety:  No safety concerns identified    Hearing Impairment:  No hearing concerns    In the past 6 months, have you been bothered by leaking of urine?  No    In general, how would you rate your overall mental or emotional health?  Excellent    Additional concerns today:  Yes      Today's PHQ-2 Score:       10/26/2023     8:49 AM   PHQ-2 ( 1999 Pf[employer])   Q1: Little interest or pleasure in doing things 0   Q2: Feeling down, depressed or hopeless 0   PHQ-2 Score 0   Q1: Little interest or pleasure in doing things Not at all   Q2: Feeling down, depressed or hopeless Not at all   PHQ-2 Score 0           Have you ever done Advance Care Planning? (For example, a Health Directive, POLST, or a discussion with a medical provider or your loved ones about your wishes): No, advance care planning information given to patient to review.  Patient plans to discuss their wishes with loved ones or provider.         Fall risk  Fallen 2 or more times in the past year?: Yes  Any fall with injury in the past year?: No    Cognitive Screening   1) Repeat 3 items (Leader, Season, Table)    2) Clock draw: NORMAL  3) 3 item recall: Recalls 3 objects  Results:  5/5     Mini-CogTM Copyright S Margaret. Licensed by the author for use in " [facility]; reprinted with permission (pauldavy@[email or web address]). All rights reserved.      Do you have sleep apnea, excessive snoring or daytime drowsiness? : no    Reviewed and updated as needed this visit by clinical staff   Tobacco  Allergies  Meds              Reviewed and updated as needed this visit by Provider                 Social History     Tobacco Use    Smoking status: Former     Packs/day: .5     Types: Cigarettes     Quit date: 1985     Years since quittin.1    Smokeless tobacco: Never   Substance Use Topics    Alcohol use: Yes     Alcohol/week: 5.0 standard drinks of alcohol             10/19/2023    11:27 AM   Alcohol Use   Prescreen: >3 drinks/day or >7 drinks/week? No          No data to display              Do you have a current opioid prescription? No  Do you use any other controlled substances or medications that are not prescribed by a provider? None                Current providers sharing in care for this patient include:   Patient Care Team:  Jeana Al MD as PCP - General (Internal Medicine)  Gagan Sanchez MD as MD (Hematology & Oncology)  Jeana Al MD as Assigned PCP  Gayatri More APRN CNP as Nurse Practitioner (Colon & Rectal)  Jamal Garcia MD as Assigned Neuroscience Provider  Rolf Gipson MD as Assigned Heart and Vascular Provider  Yunior Welsh DPM as Assigned Surgical Provider    The following health maintenance items are reviewed in Epic and correct as of today:  Health Maintenance   Topic Date Due    DIABETIC FOOT EXAM  Never done    HEPATITIS B IMMUNIZATION (1 of 3 - Risk 3-dose series) Never done    RSV VACCINE 60+ (1 - 1-dose 60+ series) Never done    MICROALBUMIN  10/19/2021    A1C  2022    INFLUENZA VACCINE (1) 2023    COVID-19 Vaccine ( season) 2023    BMP  10/24/2023    LIPID  10/24/2023    MEDICARE ANNUAL WELLNESS VISIT  10/24/2023    ANNUAL REVIEW OF HM ORDERS  2024     EYE EXAM  08/23/2024    FALL RISK ASSESSMENT  10/26/2024    COLORECTAL CANCER SCREENING  04/20/2025    ADVANCE CARE PLANNING  10/24/2027    DTAP/TDAP/TD IMMUNIZATION (5 - Td or Tdap) 01/02/2031    DEXA  10/17/2038    HEPATITIS C SCREENING  Completed    PHQ-2 (once per calendar year)  Completed    Pneumococcal Vaccine: 65+ Years  Completed    ZOSTER IMMUNIZATION  Completed    IPV IMMUNIZATION  Aged Out    HPV IMMUNIZATION  Aged Out    MENINGITIS IMMUNIZATION  Aged Out    MAMMO SCREENING  Discontinued       Current Outpatient Medications   Medication    aspirin 81 MG tablet    atorvastatin (LIPITOR) 40 MG tablet    b complex vitamins tablet    blood glucose test strips    blood-glucose sensor (DEXCOM G6 SENSOR) Lida    calcium carbonate (OS-PATTI) 600 mg (1,500 mg) tablet    cholecalciferol, vitamin D3, (VITAMIN D3) 2,000 unit cap    Continuous Blood Gluc Transmit (DEXCOM G6 TRANSMITTER) MISC    insulin lispro (HUMALOG) 100 unit/mL injection    irbesartan-hydrochlorothiazide (AVALIDE) 150-12.5 MG tablet    lysine 500 mg Tab    methylcellulose (CITRUCEL) powder    multivitamin therapeutic tablet    respiratory syncytial virus vaccine, bivalent (ABRYSVO) injection     No current facility-administered medications for this visit.             Review of Systems   Constitutional:  Negative for chills and fever.   HENT:  Negative for congestion, ear pain, hearing loss and sore throat.    Eyes:  Negative for pain and visual disturbance.   Respiratory:  Negative for cough and shortness of breath.    Cardiovascular:  Negative for chest pain, palpitations and peripheral edema.   Gastrointestinal:  Positive for diarrhea. Negative for abdominal pain, constipation, heartburn, hematochezia and nausea.   Breasts:  Negative for tenderness, breast mass and discharge.   Genitourinary:  Negative for dysuria, frequency, genital sores, hematuria, pelvic pain, urgency, vaginal bleeding and vaginal discharge.   Musculoskeletal:  Positive for  "joint swelling. Negative for arthralgias and myalgias.   Skin:  Negative for rash.   Neurological:  Negative for dizziness, weakness, headaches and paresthesias.   Psychiatric/Behavioral:  Negative for mood changes. The patient is not nervous/anxious.          OBJECTIVE:   /72 (BP Location: Left arm, Patient Position: Sitting, Cuff Size: Adult Regular)   Pulse 65   Temp 97.4  F (36.3  C) (Tympanic)   Resp 19   Ht 1.598 m (5' 2.9\")   Wt 46.7 kg (103 lb)   LMP  (LMP Unknown)   SpO2 99%   Breastfeeding No   BMI 18.30 kg/m   Estimated body mass index is 18.3 kg/m  as calculated from the following:    Height as of this encounter: 1.598 m (5' 2.9\").    Weight as of this encounter: 46.7 kg (103 lb).  Physical Exam  GENERAL: healthy, alert and no distress  EYES: Eyes grossly normal to inspection, PERRL and conjunctivae and sclerae normal  HENT: ear canals and TM's normal, nose and mouth without ulcers or lesions  NECK: no adenopathy, no asymmetry, masses, or scars and thyroid normal to palpation  RESP: lungs clear to auscultation - no rales, rhonchi or wheezes  BREAST: Bilateral mastectomy scars well-healed with no induration or masses   CV: regular rate and rhythm, normal S1 S2, no S3 or S4, no murmur, click or rub, no peripheral edema and peripheral pulses strong  ABDOMEN: soft, nontender, no hepatosplenomegaly, no masses and bowel sounds normal  MS: no gross musculoskeletal defects noted, no edema  SKIN: no suspicious lesions or rashes  NEURO: Normal strength and tone, mentation intact and speech normal  PSYCH: mentation appears normal, affect normal/bright  Romberg's negative.  Heel lift normal  FEET : Inspection is normal pulses are palpable monofilament testing is normal there is no edema        ASSESSMENT / PLAN:   (Z13.9) Encounter for screening involving social determinants of health (SDoH)  (primary encounter diagnosis)  Comment:   Plan:     (I10) Essential hypertension  Comment:   Plan: Excellent " control    (I25.10) ASHD (arteriosclerotic heart disease)  Comment:   Plan: High-dose statin    (Z23) Need for prophylactic vaccination against hepatitis B virus  Comment:   Plan:     (Z29.11) Need for vaccination against respiratory syncytial virus  Comment: She is up-to-date in all her health immunizations but can get RSV at a later date  Plan:     (E10.9) Controlled type 1 diabetes mellitus without complication (H)  Comment:   Plan: Beatties management is deferred to her endocrinologist she is on insulin pump.  She has no endorgan damage.  Kidney disease is perfect  (Z00.00) Encounter for Medicare annual wellness exam  Comment:   Plan:     (Z95.5) S/P coronary artery stent placement  Comment:   Plan: He is very active is a major hiker no injury noted diminished exercise tolerance    (E78.2) Mixed hyperlipidemia  Comment:   Plan:     (C50.511,  Z17.0) Malignant neoplasm of lower-outer quadrant of right breast of female, estrogen receptor positive (H)  Comment:   Plan: She is in remission    (G56.00) Carpal tunnel syndrome, unspecified laterality  Comment:   Plan:     (M54.12) Cervical radiculopathy  Comment:   Plan:     (G62.9) Neuropathy  Comment: She has a feeling of fullness in her feet no pain and she feels her balance is off a little bit but she considers that when she cannot stand on her foot for more than 30 seconds without swelling.  She continues to hike at a brisk pace.  She does use hiking poles has had 2 falls but both her trips that could have happened to anybody  Does feel her balance is a little worse , standing in one leg .   Has hAD TWO FALLS , BACKPACKING IN [place] , TRIPPED OVER A BIG ROCK   September sitting in a folding chair and fell over   She had an EMG done in December which showed no improved for neuropathy I did tell her that the large fibers.  Here like sensory neuropathy can still exist with a normal EMG.  No treatment with gabapentin is necessary given her lack of pain.  There was a  question of an S1 radiculopathy but currently she has no low back pain.  She has seen another neurologist for a second I did review the notes and some metabolic work-up was ordered.  (E55.9) Vitamin D insufficiency  Comment:   Plan:           COUNSELING:  Reviewed preventive health counseling, as reflected in patient instructions  Colonoscopy is due in 2025.  20 can be given 5 years after her last 1  Lab Results   Component Value Date    A1C 5.9 10/19/2020    A1C 6.0 09/24/2018    A1C 6.0 10/10/2016    A1C 5.7 10/07/2015         She reports that she quit smoking about 38 years ago. Her smoking use included cigarettes. She smoked an average of .5 packs per day. She has never used smokeless tobacco.      Appropriate preventive services were discussed with this patient, including applicable screening as appropriate for fall prevention, nutrition, physical activity, Tobacco-use cessation, weight loss and cognition.  Checklist reviewing preventive services available has been given to the patient.    Reviewed patients plan of care and provided an AVS. The Basic Care Plan (routine screening as documented in Health Maintenance) for Sammi meets the Care Plan requirement. This Care Plan has been established and reviewed with the Patient.        Jeana Al MD  [facility]    Identified Health Risks:  I have reviewed Opioid Use Disorder and Substance Use Disorder risk factors and made any needed referrals.

## 2023-10-26 NOTE — PATIENT INSTRUCTIONS
Colonoscopy not due till 2015   Good idea to get the RSV vaccine       Patient Education   Personalized Prevention Plan  You are due for the preventive services outlined below.  Your care team is available to assist you in scheduling these services.  If you have already completed any of these items, please share that information with your care team to update in your medical record.  Health Maintenance Due   Topic Date Due    Diabetic Foot Exam  Never done    Hepatitis B Vaccine (1 of 3 - Risk 3-dose series) Never done    RSV VACCINE 60+ (1 - 1-dose 60+ series) Never done    Kidney Microalbumin Urine Test  10/19/2021    A1C Lab  05/18/2022    Flu Vaccine (1) 09/01/2023    COVID-19 Vaccine (6 - 2023-24 season) 09/01/2023    Basic Metabolic Panel  10/24/2023    Cholesterol Lab  10/24/2023    Annual Wellness Visit  10/24/2023

## 2023-10-26 NOTE — COMMUNITY RESOURCES LIST (ENGLISH)
10/26/2023   [facility] [employer]  N/A  For questions about this resource list or additional care needs, please contact your primary care clinic or care manager.  Phone: [phone]   Email: N/A   Address: [address] [place] [id number]   Hours: N/A        Financial Stability       Utility payment assistance  1  [facility]  Office [facility] Distance: 9.05 miles      In-Person, Phone/Virtual   [address] [place] [address]  Language: English  Hours: Mon - Fri 8:00 AM - 12:00 PM , Mon - Fri 1:00 PM - 4:00 PM  Fees: Free   Phone: (357) 855-4644 Email: [email or web address] Website: https://[facility]/social-services-office-[place]/     2  [facility] Distance: 9.21 miles      [address] [place] [address]  Language: English, Bulgarian  Hours: Mon 9:30 AM - 11:30 AM , Tue 1:30 PM - 7:30 PM , Thu 9:00 AM - 7:00 PM , Fri 9:30 AM - 11:30 AM   Phone: (894) 674-2451 Email: info@[employer] Website: http://[employer]![facility] HospitalEtubicsmn.org/          Important Numbers & Websites       Emergency Services   911  [facility]   311  Poison Control   (440) 990-3820  Suicide Prevention Lifeline   (830) 774-7762 (TALK)  Child Abuse Hotline   (394) 444-7104 (4-A-Child)  Sexual Assault Hotline   (120) 124-3029 (HOPE)  National Runaway Safeline   (498) 229-2080 (RUNAWAY)  All-Options Talkline   (477) 502-2091  Substance Abuse Referral   (438) 165-4505 (HELP)

## 2023-10-26 NOTE — COMMUNITY RESOURCES LIST (ENGLISH)
10/26/2023   [facility] [employer]  N/A  For questions about this resource list or additional care needs, please contact your primary care clinic or care manager.  Phone: [phone]   Email: N/A   Address: [address] [place] [address]   Hours: N/A        Financial Stability       Utility payment assistance  1  The [facility]  Office [facility] Distance: 9.05 miles      In-Person, Phone/Virtual   [address] [place] [address]  Language: English  Hours: Mon - Fri 8:00 AM - 12:00 PM , Mon - Fri 1:00 PM - 4:00 PM  Fees: Free   Phone: (394) 407-6152 Email: se@[email or web address] Website: https://[facility]/social-services-office-[place]/     2  [place] [facility] Distance: 9.21 miles      [address] [place] [address]  Language: English, Korean  Hours: Mon 9:30 AM - 11:30 AM , Tue 1:30 PM - 7:30 PM , Thu 9:00 AM - 7:00 PM , Fri 9:30 AM - 11:30 AM   Phone: (483) 541-5007 Email: info@[employer] Website: http://[facility]GS[facility]–Barry RoadEtransmedia Technologymn.org/          Important Numbers & Websites       Emergency Services   911  [facility]   311  Poison Control   (233) 626-3502  Suicide Prevention Lifeline   (468) 859-6654 (TALK)  Child Abuse Hotline   (932) 961-8506 (4-A-Child)  Sexual Assault Hotline   (410) 924-4285 (HOPE)  National Runaway Safeline   (651) 468-8119 (RUNAWAY)  All-Options Talkline   (527) 575-8324  Substance Abuse Referral   (196) 905-3507 (HELP)

## 2023-11-27 ENCOUNTER — TRANSFERRED RECORDS (OUTPATIENT)
Dept: HEALTH INFORMATION MANAGEMENT | Facility: CLINIC | Age: 73
End: 2023-11-27
Payer: MEDICARE

## 2023-12-11 ENCOUNTER — TRANSFERRED RECORDS (OUTPATIENT)
Dept: HEALTH INFORMATION MANAGEMENT | Facility: CLINIC | Age: 73
End: 2023-12-11
Payer: MEDICARE

## 2024-04-17 ENCOUNTER — OFFICE VISIT (OUTPATIENT)
Dept: FAMILY MEDICINE | Facility: CLINIC | Age: 74
End: 2024-04-17
Payer: MEDICARE

## 2024-04-17 VITALS
SYSTOLIC BLOOD PRESSURE: 112 MMHG | HEART RATE: 67 BPM | OXYGEN SATURATION: 98 % | HEIGHT: 63 IN | BODY MASS INDEX: 17.88 KG/M2 | RESPIRATION RATE: 16 BRPM | DIASTOLIC BLOOD PRESSURE: 70 MMHG | TEMPERATURE: 97.6 F | WEIGHT: 100.9 LBS

## 2024-04-17 DIAGNOSIS — L72.9 CUTANEOUS CYST: Primary | ICD-10-CM

## 2024-04-17 PROCEDURE — 99212 OFFICE O/P EST SF 10 MIN: CPT | Performed by: NURSE PRACTITIONER

## 2024-04-17 ASSESSMENT — PAIN SCALES - GENERAL: PAINLEVEL: NO PAIN (0)

## 2024-04-17 NOTE — PROGRESS NOTES
"    Carlos Chapa is a 73 year old, presenting for the following health issues:  Derm Problem (Lump in Left Armpit found it on Monday when applying deodorant.)      4/17/2024     2:40 PM   Additional Questions   Roomed by  LPN     History of Present Illness       Reason for visit:  Bump in my left armpit  Symptom onset:  1-3 days ago  Symptoms include:  Bump in my left arm pit  Symptom intensity:  Mild  Symptom progression:  Worsening  Had these symptoms before:  No  What makes it worse:  ??  What makes it better:  ??    She eats 2-3 servings of fruits and vegetables daily.She consumes 0 sweetened beverage(s) daily.She exercises with enough effort to increase her heart rate 60 or more minutes per day.  She exercises with enough effort to increase her heart rate 7 days per week.   She is taking medications regularly.     Here for lump in left arm pit x 4 days.  Afebrile.       Review of Systems  Constitutional, HEENT, cardiovascular, pulmonary, gi and gu systems are negative, except as otherwise noted.      Objective    /70 (BP Location: Left arm, Patient Position: Sitting, Cuff Size: Adult Regular)   Pulse 67   Temp 97.6  F (36.4  C) (Oral)   Resp 16   Ht 1.588 m (5' 2.5\")   Wt 45.8 kg (100 lb 14.4 oz)   LMP  (LMP Unknown)   SpO2 98%   Breastfeeding No   BMI 18.16 kg/m    Body mass index is 18.16 kg/m .  Physical Exam   GENERAL: alert and no distress  RESP: respirations even, non-labored   MS: no gross musculoskeletal defects noted, no edema  SKIN: no suspicious lesions or rashes.  Left axilla- small cyst- has not yet come to a head  PSYCH: mentation appears normal, affect normal/bright    A/P:  1. Cutaneous cyst  Warm compresses.  RTC for I & D if needed          Signed Electronically by: Reyna Smith CNP    "

## 2024-05-22 ENCOUNTER — OFFICE VISIT (OUTPATIENT)
Dept: SURGERY | Facility: CLINIC | Age: 74
End: 2024-05-22
Payer: MEDICARE

## 2024-05-22 VITALS
DIASTOLIC BLOOD PRESSURE: 79 MMHG | OXYGEN SATURATION: 100 % | HEART RATE: 72 BPM | BODY MASS INDEX: 18.32 KG/M2 | SYSTOLIC BLOOD PRESSURE: 138 MMHG | WEIGHT: 103.4 LBS | HEIGHT: 63 IN

## 2024-05-22 DIAGNOSIS — R15.9 FULL INCONTINENCE OF FECES: Primary | ICD-10-CM

## 2024-05-22 PROCEDURE — 99214 OFFICE O/P EST MOD 30 MIN: CPT | Performed by: NURSE PRACTITIONER

## 2024-05-22 ASSESSMENT — PAIN SCALES - GENERAL: PAINLEVEL: NO PAIN (0)

## 2024-05-22 NOTE — LETTER
"2024       RE: Sammi Guadalupe  [address] [place] [address]       Dear Colleague,    Thank you for referring your patient, Sammi Guadalupe, to the [facility] [place] at [facility]. Please see a copy of my visit note below.    Colon and Rectal Surgery Consult Clinic Note     Referring provider:  Jeana Al MD  [address] [place] [address]     RE: Sammi Guadalupe  : 1950  ALEJO: 2024    Sammi Guadalupe is a very pleasant 73 year old female with a significant past medical history of breast cancer s/p bilateral mastectomy, systemic chemotherapy, and tamoxifen and aromatase inhibitor use, total hysterectomy, and T1DM who presents for follow up of fecal incontinence     I saw Sammi in  with fecal incontinence.  She takes a fiber supplement but has some continued fecal incontinence.  She feels like if she takes more fiber that she gets constipated.  She will have a normal bowel movement in the morning but then seep a small amount of mushy stool throughout the day.  She feels like this can also be very dependent on what she eats.  She had one pregnancy that ended in an .  Colonoscopy in  was normal.    Physical Examination:  /79 (BP Location: Left arm, Patient Position: Sitting, Cuff Size: Adult Regular)   Pulse 72   Ht 5' 2.5\"   Wt 103 lb 6.4 oz   LMP  (LMP Unknown)   SpO2 100%   BMI 18.61 kg/m    General: alert, well-appearing, not in any distress    Assessment/Plan: Sammi Guadalupe is a very pleasant 71 year old female with a significant past medical history of breast cancer with fecal incontinence.  We had discussed bulking of her stools in the past that she only loses more she stool after formed bowel movements.  We had a long discussion regarding the nature of fecal incontinence again today and I again recommended increasing the fiber supplement " to 2 or even 3 times a day if she is only taking this daily to every other day to see if this improves symptoms.  Due for colonoscopy next year.  Will have her follow-up with fecal incontinence does not resolve.    Medical history:  Past Medical History:   Diagnosis Date    ASHD (arteriosclerotic heart disease) 2012    CT scan, calcium score 428 lad artery deployment of a non-drug-eluting stent in the LAD    DVT (deep venous thrombosis) (H)     Family history of myocardial infarction        Surgical history:  Past Surgical History:   Procedure Laterality Date    BACK SURGERY      IR MISCELLANEOUS PROCEDURE  1/27/2014    IR PORT REMOVAL  2/6/2015    LAMINECTOMY AND MICRODISCECTOMY CERVICAL SPINE N/A     LAPAROSCOPIC HYSTERECTOMY N/A 12/12/2016    Procedure: ROBOTIC TOTAL LAPAROSCOPIC HYSTERECTOMY, BILATERAL SALPINGO-OOPHORECTOMY, CYSTOSCOPY;  Surgeon: Sohan Ramirez MD;  Location: [facility];  Service:     MASTECTOMY Bilateral     RELEASE CARPAL TUNNEL Bilateral        Problem list:    Patient Active Problem List    Diagnosis Date Noted    Essential hypertension 06/08/2023     Priority: Medium    Neuropathy 10/24/2022     Priority: Medium    Vitamin D insufficiency 10/24/2022     Priority: Medium    History of DVT (deep vein thrombosis) 06/05/2022     Priority: Medium    Osteopenia of multiple sites 04/30/2021     Priority: Medium     Formatting of this note might be different from the original.  On aromatase inhibitors -   2016-Prolia was started      Insulin pump in place 03/27/2019     Priority: Medium     .Basal rates: units/hr  MN  0.4  (change to .35)  6am 0.2    11am 0.1   6pm 0.225  8pm to [place] 0.375  Bolus:  Insulin to Carb ratio  at Midnite  1 unit(s) per 15 grams CHO  (change to   22)                                     at 11am      1  unit(s) per 15 grams CHO      (change to 22)                                             at  5pm to MN    1      unit(s) per 12 grams   CHO   (change  to  22)  Sensitivity  67  glucose target range  100-100  Active Insulin Time  4        Adverse effect of other drugs, medicaments and biological substances, sequela 11/26/2018     Priority: Medium    Diabetes mellitus type I, controlled (H) 09/24/2018     Priority: Medium    S/P coronary artery stent placement 09/24/2018     Priority: Medium     2012 single stent . Dr Gipson . 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  Diagnosis 57 years old      Malignant neoplasm of lower-outer quadrant of female breast (H) 05/20/2014     Priority: Medium     S/p bilateral mastectomy .    then had chemotherapy , is still on tamoxifen going t stop tamoxifen this   year .         Cervical radiculopathy 12/17/2012     Priority: Medium     Formatting of this note might be different from the original.  R C67 HLMD, foramenotomy      Hyperlipidemia 12/17/2012     Priority: Medium    Fatigue, unspecified type 12/13/2012     Priority: Medium    CTS (carpal tunnel syndrome) 01/12/2009     Priority: Medium       Medications:  Current Outpatient Medications   Medication Sig Dispense Refill    aspirin 81 MG tablet [ASPIRIN 81 MG TABLET] Take 81 mg by mouth daily.      atorvastatin (LIPITOR) 40 MG tablet Take 1 tablet (40 mg) by mouth at bedtime 90 tablet 3    b complex vitamins tablet [B COMPLEX VITAMINS TABLET] Take 1 tablet by mouth daily.      blood glucose test  strips [BLOOD GLUCOSE TEST STRIPS] Dispense item covered by pt ins. Test 6 times a day      blood-glucose sensor (DEXCOM G6 SENSOR) Teresa [BLOOD-GLUCOSE SENSOR (DEXCOM G6 SENSOR) TERESA] USE AS DIRECTED FOR CONTINUOUS GLUCOSE MONITORING CHANGE EVERY 10 DAYS      calcium carbonate (OS-PATTI) 600 mg (1,500 mg) tablet [CALCIUM CARBONATE (OS-PATTI) 600 MG (1,500 MG) TABLET] Take 1,200 mg by mouth bedtime. Plus magnesium       cholecalciferol, vitamin D3, (VITAMIN D3) 2,000 unit cap [CHOLECALCIFEROL, VITAMIN D3, (VITAMIN D3) 2,000 UNIT CAP] Take 2,000 Units by mouth bedtime.       Continuous Blood Gluc Transmit (DEXCOM G6 TRANSMITTER) MISC USE AS DIRECTED AND CHANGE EVERY 90 DAYS      insulin lispro (HUMALOG) 100 unit/mL injection [INSULIN LISPRO (HUMALOG) 100 UNIT/ML INJECTION] 20 Units daily.      irbesartan-hydrochlorothiazide (AVALIDE) 150-12.5 MG tablet Take 1 tablet by mouth daily 90 tablet 3    lysine 500 mg Tab [LYSINE 500 MG TAB] Take 500 mg by mouth 2 (two) times a day.      methylcellulose (CITRUCEL) powder       multivitamin therapeutic tablet [MULTIVITAMIN THERAPEUTIC TABLET] Take 1 tablet by mouth daily.         Allergies:  Allergies   Allergen Reactions    Nitroglycerin Other (See Comments)     Blood Pressure drops significantly.    Sulfa Antibiotics Dizziness     Dizziness and doesn't feel normal.       Family history:  Family History   Problem Relation Age of Onset    Cancer Mother     Coronary Artery Disease Mother     Cancer Father     Heart Disease Father     CABG Father     Heart Disease Sister        Social history:  Social History     Tobacco Use    Smoking status: Former     Current packs/day: 0.00     Types: Cigarettes     Quit date: 1985     Years since quittin.7     Passive exposure: Past    Smokeless tobacco: Never   Substance Use Topics    Alcohol use: Yes     Alcohol/week: 5.0 standard drinks of alcohol    Marital status: single.  Occupation: retired.    Nursing Notes:   Yary Champion   "5/22/2024  8:30 AM  Signed  Chief Complaint   Patient presents with    Incontinence       Vitals:    05/22/24 0827   BP: 138/79   BP Location: Left arm   Patient Position: Sitting   Cuff Size: Adult Regular   Pulse: 72   SpO2: 100%   Weight: 103 lb 6.4 oz   Height: 5' 2.5\"       Body mass index is 18.61 kg/m .    Yary Champion CMA       30 minutes spent on the date of the encounter doing chart review, history and exam, documentation and further activities as noted above.       This note was created using speech recognition software and may contain unintended word substitutions.        Again, thank you for allowing me to participate in the care of your patient.      Sincerely,    TASHA West CNP    "

## 2024-05-22 NOTE — NURSING NOTE
"Chief Complaint   Patient presents with    Incontinence       Vitals:    05/22/24 0827   BP: 138/79   BP Location: Left arm   Patient Position: Sitting   Cuff Size: Adult Regular   Pulse: 72   SpO2: 100%   Weight: 103 lb 6.4 oz   Height: 5' 2.5\"       Body mass index is 18.61 kg/m .    Yary Champion CMA    "

## 2024-05-22 NOTE — PROGRESS NOTES
"Colon and Rectal Surgery Consult Clinic Note     Referring provider:  Jeana Al MD  [address] [place] [address]     RE: Sammi Guadalupe  : 1950  ALEJO: 2024    Sammi Guadalupe is a very pleasant 73 year old female with a significant past medical history of breast cancer s/p bilateral mastectomy, systemic chemotherapy, and tamoxifen and aromatase inhibitor use, total hysterectomy, and T1DM who presents for follow up of fecal incontinence     I saw Sammi in  with fecal incontinence.  She takes a fiber supplement but has some continued fecal incontinence.  She feels like if she takes more fiber that she gets constipated.  She will have a normal bowel movement in the morning but then seep a small amount of mushy stool throughout the day.  She feels like this can also be very dependent on what she eats.  She had one pregnancy that ended in an .  Colonoscopy in  was normal.    Physical Examination:  /79 (BP Location: Left arm, Patient Position: Sitting, Cuff Size: Adult Regular)   Pulse 72   Ht 5' 2.5\"   Wt 103 lb 6.4 oz   LMP  (LMP Unknown)   SpO2 100%   BMI 18.61 kg/m    General: alert, well-appearing, not in any distress    Assessment/Plan: Sammi Guadalupe is a very pleasant 71 year old female with a significant past medical history of breast cancer with fecal incontinence.  We had discussed bulking of her stools in the past that she only loses more she stool after formed bowel movements.  We had a long discussion regarding the nature of fecal incontinence again today and I again recommended increasing the fiber supplement to 2 or even 3 times a day if she is only taking this daily to every other day to see if this improves symptoms.  Due for colonoscopy next year.  Will have her follow-up with fecal incontinence does not resolve.    Medical history:  Past Medical History:   Diagnosis Date    ASHD (arteriosclerotic heart disease)     CT scan, calcium " score 428 lad artery deployment of a non-drug-eluting stent in the LAD    DVT (deep venous thrombosis) (H)     Family history of myocardial infarction        Surgical history:  Past Surgical History:   Procedure Laterality Date    BACK SURGERY      IR MISCELLANEOUS PROCEDURE  1/27/2014    IR PORT REMOVAL  2/6/2015    LAMINECTOMY AND MICRODISCECTOMY CERVICAL SPINE N/A     LAPAROSCOPIC HYSTERECTOMY N/A 12/12/2016    Procedure: ROBOTIC TOTAL LAPAROSCOPIC HYSTERECTOMY, BILATERAL SALPINGO-OOPHORECTOMY, CYSTOSCOPY;  Surgeon: Sohan Ramirez MD;  Location: [facility];  Service:     MASTECTOMY Bilateral     RELEASE CARPAL TUNNEL Bilateral        Problem list:    Patient Active Problem List    Diagnosis Date Noted    Essential hypertension 06/08/2023     Priority: Medium    Neuropathy 10/24/2022     Priority: Medium    Vitamin D insufficiency 10/24/2022     Priority: Medium    History of DVT (deep vein thrombosis) 06/05/2022     Priority: Medium    Osteopenia of multiple sites 04/30/2021     Priority: Medium     Formatting of this note might be different from the original.  On aromatase inhibitors -   2016-Prolia was started      Insulin pump in place 03/27/2019     Priority: Medium     .Basal rates: units/hr  MN  0.4  (change to .35)  6am 0.2    11am 0.1   6pm 0.225  8pm to MN 0.375  Bolus:  Insulin to Carb ratio  at Midnite  1 unit(s) per 15 grams CHO  (change to   22)                                     at 11am      1  unit(s) per 15 grams CHO      (change to 22)                                             at  5pm to MN    1      unit(s) per 12 grams   CHO   (change  to 22)  Sensitivity  67  glucose target range  100-100  Active Insulin Time  4        Adverse effect of other drugs, medicaments and biological substances, sequela 11/26/2018     Priority: Medium    Diabetes mellitus type I, controlled (H) 09/24/2018     Priority: Medium    S/P coronary artery stent placement 09/24/2018     Priority: Medium     2012  single stent . Dr Gipson . 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  Diagnosis 57 years old      Malignant neoplasm of lower-outer quadrant of female breast (H) 05/20/2014     Priority: Medium     S/p bilateral mastectomy .    then had chemotherapy , is still on tamoxifen going t stop tamoxifen this   year .         Cervical radiculopathy 12/17/2012     Priority: Medium     Formatting of this note might be different from the original.  R C67 HLMD, foramenotomy      Hyperlipidemia 12/17/2012     Priority: Medium    Fatigue, unspecified type 12/13/2012     Priority: Medium    CTS (carpal tunnel syndrome) 01/12/2009     Priority: Medium       Medications:  Current Outpatient Medications   Medication Sig Dispense Refill    aspirin 81 MG tablet [ASPIRIN 81 MG TABLET] Take 81 mg by mouth daily.      atorvastatin (LIPITOR) 40 MG tablet Take 1 tablet (40 mg) by mouth at bedtime 90 tablet 3    b complex vitamins tablet [B COMPLEX VITAMINS TABLET] Take 1 tablet by mouth daily.      blood glucose test strips [BLOOD GLUCOSE TEST STRIPS] Dispense item covered by pt ins. Test 6 times a day      blood-glucose sensor (DEXCOM G6 SENSOR) Teresa [BLOOD-GLUCOSE SENSOR (DEXCOM G6 SENSOR) TERESA] USE AS DIRECTED FOR CONTINUOUS GLUCOSE MONITORING CHANGE EVERY 10 DAYS      calcium carbonate (OS-PATTI) 600 mg (1,500 mg) tablet [CALCIUM CARBONATE (OS-PATTI) 600 MG  "(1,500 MG) TABLET] Take 1,200 mg by mouth bedtime. Plus magnesium       cholecalciferol, vitamin D3, (VITAMIN D3) 2,000 unit cap [CHOLECALCIFEROL, VITAMIN D3, (VITAMIN D3) 2,000 UNIT CAP] Take 2,000 Units by mouth bedtime.       Continuous Blood Gluc Transmit (DEXCOM G6 TRANSMITTER) MISC USE AS DIRECTED AND CHANGE EVERY 90 DAYS      insulin lispro (HUMALOG) 100 unit/mL injection [INSULIN LISPRO (HUMALOG) 100 UNIT/ML INJECTION] 20 Units daily.      irbesartan-hydrochlorothiazide (AVALIDE) 150-12.5 MG tablet Take 1 tablet by mouth daily 90 tablet 3    lysine 500 mg Tab [LYSINE 500 MG TAB] Take 500 mg by mouth 2 (two) times a day.      methylcellulose (CITRUCEL) powder       multivitamin therapeutic tablet [MULTIVITAMIN THERAPEUTIC TABLET] Take 1 tablet by mouth daily.         Allergies:  Allergies   Allergen Reactions    Nitroglycerin Other (See Comments)     Blood Pressure drops significantly.    Sulfa Antibiotics Dizziness     Dizziness and doesn't feel normal.       Family history:  Family History   Problem Relation Age of Onset    Cancer Mother     Coronary Artery Disease Mother     Cancer Father     Heart Disease Father     CABG Father     Heart Disease Sister        Social history:  Social History     Tobacco Use    Smoking status: Former     Current packs/day: 0.00     Types: Cigarettes     Quit date: 1985     Years since quittin.7     Passive exposure: Past    Smokeless tobacco: Never   Substance Use Topics    Alcohol use: Yes     Alcohol/week: 5.0 standard drinks of alcohol    Marital status: single.  Occupation: retired.    Nursing Notes:   Yary Champion  2024  8:30 AM  Signed  Chief Complaint   Patient presents with    Incontinence       Vitals:    24 0827   BP: 138/79   BP Location: Left arm   Patient Position: Sitting   Cuff Size: Adult Regular   Pulse: 72   SpO2: 100%   Weight: 103 lb 6.4 oz   Height: 5' 2.5\"       Body mass index is 18.61 kg/m .    Yary Champion [facility]       30 " minutes spent on the date of the encounter doing chart review, history and exam, documentation and further activities as noted above.     TASHA Guzman, NP-C  Colon and Rectal Surgery   [facility]    This note was created using speech recognition software and may contain unintended word substitutions.

## 2024-05-23 ENCOUNTER — OFFICE VISIT (OUTPATIENT)
Dept: FAMILY MEDICINE | Facility: CLINIC | Age: 74
End: 2024-05-23
Payer: MEDICARE

## 2024-05-23 ENCOUNTER — NURSE TRIAGE (OUTPATIENT)
Dept: NURSING | Facility: CLINIC | Age: 74
End: 2024-05-23
Payer: MEDICARE

## 2024-05-23 VITALS
BODY MASS INDEX: 18.54 KG/M2 | SYSTOLIC BLOOD PRESSURE: 136 MMHG | WEIGHT: 103 LBS | TEMPERATURE: 99.9 F | DIASTOLIC BLOOD PRESSURE: 74 MMHG | RESPIRATION RATE: 18 BRPM | OXYGEN SATURATION: 96 % | HEART RATE: 77 BPM

## 2024-05-23 DIAGNOSIS — U07.1 INFECTION DUE TO 2019 NOVEL CORONAVIRUS: Primary | ICD-10-CM

## 2024-05-23 PROCEDURE — 99214 OFFICE O/P EST MOD 30 MIN: CPT | Performed by: FAMILY MEDICINE

## 2024-05-23 NOTE — TELEPHONE ENCOUNTER
"  COVID Positive/Requesting COVID treatment    Patient is positive for COVID and requesting treatment options.    Date of positive COVID test (PCR or at home)? 5/23/24  Current COVID symptoms: fever or chills, cough, fatigue, and muscle or body aches   Date COVID symptoms began: 5/22/24    Message should be routed to clinic RN pool. Best phone number to use for call back: n/a (pt will be seen in ).     Pt denies known exposure to Covid. Pt reports her oxygen levels are \"between %\" last night with no worsening of breathing since then per pt.  Last urinated \"thirty minutes ago\". Pt thinks she is getting enough fluids. Pt reports 15 minutes ago oral temperature was 101.7    Reason for Disposition   Fever > 101 F (38.3 C) and over 60 years of age    Additional Information   Negative: SEVERE difficulty breathing (e.g., struggling for each breath, speaks in single words)   Negative: Difficult to awaken or acting confused (e.g., disoriented, slurred speech)   Negative: Bluish (or gray) lips or face now   Negative: Shock suspected (e.g., cold/pale/clammy skin, too weak to stand, low BP, rapid pulse)   Negative: Sounds like a life-threatening emergency to the triager   Negative: Diagnosed or suspected COVID-19 and symptoms lasting 3 or more weeks   Negative: COVID-19 exposure and no symptoms   Negative: COVID-19 vaccine reaction suspected (e.g., fever, headache, muscle aches) occurring 1 to 3 days after getting vaccine   Negative: COVID-19 vaccine, questions about   Negative: Lives with someone known to have influenza (flu test positive) and flu-like symptoms (e.g., cough, runny nose, sore throat, SOB; with or without fever)   Negative: Possible COVID-19 symptoms and triager concerned about severity of symptoms or other causes   Negative: COVID-19 and breastfeeding, questions about   Negative: SEVERE or constant chest pain or pressure  (Exception: Mild central chest pain, present only when coughing.)   Negative: " MODERATE difficulty breathing (e.g., speaks in phrases, SOB even at rest, pulse 100-120)   Negative: Headache and stiff neck (can't touch chin to chest)   Negative: Chest pain or pressure  (Exception: MILD central chest pain, present only when coughing.)   Negative: Oxygen level (e.g., pulse oximetry) 90% or lower   Negative: Drinking very little and dehydration suspected (e.g., no urine > 12 hours, very dry mouth, very lightheaded)   Negative: Patient sounds very sick or weak to the triager   Negative: MILD difficulty breathing (e.g., minimal/no SOB at rest, SOB with walking, pulse <100)   Negative: Fever > 103 F (39.4 C)    Protocols used: Coronavirus (COVID-19) Diagnosed or [id number]

## 2024-05-23 NOTE — PROGRESS NOTES
Assessment & Plan     Infection due to 2019 novel coronavirus  Offered paxlovid. Met criteria. Discussed EUA and indication/side effect/benefit.  She wishes to wait and hold.    Symptomatic treatments.             No follow-ups on file.    Deric Nice MD  [facility]    Carlos Chapa is a 73 year old female who presents to clinic today for the following health issues:  Chief Complaint   Patient presents with    COVID positive      Positive COVID tested today symptoms are cough, muscles and fever      HPI    Here with concern about COVID  COVID pos today  Cough and ache  Fever  No SOB    First time with COVID    Risk factors    Wonders about paxlovid/meds            Review of Systems        Objective    /74   Pulse 77   Temp 99.9  F (37.7  C)   Resp 18   Wt 46.7 kg (103 lb)   LMP  (LMP Unknown)   SpO2 96%   BMI 18.54 kg/m    Physical Exam  Vitals and nursing note reviewed.   Constitutional:       Appearance: Normal appearance.   HENT:      Right Ear: Tympanic membrane and ear canal normal.      Left Ear: Tympanic membrane and ear canal normal.      Mouth/Throat:      Mouth: Mucous membranes are moist.   Eyes:      Pupils: Pupils are equal, round, and reactive to light.   Cardiovascular:      Rate and Rhythm: Normal rate and regular rhythm.      Pulses: Normal pulses.      Heart sounds: Normal heart sounds.   Pulmonary:      Effort: Pulmonary effort is normal.      Breath sounds: Normal breath sounds.   Neurological:      Mental Status: She is alert.

## 2024-06-12 ENCOUNTER — TRANSFERRED RECORDS (OUTPATIENT)
Dept: HEALTH INFORMATION MANAGEMENT | Facility: CLINIC | Age: 74
End: 2024-06-12
Payer: MEDICARE

## 2024-06-28 ENCOUNTER — TRANSFERRED RECORDS (OUTPATIENT)
Dept: HEALTH INFORMATION MANAGEMENT | Facility: CLINIC | Age: 74
End: 2024-06-28
Payer: MEDICARE

## 2024-06-29 ENCOUNTER — HEALTH MAINTENANCE LETTER (OUTPATIENT)
Age: 74
End: 2024-06-29

## 2024-10-01 ENCOUNTER — TRANSFERRED RECORDS (OUTPATIENT)
Dept: HEALTH INFORMATION MANAGEMENT | Facility: CLINIC | Age: 74
End: 2024-10-01
Payer: MEDICARE

## 2024-10-01 ENCOUNTER — MEDICAL CORRESPONDENCE (OUTPATIENT)
Dept: HEALTH INFORMATION MANAGEMENT | Facility: CLINIC | Age: 74
End: 2024-10-01
Payer: MEDICARE

## 2024-10-10 DIAGNOSIS — I10 ESSENTIAL HYPERTENSION: ICD-10-CM

## 2024-10-10 RX ORDER — IRBESARTAN AND HYDROCHLOROTHIAZIDE 150; 12.5 MG/1; MG/1
1 TABLET, FILM COATED ORAL DAILY
Qty: 90 TABLET | Refills: 0 | Status: SHIPPED | OUTPATIENT
Start: 2024-10-10 | End: 2024-10-25

## 2024-10-11 DIAGNOSIS — I25.10 ASHD (ARTERIOSCLEROTIC HEART DISEASE): ICD-10-CM

## 2024-10-11 RX ORDER — ATORVASTATIN CALCIUM 40 MG/1
40 TABLET, FILM COATED ORAL AT BEDTIME
Qty: 90 TABLET | Refills: 0 | Status: SHIPPED | OUTPATIENT
Start: 2024-10-11 | End: 2024-10-25

## 2024-10-21 SDOH — HEALTH STABILITY: PHYSICAL HEALTH: ON AVERAGE, HOW MANY MINUTES DO YOU ENGAGE IN EXERCISE AT THIS LEVEL?: 60 MIN

## 2024-10-21 SDOH — HEALTH STABILITY: PHYSICAL HEALTH: ON AVERAGE, HOW MANY DAYS PER WEEK DO YOU ENGAGE IN MODERATE TO STRENUOUS EXERCISE (LIKE A BRISK WALK)?: 7 DAYS

## 2024-10-21 ASSESSMENT — SOCIAL DETERMINANTS OF HEALTH (SDOH): HOW OFTEN DO YOU GET TOGETHER WITH FRIENDS OR RELATIVES?: ONCE A WEEK

## 2024-10-25 ENCOUNTER — OFFICE VISIT (OUTPATIENT)
Dept: INTERNAL MEDICINE | Facility: CLINIC | Age: 74
End: 2024-10-25
Payer: MEDICARE

## 2024-10-25 VITALS
BODY MASS INDEX: 17.75 KG/M2 | HEIGHT: 63 IN | SYSTOLIC BLOOD PRESSURE: 132 MMHG | HEART RATE: 63 BPM | DIASTOLIC BLOOD PRESSURE: 74 MMHG | OXYGEN SATURATION: 100 % | RESPIRATION RATE: 14 BRPM | TEMPERATURE: 96.2 F | WEIGHT: 100.2 LBS

## 2024-10-25 DIAGNOSIS — E55.9 VITAMIN D DEFICIENCY: ICD-10-CM

## 2024-10-25 DIAGNOSIS — R15.9 ANAL SPHINCTER INCONTINENCE: ICD-10-CM

## 2024-10-25 DIAGNOSIS — Z95.5 S/P CORONARY ARTERY STENT PLACEMENT: Chronic | ICD-10-CM

## 2024-10-25 DIAGNOSIS — E13.9 LADA (LATENT AUTOIMMUNE DIABETES IN ADULTS), MANAGED AS TYPE 1 (H): ICD-10-CM

## 2024-10-25 DIAGNOSIS — E10.9 CONTROLLED TYPE 1 DIABETES MELLITUS WITHOUT COMPLICATION (H): Primary | ICD-10-CM

## 2024-10-25 DIAGNOSIS — G56.00 CARPAL TUNNEL SYNDROME, UNSPECIFIED LATERALITY: ICD-10-CM

## 2024-10-25 DIAGNOSIS — R53.83 FATIGUE, UNSPECIFIED TYPE: ICD-10-CM

## 2024-10-25 DIAGNOSIS — I25.10 ASHD (ARTERIOSCLEROTIC HEART DISEASE): ICD-10-CM

## 2024-10-25 DIAGNOSIS — E78.2 MIXED HYPERLIPIDEMIA: Chronic | ICD-10-CM

## 2024-10-25 DIAGNOSIS — Z17.0 MALIGNANT NEOPLASM OF LOWER-OUTER QUADRANT OF RIGHT BREAST OF FEMALE, ESTROGEN RECEPTOR POSITIVE (H): ICD-10-CM

## 2024-10-25 DIAGNOSIS — C50.511 MALIGNANT NEOPLASM OF LOWER-OUTER QUADRANT OF RIGHT BREAST OF FEMALE, ESTROGEN RECEPTOR POSITIVE (H): ICD-10-CM

## 2024-10-25 DIAGNOSIS — Z96.41 INSULIN PUMP IN PLACE: ICD-10-CM

## 2024-10-25 DIAGNOSIS — I10 ESSENTIAL HYPERTENSION: ICD-10-CM

## 2024-10-25 DIAGNOSIS — M81.0 AGE-RELATED OSTEOPOROSIS WITHOUT CURRENT PATHOLOGICAL FRACTURE: ICD-10-CM

## 2024-10-25 LAB
ERYTHROCYTE [DISTWIDTH] IN BLOOD BY AUTOMATED COUNT: 13.6 % (ref 10–15)
HCT VFR BLD AUTO: 45.6 % (ref 35–47)
HGB BLD-MCNC: 14.9 G/DL (ref 11.7–15.7)
MCH RBC QN AUTO: 30.4 PG (ref 26.5–33)
MCHC RBC AUTO-ENTMCNC: 32.7 G/DL (ref 31.5–36.5)
MCV RBC AUTO: 93 FL (ref 78–100)
PLATELET # BLD AUTO: 263 10E3/UL (ref 150–450)
RBC # BLD AUTO: 4.9 10E6/UL (ref 3.8–5.2)
WBC # BLD AUTO: 7.5 10E3/UL (ref 4–11)

## 2024-10-25 PROCEDURE — 90662 IIV NO PRSV INCREASED AG IM: CPT | Performed by: INTERNAL MEDICINE

## 2024-10-25 PROCEDURE — 82306 VITAMIN D 25 HYDROXY: CPT | Performed by: INTERNAL MEDICINE

## 2024-10-25 PROCEDURE — G0439 PPPS, SUBSEQ VISIT: HCPCS | Performed by: INTERNAL MEDICINE

## 2024-10-25 PROCEDURE — G0008 ADMIN INFLUENZA VIRUS VAC: HCPCS | Performed by: INTERNAL MEDICINE

## 2024-10-25 PROCEDURE — 80076 HEPATIC FUNCTION PANEL: CPT | Performed by: INTERNAL MEDICINE

## 2024-10-25 PROCEDURE — 36415 COLL VENOUS BLD VENIPUNCTURE: CPT | Performed by: INTERNAL MEDICINE

## 2024-10-25 PROCEDURE — 85027 COMPLETE CBC AUTOMATED: CPT | Performed by: INTERNAL MEDICINE

## 2024-10-25 PROCEDURE — 80061 LIPID PANEL: CPT | Performed by: INTERNAL MEDICINE

## 2024-10-25 PROCEDURE — 90480 ADMN SARSCOV2 VAC 1/ONLY CMP: CPT | Performed by: INTERNAL MEDICINE

## 2024-10-25 PROCEDURE — 91320 SARSCV2 VAC 30MCG TRS-SUC IM: CPT | Performed by: INTERNAL MEDICINE

## 2024-10-25 RX ORDER — IRBESARTAN AND HYDROCHLOROTHIAZIDE 150; 12.5 MG/1; MG/1
1 TABLET, FILM COATED ORAL DAILY
Qty: 90 TABLET | Refills: 3 | Status: SHIPPED | OUTPATIENT
Start: 2024-10-25

## 2024-10-25 RX ORDER — ATORVASTATIN CALCIUM 40 MG/1
40 TABLET, FILM COATED ORAL AT BEDTIME
Qty: 90 TABLET | Refills: 3 | Status: SHIPPED | OUTPATIENT
Start: 2024-10-25

## 2024-10-25 ASSESSMENT — PAIN SCALES - GENERAL: PAINLEVEL_OUTOF10: NO PAIN (0)

## 2024-10-25 NOTE — PROGRESS NOTES
Preventive Care Visit  [facility]  Jeana Al MD, Internal Medicine  Oct 25, 2024      Assessment & Plan     Controlled type 1 diabetes mellitus without complication (H)  Excellent control on an inslin pump   - Hepatic panel (Albumin, ALT, AST, Bili, Alk Phos, TP); Future  - CBC with platelets; Future  - Hepatic panel (Albumin, ALT, AST, Bili, Alk Phos, TP)  - CBC with platelets    Essential hypertension  Well controlld   - irbesartan-hydrochlorothiazide (AVALIDE) 150-12.5 MG tablet; Take 1 tablet by mouth daily.    ASHD (arteriosclerotic heart disease)  On a statin   - Lipid panel reflex to direct LDL Non-fasting; Future  - atorvastatin (LIPITOR) 40 MG tablet; Take 1 tablet (40 mg) by mouth at bedtime.  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), managed as type 1 (H)    - Orthotics, Mastectomy and Custom Compression Orders    Insulin pump in place      Mixed hyperlipidemia      Age-related osteoporosis without current pathological fracture  Bone density due 2026    Carpal tunnel syndrome, unspecified laterality      S/P coronary artery stent placement      Fatigue, unspecified type      Malignant neoplasm of lower-outer quadrant of right breast of female, estrogen receptor positive (H)  S/p bilateral mastectomy     Vitamin D deficiency    - Vitamin D Deficiency; Future  - Vitamin D Deficiency    Anal sphincter incontinence  Is seeing colorectal   Advise bulking agents like Citrucel.  Could consider pelvic floor therapy at some point.            Counseling  Appropriate preventive services were addressed with this patient via screening, questionnaire, or discussion as appropriate for fall prevention, nutrition, physical activity, Tobacco-use cessation, social engagement, weight loss and cognition.  Checklist reviewing preventive services available has been given to the patient.  Reviewed patient's diet, addressing concerns and/or  questions.           Subjective   Sammi is a 73 year old, presenting for the following:  Amazing Aic , in the middle of the day basal rate was increased I unit   Lab Results   Component Value Date    A1C 5.9 10/26/2023    A1C 5.9 10/19/2020    A1C 6.0 09/24/2018    A1C 6.0 10/10/2016    A1C 5.7 10/07/2015       In may and caprice  went on 300 miles [facility] (Patient reports they are here for annual physical. Interested in flu and covid shots. Said Dr. Al told her a year ago she would need to schedule a colonscopy. Wears orthodics in shoes, would Dr. Al be able to prescribe new orthodics?)        10/25/2024    12:59 PM   Additional Questions   Roomed by Carl   Accompanied by alone         10/25/2024    12:59 PM   Patient Reported Additional Medications   Patient reports taking the following new medications none           HPI          Health Care Directive  Patient does not have a Health Care Directive: Patient states has Advance Directive and will bring in a copy to clinic.      10/21/2024   General Health   How would you rate your overall physical health? Excellent   Feel stress (tense, anxious, or unable to sleep) Not at all            10/21/2024   Nutrition   Diet: Regular (no restrictions)    Vegetarian/vegan       Multiple values from one day are sorted in reverse-chronological order         10/21/2024   Exercise   Days per week of moderate/strenous exercise 7 days   Average minutes spent exercising at this level 60 min            10/21/2024   Social Factors   Frequency of gathering with friends or relatives Once a week   Worry food won't last until get money to buy more No   Food not last or not have enough money for food? No   Do you have housing? (Housing is defined as stable permanent housing and does not include staying ouside in a car, in a tent, in an abandoned building, in an overnight shelter, or couch-surfing.) Yes   Are you worried about losing your housing? No   Lack of  transportation? No   Unable to get utilities (heat,electricity)? No            10/21/2024   Fall Risk   Fallen 2 or more times in the past year? No    Trouble with walking or balance? No        Patient-reported          10/21/2024   Activities of Daily Living- Home Safety   Needs help with the following daily activites None of the above   Safety concerns in the home None of the above            10/21/2024   Dental   Dentist two times every year? Yes            10/21/2024   Hearing Screening   Hearing concerns? None of the above            10/21/2024   Driving Risk Screening   Patient/family members have concerns about driving No            10/21/2024   General Alertness/Fatigue Screening   Have you been more tired than usual lately? No            10/21/2024   Urinary Incontinence Screening   Bothered by leaking urine in past 6 months No            10/21/2024   TB Screening   Were you born outside of the [place]? No            Today's PHQ-2 Score:       10/25/2024    12:53 PM   PHQ-2 (  [employer])   Q1: Little interest or pleasure in doing things 0    Q2: Feeling down, depressed or hopeless 0    PHQ-2 Score 0    Q1: Little interest or pleasure in doing things Not at all   Q2: Feeling down, depressed or hopeless Not at all   PHQ-2 Score 0       Patient-reported           10/21/2024   Substance Use   Alcohol more than 3/day or more than 7/wk No   Do you have a current opioid prescription? No   How severe/bad is pain from 1 to 10? 0/10 (No Pain)   Do you use any other substances recreationally? No        Social History     Tobacco Use    Smoking status: Former     Current packs/day: 0.00     Types: Cigarettes     Quit date: 1985     Years since quittin.1     Passive exposure: Past    Smokeless tobacco: Never   Vaping Use    Vaping status: Never Used   Substance Use Topics    Alcohol use: Yes     Alcohol/week: 5.0 standard drinks of alcohol    Drug use: No           10/24/2022   LAST FHS-7 RESULTS   1st degree  relative breast or ovarian cancer Yes    Any relative bilateral breast cancer No    Any male have breast cancer No    Any ONE woman have BOTH breast AND ovarian cancer Yes    Any woman with breast cancer before 50yrs No    2 or more relatives with breast AND/OR ovarian cancer No    2 or more relatives with breast AND/OR bowel cancer No        Patient-reported            ASCVD Risk   The 10-year ASCVD risk score (Tae LARA, et al., 2019) is: 23.6%    Values used to calculate the score:      Age: 73 years      Sex: Female      Is Non- : No      Diabetic: Yes      Tobacco smoker: No      Systolic Blood Pressure: 132 mmHg      Is BP treated: Yes      HDL Cholesterol: 92 mg/dL      Total Cholesterol: 159 mg/dL            Reviewed and updated as needed this visit by Provider                      Current providers sharing in care for this patient include:  Patient Care Team:  Jeana Al MD as PCP - General (Internal Medicine)  Gagan Sanchez MD as MD (Hematology & Oncology)  Jeana Al MD as Assigned PCP  Gayatri More APRN CNP as Nurse Practitioner (Colon & Rectal)  Gayatri More APRN CNP as Assigned Surgical Provider    The following health maintenance items are reviewed in Epic and correct as of today:  Health Maintenance   Topic Date Due    DIABETIC FOOT EXAM  Never done    A1C  04/26/2024    ANNUAL REVIEW OF HM ORDERS  06/08/2024    INFLUENZA VACCINE (1) 09/01/2024    COVID-19 Vaccine (7 - 2024-25 season) 09/01/2024    MEDICARE ANNUAL WELLNESS VISIT  10/26/2024    BMP  10/26/2024    LIPID  10/26/2024    MICROALBUMIN  10/26/2024    COLORECTAL CANCER SCREENING  04/20/2025    EYE EXAM  10/01/2025    FALL RISK ASSESSMENT  10/25/2025    ADVANCE CARE PLANNING  10/26/2028    DTAP/TDAP/TD IMMUNIZATION (5 - Td or Tdap) 01/02/2031    DEXA  10/17/2038    HEPATITIS C SCREENING  Completed    PHQ-2 (once per calendar year)  Completed     "Pneumococcal Vaccine: 65+ Years  Completed    ZOSTER IMMUNIZATION  Completed    RSV VACCINE  Completed    HPV IMMUNIZATION  Aged Out    MENINGITIS IMMUNIZATION  Aged Out    RSV MONOCLONAL ANTIBODY  Aged Out    MAMMO SCREENING  Discontinued            Objective    Exam  /74 (BP Location: Left arm, Patient Position: Sitting, Cuff Size: Adult Regular)   Pulse 63   Temp (!) 96.2  F (35.7  C)   Resp 14   Ht 1.6 m (5' 3\")   Wt 45.5 kg (100 lb 3.2 oz)   LMP  (LMP Unknown)   SpO2 100%   Breastfeeding No   BMI 17.75 kg/m     Estimated body mass index is 17.75 kg/m  as calculated from the following:    Height as of this encounter: 1.6 m (5' 3\").    Weight as of this encounter: 45.5 kg (100 lb 3.2 oz).    Physical Exam  GENERAL: alert and no distress  NECK: no adenopathy, no asymmetry, masses, or scars  RESP: lungs clear to auscultation - no rales, rhonchi or wheezes  CV: regular rate and rhythm, normal S1 S2, no S3 or S4, no murmur, click or rub, no peripheral edema  ABDOMEN: soft, nontender, no hepatosplenomegaly, no masses and bowel sounds normal  MS: no gross musculoskeletal defects noted, no edema  Diabetic foot :Inspection is normal pulses are palpable monofilament testing is normal there is no edema  Preuclerative callous present       10/25/2024   Mini Cog   Clock Draw Score 2 Normal   3 Item Recall 3 objects recalled   Mini Cog Total Score 5                 Signed Electronically by: Jeana Al MD    "

## 2024-10-26 LAB
ALBUMIN SERPL BCG-MCNC: 4.8 G/DL (ref 3.5–5.2)
ALP SERPL-CCNC: 76 U/L (ref 40–150)
ALT SERPL W P-5'-P-CCNC: 25 U/L (ref 0–50)
AST SERPL W P-5'-P-CCNC: 36 U/L (ref 0–45)
BILIRUB DIRECT SERPL-MCNC: 0.31 MG/DL (ref 0–0.3)
BILIRUB SERPL-MCNC: 1 MG/DL
CHOLEST SERPL-MCNC: 169 MG/DL
FASTING STATUS PATIENT QL REPORTED: NORMAL
HDLC SERPL-MCNC: 101 MG/DL
LDLC SERPL CALC-MCNC: 59 MG/DL
NONHDLC SERPL-MCNC: 68 MG/DL
PROT SERPL-MCNC: 7.5 G/DL (ref 6.4–8.3)
TRIGL SERPL-MCNC: 46 MG/DL
VIT D+METAB SERPL-MCNC: 39 NG/ML (ref 20–50)

## 2024-11-05 ENCOUNTER — OFFICE VISIT (OUTPATIENT)
Dept: CARDIOLOGY | Facility: CLINIC | Age: 74
End: 2024-11-05
Payer: MEDICARE

## 2024-11-05 VITALS
HEIGHT: 63 IN | DIASTOLIC BLOOD PRESSURE: 64 MMHG | WEIGHT: 102.6 LBS | SYSTOLIC BLOOD PRESSURE: 134 MMHG | OXYGEN SATURATION: 100 % | RESPIRATION RATE: 16 BRPM | HEART RATE: 67 BPM | BODY MASS INDEX: 18.18 KG/M2

## 2024-11-05 DIAGNOSIS — E10.9 CONTROLLED TYPE 1 DIABETES MELLITUS WITHOUT COMPLICATION (H): ICD-10-CM

## 2024-11-05 DIAGNOSIS — Z95.5 S/P CORONARY ARTERY STENT PLACEMENT: Chronic | ICD-10-CM

## 2024-11-05 DIAGNOSIS — I10 ESSENTIAL HYPERTENSION: ICD-10-CM

## 2024-11-05 DIAGNOSIS — I25.10 CORONARY ARTERY DISEASE INVOLVING NATIVE CORONARY ARTERY OF NATIVE HEART WITHOUT ANGINA PECTORIS: Primary | ICD-10-CM

## 2024-11-05 DIAGNOSIS — E78.2 MIXED HYPERLIPIDEMIA: ICD-10-CM

## 2024-11-05 PROCEDURE — G2211 COMPLEX E/M VISIT ADD ON: HCPCS | Performed by: INTERNAL MEDICINE

## 2024-11-05 PROCEDURE — 99214 OFFICE O/P EST MOD 30 MIN: CPT | Performed by: INTERNAL MEDICINE

## 2024-11-05 RX ORDER — ACYCLOVIR 400 MG/1
TABLET ORAL
COMMUNITY
Start: 2024-07-15

## 2024-11-05 NOTE — LETTER
"11/5/2024    Jeana Al MD  [address] [facility] [address]    RE: Sammi Guadalupe       Dear Colleague,     I had the pleasure of seeing Sammi Guadalupe in the [facility].      [facility]  [phone]          Assessment/Recommendations   Patient with known coronary artery disease with distant history of stent in the LAD.  She did not have much in the way of symptoms and she has type 1 diabetes so we do screening stress test periodically and it has been almost 2 years.  We will get a stress echocardiogram for further evaluation.    Her LDL cholesterol is at goal, her blood pressure is at goal, and she exercises 6 days out of 7.  She did 230 miles of walking on a trail in [place] and this was preceded by 100 mile walk on the same [place] camping out at night.  She had no difficulty.    If the stress test is unremarkable we will see her back next year but of course would be happy to see her sooner if questions or problems arise.    The longitudinal plan of care for the diagnosis(es)/condition(s) as documented were addressed during this visit. Due to the added complexity in care, I will continue to support Sammi Guadalupe in the subsequent management and with ongoing continuity of care.        History of Present Illness/Subjective    Ms. Sammi Guadalupe is a 73 year old female with known coronary artery disease with history of intracoronary stent in the LAD.  She did not have much in the way of symptoms with this and we have done periodic stress testing.  She feels good.  No chest pain, orthopnea, paroxysmal nocturnal dyspnea, peripheral edema, syncope, near syncope or palpitations.  She exercises regularly.       Physical Examination Review of Systems   /64 (BP Location: Left arm, Patient Position: Sitting, Cuff Size: Adult Small)   Pulse 67   Resp 16   Ht 1.6 m (5' 3\")   Wt 46.5 kg (102 lb 9.6 oz)   LMP  (LMP Unknown)   SpO2 100%   BMI 18.17 kg/m  " "  Body mass index is 18.17 kg/m .  Wt Readings from Last 3 Encounters:   11/05/24 46.5 kg (102 lb 9.6 oz)   10/25/24 45.5 kg (100 lb 3.2 oz)   05/23/24 46.7 kg (103 lb)     General Appearance:   Alert, cooperative and in no acute distress.   ENT/Mouth: Pink/moist oral mucosa   EYES:  no scleral icterus, normal conjunctivae   Neck: JVP normal. No Hepatojugular reflux. Thyroid not visualized.   Chest/Lungs:   Lungs are clear to auscultation, equal chest wall expansion.   Cardiovascular:   S1, S2 without murmur ,clicks or rubs. Brachial, radial and posterior tibial pulses are intact and symetric. No carotid bruits noted   Abdomen:  Nontender.    Extremities: No cyanosis, clubbing or edema   Skin: no xanthelasma, warm.    Neurologic: normal arm movement bilateral, no tremors     Psychiatric: Appropriate affect.      Encounter Vitals  BP: 134/64  Pulse: 67  Resp: 16  SpO2: 100 %  Weight: 46.5 kg (102 lb 9.6 oz) (shoes off)  Height: 160 cm (5' 3\") (per patient)                                           Medical History  Surgical History Family History Social History   Past Medical History:   Diagnosis Date     ASHD (arteriosclerotic heart disease) 2012    CT scan, calcium score 428 lad artery deployment of a non-drug-eluting stent in the LAD     DVT (deep venous thrombosis) (H)      Family history of myocardial infarction     Past Surgical History:   Procedure Laterality Date     BACK SURGERY       IR MISCELLANEOUS PROCEDURE  1/27/2014     IR PORT REMOVAL  2/6/2015     LAMINECTOMY AND MICRODISCECTOMY CERVICAL SPINE N/A      LAPAROSCOPIC HYSTERECTOMY N/A 12/12/2016    Procedure: ROBOTIC TOTAL LAPAROSCOPIC HYSTERECTOMY, BILATERAL SALPINGO-OOPHORECTOMY, CYSTOSCOPY;  Surgeon: Sohan Ramirez MD;  Location: [facility];  Service:      MASTECTOMY Bilateral      RELEASE CARPAL TUNNEL Bilateral     Family History   Problem Relation Age of Onset     Cancer Mother      Coronary Artery Disease Mother      Cancer Father      " Heart Disease Father      CABG Father      Heart Disease Sister     Social History     Socioeconomic History     Marital status: Single     Spouse name: Not on file     Number of children: Not on file     Years of education: Not on file     Highest education level: Not on file   Occupational History     Not on file   Tobacco Use     Smoking status: Former     Current packs/day: 0.00     Types: Cigarettes     Quit date: 1985     Years since quittin.1     Passive exposure: Past     Smokeless tobacco: Never   Vaping Use     Vaping status: Never Used   Substance and Sexual Activity     Alcohol use: Yes     Alcohol/week: 5.0 standard drinks of alcohol     Drug use: Never     Sexual activity: Never   Other Topics Concern     Not on file   Social History Narrative     Not on file     Social Drivers of Health     Financial Resource Strain: Low Risk  (10/21/2024)    Financial Resource Strain      Within the past 12 months, have you or your family members you live with been unable to get utilities (heat, electricity) when it was really needed?: No   Food Insecurity: Low Risk  (10/21/2024)    Food Insecurity      Within the past 12 months, did you worry that your food would run out before you got money to buy more?: No      Within the past 12 months, did the food you bought just not last and you didn t have money to get more?: No   Transportation Needs: Low Risk  (10/21/2024)    Transportation Needs      Within the past 12 months, has lack of transportation kept you from medical appointments, getting your medicines, non-medical meetings or appointments, work, or from getting things that you need?: No   Physical Activity: Sufficiently Active (10/21/2024)    Exercise Vital Sign      Days of Exercise per Week: 7 days      Minutes of Exercise per Session: 60 min   Stress: No Stress Concern Present (10/21/2024)    [employer] [place] [employer] - Occupational Stress Questionnaire      Feeling of Stress : Not at  all   Social Connections: Unknown (10/21/2024)    Social Connection and Isolation Panel [NHANES]      Frequency of Communication with Friends and Family: Not on file      Frequency of Social Gatherings with Friends and Family: Once a week      Attends Anabaptist Services: Not on file      Active Member of Clubs or Organizations: Not on file      Attends Club or Organization Meetings: Not on file      Marital Status: Not on file   Interpersonal Safety: Low Risk  (10/25/2024)    Interpersonal Safety      Do you feel physically and emotionally safe where you currently live?: Yes      Within the past 12 months, have you been hit, slapped, kicked or otherwise physically hurt by someone?: No      Within the past 12 months, have you been humiliated or emotionally abused in other ways by your partner or ex-partner?: No   Housing Stability: Low Risk  (10/21/2024)    Housing Stability      Do you have housing? : Yes      Are you worried about losing your housing?: No          Medications  Allergies   Current Outpatient Medications   Medication Sig Dispense Refill     aspirin 81 MG tablet [ASPIRIN 81 MG TABLET] Take 81 mg by mouth daily.       atorvastatin (LIPITOR) 40 MG tablet Take 1 tablet (40 mg) by mouth at bedtime. 90 tablet 3     b complex vitamins tablet [B COMPLEX VITAMINS TABLET] Take 1 tablet by mouth daily.       blood glucose test strips [BLOOD GLUCOSE TEST STRIPS] Dispense item covered by pt ins. Test 6 times a day       calcium carbonate (OS-PATTI) 600 mg (1,500 mg) tablet [CALCIUM CARBONATE (OS-PATTI) 600 MG (1,500 MG) TABLET] Take 1,200 mg by mouth bedtime. Plus magnesium        cholecalciferol, vitamin D3, (VITAMIN D3) 2,000 unit cap [CHOLECALCIFEROL, VITAMIN D3, (VITAMIN D3) 2,000 UNIT CAP] Take 2,000 Units by mouth bedtime.        Continuous Glucose  (DEXCOM G7 ) TERESA        insulin lispro (HUMALOG) 100 unit/mL injection [INSULIN LISPRO (HUMALOG) 100 UNIT/ML INJECTION] 20 Units daily.        irbesartan-hydrochlorothiazide (AVALIDE) 150-12.5 MG tablet Take 1 tablet by mouth daily. 90 tablet 3     lysine 500 mg Tab [LYSINE 500 MG TAB] Take 500 mg by mouth 2 (two) times a day.       methylcellulose (CITRUCEL) powder Take 3 teaspoonful by mouth daily.       multivitamin therapeutic tablet [MULTIVITAMIN THERAPEUTIC TABLET] Take 1 tablet by mouth daily.      Allergies   Allergen Reactions     Nitroglycerin Other (See Comments)     Blood Pressure drops significantly.     Sulfa Antibiotics Dizziness     Dizziness and doesn't feel normal.         Lab Results    Chemistry/lipid CBC Cardiac Enzymes/BNP/TSH/INR   Lab Results   Component Value Date    CHOL 169 10/25/2024     10/25/2024    TRIG 46 10/25/2024    BUN 12.3 10/26/2023     10/26/2023    CO2 29 10/26/2023    Lab Results   Component Value Date    WBC 7.5 10/25/2024    HGB 14.9 10/25/2024    HCT 45.6 10/25/2024    MCV 93 10/25/2024     10/25/2024    Lab Results   Component Value Date    TSH 3.00 10/26/2023                                                Thank you for allowing me to participate in the care of your patient.      Sincerely,     Rolf Gipson MD     [facility]  cc:   Rolf Gipson MD  [address]  [place] [address]

## 2024-11-05 NOTE — PROGRESS NOTES
"    [facility]  [phone]          Assessment/Recommendations   Patient with known coronary artery disease with distant history of stent in the LAD.  She did not have much in the way of symptoms and she has type 1 diabetes so we do screening stress test periodically and it has been almost 2 years.  We will get a stress echocardiogram for further evaluation.    Her LDL cholesterol is at goal, her blood pressure is at goal, and she exercises 6 days out of 7.  She did 230 miles of walking on a trail in [place] and this was preceded by 100 mile walk on the same [place] camping out at night.  She had no difficulty.    If the stress test is unremarkable we will see her back next year but of course would be happy to see her sooner if questions or problems arise.    The longitudinal plan of care for the diagnosis(es)/condition(s) as documented were addressed during this visit. Due to the added complexity in care, I will continue to support Sammi Guadalupe in the subsequent management and with ongoing continuity of care.        History of Present Illness/Subjective    Ms. Sammi Guadalupe is a 73 year old female with known coronary artery disease with history of intracoronary stent in the LAD.  She did not have much in the way of symptoms with this and we have done periodic stress testing.  She feels good.  No chest pain, orthopnea, paroxysmal nocturnal dyspnea, peripheral edema, syncope, near syncope or palpitations.  She exercises regularly.       Physical Examination Review of Systems   /64 (BP Location: Left arm, Patient Position: Sitting, Cuff Size: Adult Small)   Pulse 67   Resp 16   Ht 1.6 m (5' 3\")   Wt 46.5 kg (102 lb 9.6 oz)   LMP  (LMP Unknown)   SpO2 100%   BMI 18.17 kg/m    Body mass index is 18.17 kg/m .  Wt Readings from Last 3 Encounters:   11/05/24 46.5 kg (102 lb 9.6 oz)   10/25/24 45.5 kg (100 lb 3.2 oz)   05/23/24 46.7 kg (103 lb)     General Appearance:   Alert, " "cooperative and in no acute distress.   ENT/Mouth: Pink/moist oral mucosa   EYES:  no scleral icterus, normal conjunctivae   Neck: JVP normal. No Hepatojugular reflux. Thyroid not visualized.   Chest/Lungs:   Lungs are clear to auscultation, equal chest wall expansion.   Cardiovascular:   S1, S2 without murmur ,clicks or rubs. Brachial, radial and posterior tibial pulses are intact and symetric. No carotid bruits noted   Abdomen:  Nontender.    Extremities: No cyanosis, clubbing or edema   Skin: no xanthelasma, warm.    Neurologic: normal arm movement bilateral, no tremors     Psychiatric: Appropriate affect.      Encounter Vitals  BP: 134/64  Pulse: 67  Resp: 16  SpO2: 100 %  Weight: 46.5 kg (102 lb 9.6 oz) (shoes off)  Height: 160 cm (5' 3\") (per patient)                                           Medical History  Surgical History Family History Social History   Past Medical History:   Diagnosis Date    ASHD (arteriosclerotic heart disease) 2012    CT scan, calcium score 428 lad artery deployment of a non-drug-eluting stent in the LAD    DVT (deep venous thrombosis) (H)     Family history of myocardial infarction     Past Surgical History:   Procedure Laterality Date    BACK SURGERY      IR MISCELLANEOUS PROCEDURE  1/27/2014    IR PORT REMOVAL  2/6/2015    LAMINECTOMY AND MICRODISCECTOMY CERVICAL SPINE N/A     LAPAROSCOPIC HYSTERECTOMY N/A 12/12/2016    Procedure: ROBOTIC TOTAL LAPAROSCOPIC HYSTERECTOMY, BILATERAL SALPINGO-OOPHORECTOMY, CYSTOSCOPY;  Surgeon: Sohan Ramirez MD;  Location: [facility];  Service:     MASTECTOMY Bilateral     RELEASE CARPAL TUNNEL Bilateral     Family History   Problem Relation Age of Onset    Cancer Mother     Coronary Artery Disease Mother     Cancer Father     Heart Disease Father     CABG Father     Heart Disease Sister     Social History     Socioeconomic History    Marital status: Single     Spouse name: Not on file    Number of children: Not on file    Years of education: " Not on file    Highest education level: Not on file   Occupational History    Not on file   Tobacco Use    Smoking status: Former     Current packs/day: 0.00     Types: Cigarettes     Quit date: 1985     Years since quittin.1     Passive exposure: Past    Smokeless tobacco: Never   Vaping Use    Vaping status: Never Used   Substance and Sexual Activity    Alcohol use: Yes     Alcohol/week: 5.0 standard drinks of alcohol    Drug use: Never    Sexual activity: Never   Other Topics Concern    Not on file   Social History Narrative    Not on file     Social Drivers of Health     Financial Resource Strain: Low Risk  (10/21/2024)    Financial Resource Strain     Within the past 12 months, have you or your family members you live with been unable to get utilities (heat, electricity) when it was really needed?: No   Food Insecurity: Low Risk  (10/21/2024)    Food Insecurity     Within the past 12 months, did you worry that your food would run out before you got money to buy more?: No     Within the past 12 months, did the food you bought just not last and you didn t have money to get more?: No   Transportation Needs: Low Risk  (10/21/2024)    Transportation Needs     Within the past 12 months, has lack of transportation kept you from medical appointments, getting your medicines, non-medical meetings or appointments, work, or from getting things that you need?: No   Physical Activity: Sufficiently Active (10/21/2024)    Exercise Vital Sign     Days of Exercise per Week: 7 days     Minutes of Exercise per Session: 60 min   Stress: No Stress Concern Present (10/21/2024)    [employer] - Occupational Stress Questionnaire     Feeling of Stress : Not at all   Social Connections: Unknown (10/21/2024)    Social Connection and Isolation Panel [NHANES]     Frequency of Communication with Friends and Family: Not on file     Frequency of Social Gatherings with Friends and Family: Once a week      Attends Episcopal Services: Not on file     Active Member of Clubs or Organizations: Not on file     Attends Club or Organization Meetings: Not on file     Marital Status: Not on file   Interpersonal Safety: Low Risk  (10/25/2024)    Interpersonal Safety     Do you feel physically and emotionally safe where you currently live?: Yes     Within the past 12 months, have you been hit, slapped, kicked or otherwise physically hurt by someone?: No     Within the past 12 months, have you been humiliated or emotionally abused in other ways by your partner or ex-partner?: No   Housing Stability: Low Risk  (10/21/2024)    Housing Stability     Do you have housing? : Yes     Are you worried about losing your housing?: No          Medications  Allergies   Current Outpatient Medications   Medication Sig Dispense Refill    aspirin 81 MG tablet [ASPIRIN 81 MG TABLET] Take 81 mg by mouth daily.      atorvastatin (LIPITOR) 40 MG tablet Take 1 tablet (40 mg) by mouth at bedtime. 90 tablet 3    b complex vitamins tablet [B COMPLEX VITAMINS TABLET] Take 1 tablet by mouth daily.      blood glucose test strips [BLOOD GLUCOSE TEST STRIPS] Dispense item covered by pt ins. Test 6 times a day      calcium carbonate (OS-PATTI) 600 mg (1,500 mg) tablet [CALCIUM CARBONATE (OS-PATTI) 600 MG (1,500 MG) TABLET] Take 1,200 mg by mouth bedtime. Plus magnesium       cholecalciferol, vitamin D3, (VITAMIN D3) 2,000 unit cap [CHOLECALCIFEROL, VITAMIN D3, (VITAMIN D3) 2,000 UNIT CAP] Take 2,000 Units by mouth bedtime.       Continuous Glucose  (DEXCOM G7 ) TERESA       insulin lispro (HUMALOG) 100 unit/mL injection [INSULIN LISPRO (HUMALOG) 100 UNIT/ML INJECTION] 20 Units daily.      irbesartan-hydrochlorothiazide (AVALIDE) 150-12.5 MG tablet Take 1 tablet by mouth daily. 90 tablet 3    lysine 500 mg Tab [LYSINE 500 MG TAB] Take 500 mg by mouth 2 (two) times a day.      methylcellulose (CITRUCEL) powder Take 3 teaspoonful by mouth daily.       multivitamin therapeutic tablet [MULTIVITAMIN THERAPEUTIC TABLET] Take 1 tablet by mouth daily.      Allergies   Allergen Reactions    Nitroglycerin Other (See Comments)     Blood Pressure drops significantly.    Sulfa Antibiotics Dizziness     Dizziness and doesn't feel normal.         Lab Results    Chemistry/lipid CBC Cardiac Enzymes/BNP/TSH/INR   Lab Results   Component Value Date    CHOL 169 10/25/2024     10/25/2024    TRIG 46 10/25/2024    BUN 12.3 10/26/2023     10/26/2023    CO2 29 10/26/2023    Lab Results   Component Value Date    WBC 7.5 10/25/2024    HGB 14.9 10/25/2024    HCT 45.6 10/25/2024    MCV 93 10/25/2024     10/25/2024    Lab Results   Component Value Date    TSH 3.00 10/26/2023

## 2024-11-13 ENCOUNTER — DOCUMENTATION ONLY (OUTPATIENT)
Dept: INTERNAL MEDICINE | Facility: CLINIC | Age: 74
End: 2024-11-13
Payer: MEDICARE

## 2024-11-13 DIAGNOSIS — E13.9 LADA (LATENT AUTOIMMUNE DIABETES IN ADULTS), MANAGED AS TYPE 1 (H): Primary | ICD-10-CM

## 2024-11-14 ENCOUNTER — HOSPITAL ENCOUNTER (OUTPATIENT)
Dept: CARDIOLOGY | Facility: CLINIC | Age: 74
Discharge: HOME | End: 2024-11-14
Attending: INTERNAL MEDICINE
Payer: MEDICARE

## 2024-11-14 DIAGNOSIS — I25.10 CORONARY ARTERY DISEASE INVOLVING NATIVE CORONARY ARTERY OF NATIVE HEART WITHOUT ANGINA PECTORIS: ICD-10-CM

## 2024-11-14 LAB
CV STRESS CURRENT BP HE: NORMAL
CV STRESS CURRENT HR HE: 109
CV STRESS CURRENT HR HE: 113
CV STRESS CURRENT HR HE: 114
CV STRESS CURRENT HR HE: 116
CV STRESS CURRENT HR HE: 118
CV STRESS CURRENT HR HE: 127
CV STRESS CURRENT HR HE: 132
CV STRESS CURRENT HR HE: 138
CV STRESS CURRENT HR HE: 138
CV STRESS CURRENT HR HE: 61
CV STRESS CURRENT HR HE: 65
CV STRESS CURRENT HR HE: 71
CV STRESS CURRENT HR HE: 74
CV STRESS CURRENT HR HE: 76
CV STRESS CURRENT HR HE: 76
CV STRESS CURRENT HR HE: 77
CV STRESS CURRENT HR HE: 78
CV STRESS CURRENT HR HE: 78
CV STRESS CURRENT HR HE: 80
CV STRESS CURRENT HR HE: 80
CV STRESS CURRENT HR HE: 81
CV STRESS CURRENT HR HE: 85
CV STRESS CURRENT HR HE: 87
CV STRESS CURRENT HR HE: 89
CV STRESS CURRENT HR HE: 91
CV STRESS CURRENT HR HE: 95
CV STRESS CURRENT HR HE: 95
CV STRESS CURRENT HR HE: 96
CV STRESS CURRENT HR HE: 97
CV STRESS CURRENT HR HE: 98
CV STRESS DEVIATION TIME HE: NORMAL
CV STRESS ECHO PERCENT HR HE: NORMAL
CV STRESS EXERCISE STAGE HE: NORMAL
CV STRESS EXERCISE STAGE REACHED HE: NORMAL
CV STRESS FINAL RESTING BP HE: NORMAL
CV STRESS FINAL RESTING HR HE: 76
CV STRESS MAX HR HE: 139
CV STRESS MAX TREADMILL GRADE HE: 16
CV STRESS MAX TREADMILL SPEED HE: 4.2
CV STRESS PEAK DIA BP HE: NORMAL
CV STRESS PEAK SYS BP HE: NORMAL
CV STRESS PHASE HE: NORMAL
CV STRESS PROTOCOL HE: NORMAL
CV STRESS REASON STOPPED HE: NORMAL
CV STRESS RESTING PT POSITION HE: NORMAL
CV STRESS RESTING PT POSITION HE: NORMAL
CV STRESS ST DEVIATION AMOUNT HE: NORMAL
CV STRESS ST DEVIATION ELEVATION HE: NORMAL
CV STRESS ST EVELATION AMOUNT HE: NORMAL
CV STRESS SYMPTOMS HE: NORMAL
CV STRESS TEST TYPE HE: NORMAL
CV STRESS TOTAL STAGE TIME MIN 1 HE: NORMAL
STRESS ECHO BASELINE DIASTOLIC HE: 80
STRESS ECHO BASELINE HR: 64
STRESS ECHO BASELINE SYSTOLIC BP: 124
STRESS ECHO LAST STRESS DIASTOLIC BP: 74
STRESS ECHO LAST STRESS HR: 138
STRESS ECHO LAST STRESS SYSTOLIC BP: 136
STRESS ECHO POST ESTIMATED WORKLOAD: 12.1
STRESS ECHO POST EXERCISE DUR MIN: 10
STRESS ECHO POST EXERCISE DUR SEC: 59
STRESS ECHO TARGET HR: 127

## 2024-11-14 PROCEDURE — 93325 DOPPLER ECHO COLOR FLOW MAPG: CPT | Mod: 26 | Performed by: INTERNAL MEDICINE

## 2024-11-14 PROCEDURE — 93016 CV STRESS TEST SUPVJ ONLY: CPT | Performed by: INTERNAL MEDICINE

## 2024-11-14 PROCEDURE — 93018 CV STRESS TEST I&R ONLY: CPT | Performed by: INTERNAL MEDICINE

## 2024-11-14 PROCEDURE — 93321 DOPPLER ECHO F-UP/LMTD STD: CPT | Mod: 26 | Performed by: INTERNAL MEDICINE

## 2024-11-14 PROCEDURE — 93350 STRESS TTE ONLY: CPT | Mod: 26 | Performed by: INTERNAL MEDICINE

## 2024-11-14 PROCEDURE — 93325 DOPPLER ECHO COLOR FLOW MAPG: CPT | Mod: TC

## 2024-12-10 ENCOUNTER — DOCUMENTATION ONLY (OUTPATIENT)
Dept: INTERNAL MEDICINE | Facility: CLINIC | Age: 74
End: 2024-12-10
Payer: MEDICARE

## 2025-01-05 ENCOUNTER — HEALTH MAINTENANCE LETTER (OUTPATIENT)
Age: 75
End: 2025-01-05

## 2025-01-06 ENCOUNTER — TRANSFERRED RECORDS (OUTPATIENT)
Dept: HEALTH INFORMATION MANAGEMENT | Facility: CLINIC | Age: 75
End: 2025-01-06
Payer: MEDICARE

## 2025-02-10 ENCOUNTER — ANCILLARY PROCEDURE (OUTPATIENT)
Dept: GENERAL RADIOLOGY | Facility: CLINIC | Age: 75
End: 2025-02-10
Attending: INTERNAL MEDICINE
Payer: MEDICARE

## 2025-02-10 ENCOUNTER — OFFICE VISIT (OUTPATIENT)
Dept: INTERNAL MEDICINE | Facility: CLINIC | Age: 75
End: 2025-02-10
Payer: MEDICARE

## 2025-02-10 VITALS
OXYGEN SATURATION: 98 % | HEIGHT: 63 IN | SYSTOLIC BLOOD PRESSURE: 117 MMHG | RESPIRATION RATE: 14 BRPM | BODY MASS INDEX: 18.12 KG/M2 | DIASTOLIC BLOOD PRESSURE: 68 MMHG | TEMPERATURE: 97.6 F | HEART RATE: 68 BPM | WEIGHT: 102.3 LBS

## 2025-02-10 DIAGNOSIS — G89.29 CHRONIC RIGHT SHOULDER PAIN: ICD-10-CM

## 2025-02-10 DIAGNOSIS — M25.511 CHRONIC RIGHT SHOULDER PAIN: ICD-10-CM

## 2025-02-10 DIAGNOSIS — I10 ESSENTIAL HYPERTENSION: ICD-10-CM

## 2025-02-10 DIAGNOSIS — E10.9 CONTROLLED TYPE 1 DIABETES MELLITUS WITHOUT COMPLICATION (H): Primary | ICD-10-CM

## 2025-02-10 PROCEDURE — 73030 X-RAY EXAM OF SHOULDER: CPT | Mod: TC | Performed by: RADIOLOGY

## 2025-02-10 PROCEDURE — 99214 OFFICE O/P EST MOD 30 MIN: CPT | Performed by: INTERNAL MEDICINE

## 2025-02-10 RX ORDER — MELOXICAM 15 MG/1
15 TABLET ORAL DAILY
Qty: 34 TABLET | Refills: 3 | Status: SHIPPED | OUTPATIENT
Start: 2025-02-10

## 2025-02-10 NOTE — PROGRESS NOTES
Assessment & Plan     Controlled type 1 diabetes mellitus without complication (H)  Sees endo at [facility] on an insulin pump       Essential hypertension      Chronic right shoulder pain  Symptoms just of a bicipital tendinitis due to mild tenderness over biceps tendon and triggering of the pain on her exiting in the front.  Other rotatory motions of the shoulder joint are preserved.  She has no trigger with neck movements and preserved neurological and motor function of the arm.  She has no other red flags suggesting red referral from the chest wall.  She has no weight loss shortness of breath or chest pain.  She has does a lot of weight training and that could have been a trigger recommend holding on weight training and going to physical therapy for rehabilitation exercises taking dedicated NSAIDs for at least a few weeks.  And then an Ortho referral if no improvement we can do baseline x-ray today and consider ultrasound musculoskeletal to delineate location of the tendinitis further.      In the dull nature referred pain is still possible but if the ultrasound is normal and she does not improve then further testing should be done including chest x-ray/chest CT.  And EMG  - Physical Therapy  Referral; Future  - meloxicam (MOBIC) 15 MG tablet; Take 1 tablet (15 mg) by mouth daily.  - Orthopedic  Referral; Future  - US MSK Rotator Cuff; Future  - XR Shoulder Right G/E 3 Views; Future                Subjective   Sammi is a 74 year old, presenting for the following health issues:  Ache on right side from neck to hand    Driving and holding sterring wheel imakes it worse     Sore arm (Soreness on the right arm and shoulder)      2/10/2025    10:49 AM   Additional Questions   Roomed by Pam   Accompanied by Alone     History of Present Illness       Reason for visit:  Dull ache going down my right arm  Symptom onset:  More than a month  Symptoms include:  Dull ache  Symptom intensity:  Mild  Symptom  "progression:  Staying the same  Had these symptoms before:  No  What makes it worse:  Certain types of movements   She is taking medications regularly.                     Objective    /68 (BP Location: Left arm, Patient Position: Sitting, Cuff Size: Adult Small)   Pulse 68   Temp 97.6  F (36.4  C) (Oral)   Resp 14   Ht 1.6 m (5' 3\")   Wt 46.4 kg (102 lb 4.8 oz)   LMP  (LMP Unknown)   SpO2 98%   BMI 18.12 kg/m    Body mass index is 18.12 kg/m .  Physical Exam   GENERAL: alert and no distress  NECK: no adenopathy, no asymmetry, masses, or scars  RESP: lungs clear to auscultation - no rales, rhonchi or wheezes  CV: regular rate and rhythm, normal S1 S2, no S3 or S4, no murmur, click or rub, no peripheral edema  MS: no gross musculoskeletal defects noted, no edema  MS: RUE exam shows normal strength and muscle mass, no deformities, no erythema, induration, or nodules, and some pain on forward flexion of arm some tenderness over bicipital tendon neck range of motion is normal.  Reflexes are symmetrical            Signed Electronically by: Jeana Al MD    "

## 2025-02-11 ENCOUNTER — PATIENT OUTREACH (OUTPATIENT)
Dept: CARE COORDINATION | Facility: CLINIC | Age: 75
End: 2025-02-11
Payer: MEDICARE

## 2025-02-11 ASSESSMENT — ACTIVITIES OF DAILY LIVING (ADL)
PLEASE_INDICATE_YOR_PRIMARY_REASON_FOR_REFERRAL_TO_THERAPY:: SHOULDER
PUSHING_WITH_THE_INVOLVED_ARM: 3
PLACING_AN_OBJECT_ON_A_HIGH_SHELF: 0
PUTTING_ON_AN_UNDERSHIRT_OR_A_PULLOVER_SWEATER: 0
PUTTING_ON_YOUR_PANTS: 0
TOUCHING_THE_BACK_OF_YOUR_NECK: 0
WHEN_LYING_ON_THE_INVOLVED_SIDE: 2
REACHING_FOR_SOMETHING_ON_A_HIGH_SHELF: 2
WASHING_YOUR_BACK: 0
PUTTING_ON_A_SHIRT_THAT_BUTTONS_DOWN_THE_FRONT: 0
WASHING_YOUR_HAIR?: 0
REMOVING_SOMETHING_FROM_YOUR_BACK_POCKET: 0
CARRYING_A_HEAVY_OBJECT_OF_10_POUNDS: 0
AT_ITS_WORST?: 2

## 2025-02-12 ENCOUNTER — THERAPY VISIT (OUTPATIENT)
Dept: PHYSICAL THERAPY | Facility: REHABILITATION | Age: 75
End: 2025-02-12
Attending: INTERNAL MEDICINE
Payer: MEDICARE

## 2025-02-12 DIAGNOSIS — M79.2 RADICULAR PAIN OF RIGHT UPPER EXTREMITY: ICD-10-CM

## 2025-02-12 DIAGNOSIS — G89.29 CHRONIC RIGHT SHOULDER PAIN: ICD-10-CM

## 2025-02-12 DIAGNOSIS — M25.511 ACUTE PAIN OF RIGHT SHOULDER: Primary | ICD-10-CM

## 2025-02-12 DIAGNOSIS — R29.898 WEAKNESS OF RIGHT SHOULDER: ICD-10-CM

## 2025-02-12 DIAGNOSIS — M25.511 CHRONIC RIGHT SHOULDER PAIN: ICD-10-CM

## 2025-02-12 PROCEDURE — 97110 THERAPEUTIC EXERCISES: CPT | Mod: GP | Performed by: PHYSICAL THERAPIST

## 2025-02-12 PROCEDURE — 97161 PT EVAL LOW COMPLEX 20 MIN: CPT | Mod: GP | Performed by: PHYSICAL THERAPIST

## 2025-02-12 NOTE — PROGRESS NOTES
PHYSICAL THERAPY EVALUATION  Type of Visit: Evaluation       Fall Risk Screen:  Fall screen completed by: PT  Have you fallen 2 or more times in the past year?: No  Have you fallen and had an injury in the past year?: No  Is patient a fall risk?: No    Subjective         Presenting condition or subjective complaint: Pt reports R shoulder pain is dull and aching is on/off and in mid December the pain seemed to really increased.  The pain is in the shoulder and does radiate into the arm and even into the neck.  Pt reports that she has increased pain with driving - hands on the steering wheel.  Pain with sleeping on the R side. She has pain with reaching overhead behind her back.  She can perform her ADL's with no issues.  She exercises often and does a weight lifting class 3x/week that involved lifting weights - some pain overhead and too much pain she will lower the weight. She did a course of NSAIDS during her first week of pain and she was unsure if this was helpful.   She does have some increase in finger numbness and tingling.  She does have a h/o cervical surgery due to disc issues and also h/o bone spur with removal.  Date of onset: 12/01/23    Relevant medical history: Arthritis; Cancer; Diabetes; Foot drop; Heart problems; High blood pressure; Osteoarthritis   Dates & types of surgery: many    Prior diagnostic imaging/testing results: X-ray     Prior therapy history for the same diagnosis, illness or injury: No      Prior Level of Function  Transfers: Independent  Ambulation: Independent  ADL: Independent    Living Environment  Social support: Alone   Type of home: House   Stairs to enter the home: Yes 2 Is there a railing: No     Ramp: No   Stairs inside the home: Yes 16 Is there a railing: Yes     Help at home: None  Equipment owned:       Employment: No    Hobbies/Interests: hiking, backpacking, political activism, reading    Patient goals for therapy: reduce the ache in my right arm    Pain assessment:  Pain present     Objective   SHOULDER EVALUATION  PAIN: Pain Quality: Aching and Dull    INTEGUMENTARY (edema, incisions): WNL    POSTURE:  Wnl - fair - slight fwd shoulders     ROM:   Shoulder and Elbow ROM ( )   AROM in degrees    Left Right   Shoulder Flexion (0-180 ) WNL WNL, pain in shlrd   Shoulder Abduction (0-180 ) WNL WNL, clicking   Shoulder Extension (0-60 )     Shoulder ER (0-90 ) WNL WNL, R shlr pain    Shoulder IR (0-70 ) T7 T7, some shldr pain   Shoulder IR/EXT     Elbow Flexion (150 )     Elbow Extension (0 )      PROM in degrees    Left Right   Shoulder Flexion (0-180 )     Shoulder Abduction (0-180 )     Shoulder Extension (0-60 )     Shoulder ER (0-90 )     Shoulder IR (0-70 )     Elbow Flexion (150 )     Elbow Extension (0 )         STRENGTH:   Left Right   Shoulder Flexion 5 4   Shoulder Extension     Shoulder Abduction 5 4   Shoulder Adduction     Shoulder Internal Rotation 5 4   Shoulder External Rotation 5 4   Elbow Flexion 5 4   Elbow Extension 5 4   Mid Trap     Lower Trap     Rhomboid     Serratus Anterior       FLEXIBILITY: WFL    DERMATOMES:  min numbness in R 2nd finder   SPECIAL TESTS:   Impingement Cluster Left (+/-) Right (+/-)   Nino-Mukul  +   Painful Arc  +   Infraspinatus Test     Neer's Test  +   AC Tests Left (+/-) Right (+/-)   Active Compression     Crossbody Adduction  +   AC Resisted Extension     GH Instability Tests Left (+/-) Right (+/-)   Sulcus Sign  -   Apprehension  -   Relocation     Load and Shift  -   Other:     Rotator Cuff Tests Left (+/-) Right (+/-)   Drop Arm sign  -   Hornblower's  -   ER Lag Sign  -   IR Lag Sign  -   Empty Can     Belly Press     Lift Off     Labral Tests Left (+/-) Right (+/-)   Biceps Load Test II     Jerk Test     Veronica Test     Clunk Test     Crank Test     Marathon's  -   Other:     Biceps Tests Left (+/-) Right (+/-)   Speed  +   Yergason's  +   Other:     Other:     ULTT Left (+/-) Right (+/-)   Median - +   Ulnar - -   Radial - +    **L side all negative     PALPATION:  increased tenderness over the R biceps/anterior shoulder and into the muscles all the way into the forearm     JOINT MOBILITY:  min dec in GH inf and post glide     CERVICAL SCREEN:  flexion - no loss, no sx change; extension = mod loss, increased R UE sx; R SB = min loss, no change in sx; L SB = min loss, increase R UE sx; L Rot = no loss, no change in sx; R Rot = min loss; R UE sx to shoulder         Assessment & Plan   CLINICAL IMPRESSIONS  Medical Diagnosis: Chronic right shoulder pain    Treatment Diagnosis: R shoulder pain and weakness with radicular sx   Impression/Assessment:  Pt presents to PT with R shoulder pain that also has some characteristics of R UE radiculopathy.  The pt has full R shoulder ROM, but with some pain at end ranges.  She  has decreased R shoulder strength in all shoulder/RC motions.  She has positive impingement and biceps special tests.  However, she also has minimal cervical involvement with increased radicular sx into her R UE including hand with cervical extension and L SB.  She also has positive nerve tension tests of the medial and radial nerves.  She will benefit from PT to address these impairments and return to per prior level of function.     Clinical Decision Making (Complexity):  Clinical Presentation: Stable/Uncomplicated  Clinical Presentation Rationale: based on medical and personal factors listed in PT evaluation  Clinical Decision Making (Complexity): Low complexity    PLAN OF CARE  Treatment Interventions:  Modalities: Cryotherapy, E-stim, Hot Pack  Interventions: Manual Therapy, Neuromuscular Re-education, Therapeutic Activity, Therapeutic Exercise, Self-Care/Home Management    Long Term Goals     PT Goal 1  Goal Identifier: [place]  Goal Description: Pt will demonstrate readiness for independent sx management and HEP  Rationale: to maximize safety and independence within the home  Target Date: 05/12/25  PT Goal 2  Goal  Identifier: Sleeping on R shoulder  Goal Description: Pt will able to sleep on her R shoulder with pain </=4/10 for 50% of the night  Rationale: to maximize safety and independence with performance of ADLs and functional tasks  Target Date: 05/12/25  PT Goal 3  Goal Identifier: Exercising  Goal Description: Pt will be able to exercise including overhead lifting with shoulder pain </=4/10  Rationale: to maximize safety and independence with performance of ADLs and functional tasks  Target Date: 05/12/25      Frequency of Treatment: 1x/week  Duration of Treatment: 90 days    Recommended Referrals to Other Professionals:  None  Education Assessment:   Learner/Method: Patient;Demonstration;Pictures/Video  Education Comments: Pt educated on POC, pathology, etc    Risks and benefits of evaluation/treatment have been explained.   Patient/Family/caregiver agrees with Plan of Care.     Evaluation Time:     PT Eval, Low Complexity Minutes (41537): 20   Signing Clinician: Argentina Robb PT        [facility]                                                                                   OUTPATIENT PHYSICAL THERAPY      PLAN OF TREATMENT FOR OUTPATIENT REHABILITATION   Patient's Last Name, First Name, Sammi Brock YOB: 1950   Provider's Name   [facility]   Medical Record No.  [id number]     Onset Date: 12/01/23  Start of Care Date: 02/12/25     Medical Diagnosis:  Chronic right shoulder pain      PT Treatment Diagnosis:  R shoulder pain and weakness with radicular sx Plan of Treatment  Frequency/Duration: 1x/week/ 90 days    Certification date from 02/12/25 to 05/12/25         See note for plan of treatment details and functional goals     Argentina Robb PT                         I CERTIFY THE NEED FOR THESE SERVICES FURNISHED UNDER        THIS PLAN OF TREATMENT AND WHILE UNDER MY CARE     (Physician attestation of this document  indicates review and certification of the therapy plan).              Referring Provider:  Jeana Al    Initial Assessment  See Epic Evaluation- Start of Care Date: 02/12/25

## 2025-03-03 ENCOUNTER — THERAPY VISIT (OUTPATIENT)
Dept: PHYSICAL THERAPY | Facility: REHABILITATION | Age: 75
End: 2025-03-03
Payer: MEDICARE

## 2025-03-03 DIAGNOSIS — M25.511 ACUTE PAIN OF RIGHT SHOULDER: Primary | ICD-10-CM

## 2025-03-03 DIAGNOSIS — R29.898 WEAKNESS OF RIGHT SHOULDER: ICD-10-CM

## 2025-03-03 DIAGNOSIS — M79.2 RADICULAR PAIN OF RIGHT UPPER EXTREMITY: ICD-10-CM

## 2025-03-11 ENCOUNTER — THERAPY VISIT (OUTPATIENT)
Dept: PHYSICAL THERAPY | Facility: REHABILITATION | Age: 75
End: 2025-03-11
Payer: MEDICARE

## 2025-03-11 DIAGNOSIS — M25.511 ACUTE PAIN OF RIGHT SHOULDER: Primary | ICD-10-CM

## 2025-03-11 DIAGNOSIS — M79.2 RADICULAR PAIN OF RIGHT UPPER EXTREMITY: ICD-10-CM

## 2025-03-11 DIAGNOSIS — R29.898 WEAKNESS OF RIGHT SHOULDER: ICD-10-CM

## 2025-03-18 ENCOUNTER — THERAPY VISIT (OUTPATIENT)
Dept: PHYSICAL THERAPY | Facility: REHABILITATION | Age: 75
End: 2025-03-18
Payer: MEDICARE

## 2025-03-18 DIAGNOSIS — M79.2 RADICULAR PAIN OF RIGHT UPPER EXTREMITY: ICD-10-CM

## 2025-03-18 DIAGNOSIS — M25.511 ACUTE PAIN OF RIGHT SHOULDER: Primary | ICD-10-CM

## 2025-03-18 DIAGNOSIS — R29.898 WEAKNESS OF RIGHT SHOULDER: ICD-10-CM

## 2025-03-18 PROCEDURE — 97110 THERAPEUTIC EXERCISES: CPT | Mod: GP | Performed by: PHYSICAL THERAPIST

## 2025-03-18 PROCEDURE — 97140 MANUAL THERAPY 1/> REGIONS: CPT | Mod: GP | Performed by: PHYSICAL THERAPIST

## 2025-03-21 ENCOUNTER — TELEPHONE (OUTPATIENT)
Dept: INTERNAL MEDICINE | Facility: CLINIC | Age: 75
End: 2025-03-21
Payer: MEDICARE

## 2025-03-21 DIAGNOSIS — Z12.11 ENCOUNTER FOR SCREENING COLONOSCOPY: Primary | ICD-10-CM

## 2025-03-21 NOTE — TELEPHONE ENCOUNTER
Order/Referral Request    Who is requesting: pt    Orders being requested: colonoscopy    Reason service is needed/diagnosis: 10 year anniversary and received text stating she was due. PCP had discussed this at last appt and told pt to call and get a referral when she was due    When are orders needed by: asap    Has this been discussed with Provider: Yes    Does patient have a preference on a Group/Provider/Facility? [facility]    Does patient have an appointment scheduled?: No    Where to send orders: Place orders within Epic    Could we send this information to you in [employer] or would you prefer to receive a phone call?:   Patient would like to be contacted via [employer]

## 2025-03-25 NOTE — TELEPHONE ENCOUNTER
If she received a notice she can call and schedule on her own.  Does not need a referral from us.  The insurance referral, if necessary, can come from Dr. Al once she has returned so that she receives the results etc.

## 2025-03-25 NOTE — TELEPHONE ENCOUNTER
Covering provider pleases review message/referral request and advise.     Per chart review, last colonoscopy completed with [facility] on 4/20/15 which indicates to recheck x 10 years (Due 4/2025).     Order pended to review and sign if agreeable, thank you.

## 2025-03-26 ENCOUNTER — THERAPY VISIT (OUTPATIENT)
Dept: PHYSICAL THERAPY | Facility: REHABILITATION | Age: 75
End: 2025-03-26
Payer: MEDICARE

## 2025-03-26 DIAGNOSIS — M25.511 ACUTE PAIN OF RIGHT SHOULDER: Primary | ICD-10-CM

## 2025-03-26 DIAGNOSIS — M79.2 RADICULAR PAIN OF RIGHT UPPER EXTREMITY: ICD-10-CM

## 2025-03-26 DIAGNOSIS — R29.898 WEAKNESS OF RIGHT SHOULDER: ICD-10-CM

## 2025-03-26 PROCEDURE — 97110 THERAPEUTIC EXERCISES: CPT | Mod: GP | Performed by: PHYSICAL THERAPIST

## 2025-03-26 PROCEDURE — 97140 MANUAL THERAPY 1/> REGIONS: CPT | Mod: GP | Performed by: PHYSICAL THERAPIST

## 2025-03-26 NOTE — TELEPHONE ENCOUNTER
Call return to patient to discuss information below. Patient reports that she has not received any correspondence from [facility] however will wiat for their notification letter.     Advised patient she can also contact her insurance to ensure if a referral is needed. If a referral is needed, patient can let us know via call/mychart and we can have PCP place this order. Patient offers no further concerns.

## 2025-04-03 ENCOUNTER — OFFICE VISIT (OUTPATIENT)
Dept: URGENT CARE | Facility: URGENT CARE | Age: 75
End: 2025-04-03
Payer: MEDICARE

## 2025-04-03 VITALS
RESPIRATION RATE: 16 BRPM | BODY MASS INDEX: 18.07 KG/M2 | OXYGEN SATURATION: 100 % | TEMPERATURE: 98.6 F | DIASTOLIC BLOOD PRESSURE: 81 MMHG | WEIGHT: 102 LBS | SYSTOLIC BLOOD PRESSURE: 138 MMHG | HEART RATE: 79 BPM

## 2025-04-03 DIAGNOSIS — B34.9 VIRAL SYNDROME: Primary | ICD-10-CM

## 2025-04-03 DIAGNOSIS — R52 BODY ACHES: ICD-10-CM

## 2025-04-03 DIAGNOSIS — J02.9 SORE THROAT: ICD-10-CM

## 2025-04-03 LAB
DEPRECATED S PYO AG THROAT QL EIA: NEGATIVE
FLUAV AG SPEC QL IA: NEGATIVE
FLUBV AG SPEC QL IA: NEGATIVE
S PYO DNA THROAT QL NAA+PROBE: NOT DETECTED

## 2025-04-03 RX ORDER — ALBUTEROL SULFATE 90 UG/1
2 INHALANT RESPIRATORY (INHALATION) EVERY 6 HOURS
Qty: 18 G | Refills: 0 | Status: SHIPPED | OUTPATIENT
Start: 2025-04-03 | End: 2025-05-03

## 2025-04-03 RX ORDER — BENZONATATE 100 MG/1
100 CAPSULE ORAL 3 TIMES DAILY PRN
Qty: 30 CAPSULE | Refills: 0 | Status: SHIPPED | OUTPATIENT
Start: 2025-04-03 | End: 2025-04-13

## 2025-04-03 NOTE — PROGRESS NOTES
Patient presents with:  Throat Problem: Tired achy and runny nose had a sore throat at the beginning of the week. Neg home covid test at beginning of week.       Clinical Decision Making:  Strep test was obtained and was negative.  Culture is to follow.  Influenza testing is negative.  COVID-19 screening test is pending.  Symptomatic care was gone over. Expected course of resolution and indication for return was gone over and questions were answered to patient/parent's satisfaction before discharge.          ICD-10-CM    1. Viral syndrome  B34.9 albuterol (PROAIR HFA/PROVENTIL HFA/VENTOLIN HFA) 108 (90 Base) MCG/ACT inhaler     benzonatate (TESSALON) 100 MG capsule      2. Sore throat  J02.9 Streptococcus A Rapid Screen w/Reflex to PCR - Clinic Collect     Group A Streptococcus PCR Throat Swab      3. Body aches  R52 Influenza A & B Antigen - Clinic Collect          Patient Instructions   You were seen today for acute bronchitis. This is likely due to a viral illness.    Symptom management:  - Get plenty of rest  - Avoid smoking and second hand smoke  - May take tylenol or ibuprofen for fever/discomfort  - Drink plenty of non-caffeinated fluids  - Use nasal steroid spray for sinus congestion  - Albuterol inhaler may be used every 6 hours as needed for chest tightness      Reasons to be seen in the emergency room:  - Develop a fever of 100.4 or higher  - Cough changes, coughing up blood, or become short of breath  - Neck stiffness  - Chest pain  - Severe headache  - Unable to tolerate eating or drinking fluids    Otherwise, if no symptom improvement after 5 days, follow-up with your primary care provider.        HPI:  Sammi Guadalupe is a 74 year old female who presents today for 4-day history of sore throat odynophagia myalgias anorexia fever chills night sweats and fatigue.  Currently patient is denying headache, cough, loss of taste or smell shortness of breath pleuritic chest pain syncope or presyncope or skin  rash or abdominal pain.  No reported sick contacts at home.  Patient does have sick contacts at work.  COVID testing was performed at home 4 days ago which was returned as negative.  Last dose of ibuprofen was last night.    History obtained from chart review and the patient.    Problem List:  2025-02: Acute pain of right shoulder  2025-02: Weakness of right shoulder  2025-02: Radicular pain of right upper extremity  2024-10: Anal sphincter incontinence  2023-06: Essential hypertension  2022-10: Hypertension  2022-10: Neuropathy  2022-10: Vitamin D insufficiency  2022-06: Protein-calorie malnutrition  2022-06: History of DVT (deep vein thrombosis)  2021-04: Osteopenia of multiple sites  2021-01: Health maintenance examination  2020-03: Type 1 diabetes mellitus with complication (H)  2019-03: Insulin pump in place  2018-11: Adverse effect of other drugs, medicaments and biological   substances, sequela  2018-09: Diabetes mellitus type I, controlled (H)  2018-09: S/P coronary artery stent placement  2018-07: Age-related osteoporosis without current pathological   fracture  2017-11: Malignant neoplasm of breast (H)  2017-09: 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), managed as type   1 (H)  2016-10: Mixed hyperlipidemia  2016-02: Controlled type 1 diabetes mellitus without complication (H)  2015-12: Diabetes mellitus type 1 (H)  2014-11: Swelling of arm  2014-11: DVT (deep venous thrombosis) (H)  2014-05: Malignant neoplasm of lower-outer quadrant of female breast   (H)  2012-12: Cervical radiculopathy  2012-12: Hyperlipidemia  2012-12: Arm pain  2012-12: Numbness  2012-12: Fatigue, unspecified type  2012-10: Lumbar disc herniation  2009-01: CTS (carpal tunnel syndrome)  Hypertension  ASHD (arteriosclerotic heart disease)      Past Medical History:   Diagnosis Date    ASHD (arteriosclerotic heart disease) 2012    CT scan, calcium score 428 lad artery deployment of a non-drug-eluting stent in the LAD    DVT (deep  venous thrombosis) (H)     Family history of myocardial infarction        Social History     Tobacco Use    Smoking status: Former     Current packs/day: 0.00     Types: Cigarettes     Quit date: 1985     Years since quittin.5     Passive exposure: Past    Smokeless tobacco: Never   Substance Use Topics    Alcohol use: Yes     Alcohol/week: 5.0 standard drinks of alcohol       Review of Systems  As above in HPI otherwise negative.    Vitals:    25 1013 25 1019   BP: (!) 146/84 138/81   Pulse: 79    Resp: 16    Temp: 98.6  F (37  C)    SpO2: 100%    Weight: 46.3 kg (102 lb)        General: Patient is resting comfortably no acute distress is afebrile  HEENT: Head is normocephalic atraumatic   eyes are PERRL EOMI sclera anicteric   TMs are clear bilaterally  Throat is with mild pharyngeal wall erythema and no exudate  No cervical lymphadenopathy present  LUNGS: Clear to auscultation bilaterally  HEART: Regular rate and rhythm  Skin: Without rash non-diaphoretic    Physical Exam      Labs:  Results for orders placed or performed in visit on 25   Streptococcus A Rapid Screen w/Reflex to PCR - Clinic Collect     Status: Normal    Specimen: Throat; Swab   Result Value Ref Range    Group A Strep antigen Negative Negative   Influenza A & B Antigen - Clinic Collect     Status: Normal    Specimen: Nose; Swab   Result Value Ref Range    Influenza A antigen Negative Negative    Influenza B antigen Negative Negative    Narrative    Test results must be correlated with clinical data. If necessary, results should be confirmed by a molecular assay or viral culture.       At the end of the encounter, I discussed results, diagnosis, medications. Discussed red flags for immediate return to clinic/ER, as well as indications for follow up if no improvement. Patient understood and agreed to plan. Patient was stable for discharge.

## 2025-04-07 ENCOUNTER — TRANSFERRED RECORDS (OUTPATIENT)
Dept: HEALTH INFORMATION MANAGEMENT | Facility: CLINIC | Age: 75
End: 2025-04-07
Payer: MEDICARE

## 2025-04-14 ENCOUNTER — THERAPY VISIT (OUTPATIENT)
Dept: PHYSICAL THERAPY | Facility: REHABILITATION | Age: 75
End: 2025-04-14
Payer: MEDICARE

## 2025-04-14 DIAGNOSIS — M79.2 RADICULAR PAIN OF RIGHT UPPER EXTREMITY: ICD-10-CM

## 2025-04-14 DIAGNOSIS — M25.511 ACUTE PAIN OF RIGHT SHOULDER: Primary | ICD-10-CM

## 2025-04-14 DIAGNOSIS — R29.898 WEAKNESS OF RIGHT SHOULDER: ICD-10-CM

## 2025-04-14 PROCEDURE — 97110 THERAPEUTIC EXERCISES: CPT | Mod: GP | Performed by: PHYSICAL THERAPIST

## 2025-04-20 ENCOUNTER — HEALTH MAINTENANCE LETTER (OUTPATIENT)
Age: 75
End: 2025-04-20

## 2025-05-09 ENCOUNTER — TRANSFERRED RECORDS (OUTPATIENT)
Dept: HEALTH INFORMATION MANAGEMENT | Facility: CLINIC | Age: 75
End: 2025-05-09
Payer: MEDICARE

## 2025-06-02 ENCOUNTER — ANCILLARY PROCEDURE (OUTPATIENT)
Dept: GENERAL RADIOLOGY | Facility: CLINIC | Age: 75
End: 2025-06-02
Attending: PHYSICIAN ASSISTANT
Payer: MEDICARE

## 2025-06-02 ENCOUNTER — OFFICE VISIT (OUTPATIENT)
Dept: URGENT CARE | Facility: URGENT CARE | Age: 75
End: 2025-06-02
Payer: MEDICARE

## 2025-06-02 VITALS
DIASTOLIC BLOOD PRESSURE: 80 MMHG | HEART RATE: 75 BPM | RESPIRATION RATE: 16 BRPM | OXYGEN SATURATION: 99 % | BODY MASS INDEX: 17.72 KG/M2 | HEIGHT: 63 IN | SYSTOLIC BLOOD PRESSURE: 134 MMHG | WEIGHT: 100 LBS | TEMPERATURE: 98.1 F

## 2025-06-02 DIAGNOSIS — S69.92XA HAND INJURY, LEFT, INITIAL ENCOUNTER: ICD-10-CM

## 2025-06-02 DIAGNOSIS — M89.9 BONE LESION: ICD-10-CM

## 2025-06-02 DIAGNOSIS — M84.442S: Primary | ICD-10-CM

## 2025-06-02 PROCEDURE — 3075F SYST BP GE 130 - 139MM HG: CPT | Performed by: PHYSICIAN ASSISTANT

## 2025-06-02 PROCEDURE — 3079F DIAST BP 80-89 MM HG: CPT | Performed by: PHYSICIAN ASSISTANT

## 2025-06-02 PROCEDURE — 73130 X-RAY EXAM OF HAND: CPT | Mod: TC | Performed by: RADIOLOGY

## 2025-06-02 PROCEDURE — 29125 APPL SHORT ARM SPLINT STATIC: CPT | Performed by: PHYSICIAN ASSISTANT

## 2025-06-02 PROCEDURE — 99214 OFFICE O/P EST MOD 30 MIN: CPT | Mod: 25 | Performed by: PHYSICIAN ASSISTANT

## 2025-06-02 NOTE — PROGRESS NOTES
Urgent Care Clinic Visit    Chief Complaint   Patient presents with    Hand Injury     Fell on May 25th left pinky finger painful after fall

## 2025-06-02 NOTE — PATIENT INSTRUCTIONS
Follow up with Ortho in the next 2 weeks.   Continue to keep the splint as clean and dry as possible.   Take Tylenol and/or Ibuprofen as needed for pain, but the splint should give some protection from knocking it against things.

## 2025-06-02 NOTE — PROGRESS NOTES
[facility]  [address] [place] [id number]  Phone: [phone]  Fax: [phone]    Patient:  Sammi Guadalupe, Date of birth 1950  Date of Visit:  06/02/2025  Referring Provider No ref. provider found    Patient presents with:  Hand Injury: Fell on May 25th left pinky finger painful after fall         ICD-10-CM    1. Pathological fracture, left hand, sequela  M84.442S Orthopedic  Referral      2. Hand injury, left, initial encounter  S69.92XA XR Hand Left G/E 3 Views      3. Bone lesion  M89.9           Patient Instructions   Follow up with Ortho in the next 2 weeks.   Continue to keep the splint as clean and dry as possible.   Take Tylenol and/or Ibuprofen as needed for pain, but the splint should give some protection from knocking it against things.     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  - Possibility of a more aggressive lesion, requiring close monitoring.  - Presence of arthritic changes, particularly osteoarthritis at the base of the thumb.    Plan  - Perform a whole hand X-ray to assess the injury.  - Splint the hand to immobilize the fracture and allow healing.  - Refer to orthopedics for fracture management.  - Follow up with either sports medicine or orthopedics in the next couple of weeks.  - Monitor the cyst-like lesion with repeat X-rays to ensure no sudden changes.  - Discuss the condition with primary care provider for further management.    Prescription  - Ibuprofen    Appointments  - Referral to orthopedics for fracture management, follow-up within the next couple of weeks.  - Follow-up with primary care provider to discuss bone lesion and potential further evaluation.           History of Present Illness     Pertinent history obtain from: patient    Sammi Guadalupe, a 74-year-old female, reports falling approximately 8 days ago while  hiking on the [place] in [place]. During the fall, she injured her finger by landing on a rock while holding hiking poles. She did not reach out her hand to catch herself. Since the fall, she has experienced significant pain and swelling in the injured finger, which has persisted despite farzad taping for support. She returned from her trip last night and noted increased soreness today. She denied any history of needing to avoid NSAIDs, and ibuprofen works well for her.    Problem List:  2025-02: Acute pain of right shoulder  2025-02: Weakness of right shoulder  2025-02: Radicular pain of right upper extremity  2024-10: Anal sphincter incontinence  2023-06: Essential hypertension  2022-10: Hypertension  2022-10: Neuropathy  2022-10: Vitamin D insufficiency  2022-06: Protein-calorie malnutrition  2022-06: History of DVT (deep vein thrombosis)  2021-04: Osteopenia of multiple sites  2021-01: Health maintenance examination  2020-03: Type 1 diabetes mellitus with complication (H)  2019-03: Insulin pump in place  2018-11: Adverse effect of other drugs, medicaments and biological   substances, sequela  2018-09: Diabetes mellitus type I, controlled (H)  2018-09: S/P coronary artery stent placement  2018-07: Age-related osteoporosis without current pathological   fracture  2017-11: Malignant neoplasm of breast (H)  2017-09: 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), managed as type   1 (H)  2016-10: Mixed hyperlipidemia  2016-02: Controlled type 1 diabetes mellitus without complication (H)  2015-12: Diabetes mellitus type 1 (H)  2014-11: Swelling of arm  2014-11: DVT (deep venous thrombosis) (H)  2014-05: Malignant neoplasm of lower-outer quadrant of female breast   (H)  2012-12: Cervical radiculopathy  2012-12: Hyperlipidemia  2012-12: Arm pain  2012-12: Numbness  2012-12: Fatigue, unspecified type  2012-10: Lumbar disc herniation  2009-01: CTS (carpal tunnel syndrome)  Hypertension  ASHD (arteriosclerotic heart  "disease)      Past Medical History:   Diagnosis Date    ASHD (arteriosclerotic heart disease)     CT scan, calcium score 428 lad artery deployment of a non-drug-eluting stent in the LAD    DVT (deep venous thrombosis) (H)     Family history of myocardial infarction        Social History     Tobacco Use    Smoking status: Former     Current packs/day: 0.00     Types: Cigarettes     Quit date: 1985     Years since quittin.7     Passive exposure: Past    Smokeless tobacco: Never   Substance Use Topics    Alcohol use: Yes     Alcohol/week: 5.0 standard drinks of alcohol       Physical Exam     Physical Exam  Vitals and nursing note reviewed.   Constitutional:       General: She is not in acute distress.     Appearance: She is not toxic-appearing or diaphoretic.   HENT:      Head: Normocephalic and atraumatic.   Eyes:      Conjunctiva/sclera: Conjunctivae normal.   Pulmonary:      Effort: Pulmonary effort is normal.   Musculoskeletal:        Hands:    Neurological:      Mental Status: She is alert.   Psychiatric:         Mood and Affect: Mood normal.         Behavior: Behavior normal.         Thought Content: Thought content normal.         Judgment: Judgment normal.         Vital signs:  /80   Pulse 75   Temp 98.1  F (36.7  C) (Oral)   Resp 16   Ht 1.6 m (5' 3\")   Wt 45.4 kg (100 lb)   LMP  (LMP Unknown)   SpO2 99%   BMI 17.71 kg/m        Data   Laboratory data and imaging listed below were reviewed as part of this encounter.     Results for orders placed or performed in visit on 25   XR Hand Left G/E 3 Views     Status: None    Narrative    EXAM: XR HAND LEFT G/E 3 VIEWS  LOCATION: [facility]  DATE: 2025    INDICATION: Left hand injury (fall while hiking). Pain at dorsal second, third, and fifth MCP joints. Mild swelling. No severe deformity.  COMPARISON: None.      Impression    IMPRESSION:   1. There is an 8 mm cystic lucency at the base of the proximal " phalanx of the pinky finger, and there is a mildly displaced comminuted intra-articular pathologic fracture through this lesion. The lesion is nonspecific, but at least a portion of the   lesion has a thin sclerotic margin, so it may be a benign structure such as an enchondroma or a degenerative subchondral cyst/intraosseous ganglion. Follow-up pinky finger radiographs recommended to ensure healing and to exclude a more aggressive   underlying lesion.  2. Mild osteoarthrosis of the STT joint. Moderate to severe osteoarthrosis of the first CMC joint. Mild to moderate osteoarthrosis of the MCP and interphalangeal joints.   3. Hypertrophic changes adjacent to the PIP joint of the index finger from old trauma.     PROCEDURE: Left Ulnar Gutter Splint placement   SITE: Left hand   INDICATIONS: Proximal phalanx fracture.    CONSENT:  Risks, benefits and alternatives were discussed with patient and consent for procedure was obtained.           NOTE: Stockinette was placed in smooth following by cotton wrap for cushion.  3 inch Ortho-Glass placed in ulnar gutter positioning with angulation of the 5th and 4th digit.  Ortho-Glass was attached via ace wrap.  Confirmed patient's comfort following splint placement.  Neurovascularly status was checked after placement and intact.       COMPLICATIONS:  None              Consent was obtained from the patient to use an AI documentation tool in the creation of  this note

## 2025-06-03 ENCOUNTER — PATIENT OUTREACH (OUTPATIENT)
Dept: CARE COORDINATION | Facility: CLINIC | Age: 75
End: 2025-06-03
Payer: MEDICARE

## 2025-06-04 ENCOUNTER — TELEPHONE (OUTPATIENT)
Dept: ORTHOPEDICS | Facility: CLINIC | Age: 75
End: 2025-06-04
Payer: MEDICARE

## 2025-06-04 NOTE — TELEPHONE ENCOUNTER
Other: Patient needs an orthopedic oncology appointment due to Pathological fracture, left hand, sequela. XRAYS on 06/02/25. Sending TE to get patient scheduled.        Could we send this information to you in [email or web address] or would you prefer to receive a phone call?:   Patient would prefer a phone call   Okay to leave a detailed message?: Yes at Cell number on file:    Telephone Information:   Mobile [phone]

## 2025-06-05 ENCOUNTER — PATIENT OUTREACH (OUTPATIENT)
Dept: CARE COORDINATION | Facility: CLINIC | Age: 75
End: 2025-06-05
Payer: MEDICARE

## 2025-06-05 NOTE — TELEPHONE ENCOUNTER
DIAGNOSIS: Pathological fracture, left hand, sequela [M84.442S]  - Primary   APPOINTMENT DATE: 06/11/2025   NOTES STATUS DETAILS   OFFICE NOTE from referring provider Internal 6/2/2025  [facility]   XRAYS (IMAGES & REPORTS) Internal 6/2/2025  Left Hand

## 2025-06-05 NOTE — TELEPHONE ENCOUNTER
Patient states she does have a splint. Appointment was scheduled for next Wednesday with Dr. Joya.     Yesenia Noonan LPN

## 2025-06-05 NOTE — TELEPHONE ENCOUNTER
Other: Call back request     Sammi is calling back regarding the appt that is needed for her hand consult.     Please contact as soon as possible to number listed    Could we send this information to you in GoLark or would you prefer to receive a phone call?:   Patient would prefer a phone call   Okay to leave a detailed message?: Yes at Cell number on file:    Telephone Information:   Mobile [phone]

## 2025-06-11 ENCOUNTER — OFFICE VISIT (OUTPATIENT)
Dept: ORTHOPEDICS | Facility: CLINIC | Age: 75
End: 2025-06-11
Payer: MEDICARE

## 2025-06-11 ENCOUNTER — PRE VISIT (OUTPATIENT)
Dept: ORTHOPEDICS | Facility: CLINIC | Age: 75
End: 2025-06-11

## 2025-06-11 VITALS — WEIGHT: 100.6 LBS | HEIGHT: 63 IN | BODY MASS INDEX: 17.82 KG/M2

## 2025-06-11 DIAGNOSIS — M84.442S: ICD-10-CM

## 2025-06-11 PROCEDURE — 99203 OFFICE O/P NEW LOW 30 MIN: CPT | Mod: GC | Performed by: ORTHOPAEDIC SURGERY

## 2025-06-11 NOTE — NURSING NOTE
"Reason For Visit:   Chief Complaint   Patient presents with    Consult     Pathological fracture - left hand referral from Marie Johnson PA-C        Ht 1.588 m (5' 2.5\")   Wt 45.6 kg (100 lb 9.6 oz)   LMP  (LMP Unknown)   BMI 18.11 kg/m      Pain Assessment  Patient Currently in Pain: Yes  Primary Pain Location: Hand (Left hand - pain level - non existent unless patient has to move it.)    Keyanna Preciado CMA    "

## 2025-06-11 NOTE — LETTER
6/11/2025      Sammi Guadalupe  [address] [place] [address]      Dear Colleague,    Thank you for referring your patient, Sammi Guadalupe, to the [facility] [place] [facility] [place]. Please see a copy of my visit note below.    ORTHOPEDIC SURGERY CLINIC NOTE    DATE OF INJURY: ~5/25/25  INJURY: left small finger fracture through bone cyst  MECHANISM: fall while hiking [place] in [place]     DATE OF VISIT: 06/11/25     HISTORY OF PRESENT ILLNESS  Sammi Guadalupe is a right hand dominant 74 year old female who presents for an evaluation of her left small finger.  Per above, she fell hiking recently and had pain but was able to finish her hiking and trip.  She was seen at an urgent care about a week ago where she had x-rays and the fracture was diagnosed.  She was placed in an ulnar gutter splint.  She does not have a history of other fractures but she does notably have history of breast cancer diagnosed in 2013 and completed 7 years of adjuvant hormonal therapy at end of April 2021.    PAST MEDICAL HISTORY  Past Medical History:   Diagnosis Date     ASHD (arteriosclerotic heart disease) 2012    CT scan, calcium score 428 lad artery deployment of a non-drug-eluting stent in the LAD     DVT (deep venous thrombosis) (H)      Family history of myocardial infarction        PAST SURGICAL HISTORY  Past Surgical History:   Procedure Laterality Date     BACK SURGERY       IR MISCELLANEOUS PROCEDURE  1/27/2014     IR PORT REMOVAL  2/6/2015     LAMINECTOMY AND MICRODISCECTOMY CERVICAL SPINE N/A      LAPAROSCOPIC HYSTERECTOMY N/A 12/12/2016    Procedure: ROBOTIC TOTAL LAPAROSCOPIC HYSTERECTOMY, BILATERAL SALPINGO-OOPHORECTOMY, CYSTOSCOPY;  Surgeon: Sohan Ramirez MD;  Location: [facility] OR;  Service:      MASTECTOMY Bilateral      RELEASE CARPAL TUNNEL Bilateral        MEDICATIONS  Current Outpatient Medications   Medication Sig Dispense Refill     aspirin 81 MG tablet [ASPIRIN 81 MG  TABLET] Take 81 mg by mouth daily.       atorvastatin (LIPITOR) 40 MG tablet Take 1 tablet (40 mg) by mouth at bedtime. 90 tablet 3     b complex vitamins tablet [B COMPLEX VITAMINS TABLET] Take 1 tablet by mouth daily.       blood glucose test strips        calcium carbonate (OS-PATTI) 600 mg (1,500 mg) tablet [CALCIUM CARBONATE (OS-PATTI) 600 MG (1,500 MG) TABLET] Take 1,200 mg by mouth bedtime. Plus magnesium        cholecalciferol, vitamin D3, (VITAMIN D3) 2,000 unit cap [CHOLECALCIFEROL, VITAMIN D3, (VITAMIN D3) 2,000 UNIT CAP] Take 2,000 Units by mouth bedtime.        Continuous Glucose  (DEXCOM G7 ) TERESA        insulin lispro (HUMALOG) 100 unit/mL injection [INSULIN LISPRO (HUMALOG) 100 UNIT/ML INJECTION] 20 Units daily.       irbesartan-hydrochlorothiazide (AVALIDE) 150-12.5 MG tablet Take 1 tablet by mouth daily. 90 tablet 3     lysine 500 mg Tab [LYSINE 500 MG TAB] Take 500 mg by mouth 2 (two) times a day.       methylcellulose (CITRUCEL) powder Take 3 teaspoonful by mouth daily.       multivitamin therapeutic tablet [MULTIVITAMIN THERAPEUTIC TABLET] Take 1 tablet by mouth daily.         ALLERGIES  Allergies   Allergen Reactions     Nitroglycerin Other (See Comments)     Blood Pressure drops significantly.     Sulfa Antibiotics Dizziness     Dizziness and doesn't feel normal.       REVIEW OF SYSTEMS:  Pertinent positives and negatives noted in the HPI, all others negative.     SOCIAL HISTORY:  Social History     Socioeconomic History     Marital status: Single   Tobacco Use     Smoking status: Former     Current packs/day: 0.00     Types: Cigarettes     Quit date: 1985     Years since quittin.7     Passive exposure: Past     Smokeless tobacco: Never   Vaping Use     Vaping status: Never Used   Substance and Sexual Activity     Alcohol use: Yes     Alcohol/week: 5.0 standard drinks of alcohol     Drug use: Never     Sexual activity: Never     Social Drivers of Health     Financial  Resource Strain: Low Risk  (10/21/2024)    Financial Resource Strain      Within the past 12 months, have you or your family members you live with been unable to get utilities (heat, electricity) when it was really needed?: No   Food Insecurity: Low Risk  (10/21/2024)    Food Insecurity      Within the past 12 months, did you worry that your food would run out before you got money to buy more?: No      Within the past 12 months, did the food you bought just not last and you didn t have money to get more?: No   Transportation Needs: Low Risk  (10/21/2024)    Transportation Needs      Within the past 12 months, has lack of transportation kept you from medical appointments, getting your medicines, non-medical meetings or appointments, work, or from getting things that you need?: No   Physical Activity: Sufficiently Active (10/21/2024)    Exercise Vital Sign      Days of Exercise per Week: 7 days      Minutes of Exercise per Session: 60 min   Stress: No Stress Concern Present (10/21/2024)    [employer] [place] [employer] - Occupational Stress Questionnaire      Feeling of Stress : Not at all   Social Connections: Unknown (10/21/2024)    Social Connection and Isolation Panel [NHANES]      Frequency of Social Gatherings with Friends and Family: Once a week   Interpersonal Safety: Low Risk  (10/25/2024)    Interpersonal Safety      Do you feel physically and emotionally safe where you currently live?: Yes      Within the past 12 months, have you been hit, slapped, kicked or otherwise physically hurt by someone?: No      Within the past 12 months, have you been humiliated or emotionally abused in other ways by your partner or ex-partner?: No   Housing Stability: Low Risk  (10/21/2024)    Housing Stability      Do you have housing? : Yes      Are you worried about losing your housing?: No     PHYSICAL EXAM  A&O, NAD, well appearing  Normal affect  Nonlabored respirations on RA  Extremities well perfused    On  examination of her left hand today, she was in an ulnar gutter type hand splint which I removed.  Her skin was intact.  She has minimal tenderness to palpation over the proximal phalanx of her small finger.  Her motion is understandably stiff she is able to nearly fully flex her MP joints of her ring and small fingers but maybe only flex her PIP of both 20 degrees.  She has a normal cascade of her fingers when making a fist without any rotational deformity.    IMAGING  Personal review of left hand x-rays from 6/2/2025 demonstrates a minimally displaced pathologic fracture at the base of the small finger proximal phalanx through a radiolucent lesion on the radial border of the digit.  There is a sclerotic rim.  It does not however involve the joint surface which is still congruent.    ASSESSMENT  74-year-old right-hand-dominant female with a pathologic fracture of her left small finger proximal phalanx, likely enchondroma    PLAN  We reviewed the patient's radiographs which overall demonstrate a very likely benign lesion.  This should heal with nonoperative treatment.  Given that she is already stiff, no further splinting is indicated other than farzad taping.  We will also get her into hand therapy to work on finger motion.  She will follow-up in another 4 weeks with repeat radiographs.    Patient seen and evaluated with Dr. Joya.    Sourav Gallego MD  Orthopedic Surgery PGY4     I was present with the resident during the history and exam.  I discussed the case with the resident and agree with the findings as documented in the assessment and plan.    Again, thank you for allowing me to participate in the care of your patient.        Sincerely,        Shai Joya MD    Electronically signed

## 2025-06-11 NOTE — PROGRESS NOTES
ORTHOPEDIC SURGERY CLINIC NOTE    DATE OF INJURY: ~5/25/25  INJURY: left small finger fracture through bone cyst  MECHANISM: fall while hiking [place] in [place]     DATE OF VISIT: 06/11/25     HISTORY OF PRESENT ILLNESS  Sammi Guadalupe is a right hand dominant 74 year old female who presents for an evaluation of her left small finger.  Per above, she fell hiking recently and had pain but was able to finish her hiking and trip.  She was seen at an urgent care about a week ago where she had x-rays and the fracture was diagnosed.  She was placed in an ulnar gutter splint.  She does not have a history of other fractures but she does notably have history of breast cancer diagnosed in 2013 and completed 7 years of adjuvant hormonal therapy at end of April 2021.    PAST MEDICAL HISTORY  Past Medical History:   Diagnosis Date    ASHD (arteriosclerotic heart disease) 2012    CT scan, calcium score 428 lad artery deployment of a non-drug-eluting stent in the LAD    DVT (deep venous thrombosis) (H)     Family history of myocardial infarction        PAST SURGICAL HISTORY  Past Surgical History:   Procedure Laterality Date    BACK SURGERY      IR MISCELLANEOUS PROCEDURE  1/27/2014    IR PORT REMOVAL  2/6/2015    LAMINECTOMY AND MICRODISCECTOMY CERVICAL SPINE N/A     LAPAROSCOPIC HYSTERECTOMY N/A 12/12/2016    Procedure: ROBOTIC TOTAL LAPAROSCOPIC HYSTERECTOMY, BILATERAL SALPINGO-OOPHORECTOMY, CYSTOSCOPY;  Surgeon: Sohan Ramirez MD;  Location: [facility];  Service:     MASTECTOMY Bilateral     RELEASE CARPAL TUNNEL Bilateral        MEDICATIONS  Current Outpatient Medications   Medication Sig Dispense Refill    aspirin 81 MG tablet [ASPIRIN 81 MG TABLET] Take 81 mg by mouth daily.      atorvastatin (LIPITOR) 40 MG tablet Take 1 tablet (40 mg) by mouth at bedtime. 90 tablet 3    b complex vitamins tablet [B COMPLEX VITAMINS TABLET] Take 1 tablet by mouth daily.      blood glucose test strips       calcium carbonate  (OS-PATTI) 600 mg (1,500 mg) tablet [CALCIUM CARBONATE (OS-PATTI) 600 MG (1,500 MG) TABLET] Take 1,200 mg by mouth bedtime. Plus magnesium       cholecalciferol, vitamin D3, (VITAMIN D3) 2,000 unit cap [CHOLECALCIFEROL, VITAMIN D3, (VITAMIN D3) 2,000 UNIT CAP] Take 2,000 Units by mouth bedtime.       Continuous Glucose  (DEXCOM G7 ) TERESA       insulin lispro (HUMALOG) 100 unit/mL injection [INSULIN LISPRO (HUMALOG) 100 UNIT/ML INJECTION] 20 Units daily.      irbesartan-hydrochlorothiazide (AVALIDE) 150-12.5 MG tablet Take 1 tablet by mouth daily. 90 tablet 3    lysine 500 mg Tab [LYSINE 500 MG TAB] Take 500 mg by mouth 2 (two) times a day.      methylcellulose (CITRUCEL) powder Take 3 teaspoonful by mouth daily.      multivitamin therapeutic tablet [MULTIVITAMIN THERAPEUTIC TABLET] Take 1 tablet by mouth daily.         ALLERGIES  Allergies   Allergen Reactions    Nitroglycerin Other (See Comments)     Blood Pressure drops significantly.    Sulfa Antibiotics Dizziness     Dizziness and doesn't feel normal.       REVIEW OF SYSTEMS:  Pertinent positives and negatives noted in the HPI, all others negative.     SOCIAL HISTORY:  Social History     Socioeconomic History    Marital status: Single   Tobacco Use    Smoking status: Former     Current packs/day: 0.00     Types: Cigarettes     Quit date: 1985     Years since quittin.7     Passive exposure: Past    Smokeless tobacco: Never   Vaping Use    Vaping status: Never Used   Substance and Sexual Activity    Alcohol use: Yes     Alcohol/week: 5.0 standard drinks of alcohol    Drug use: Never    Sexual activity: Never     Social Drivers of Health     Financial Resource Strain: Low Risk  (10/21/2024)    Financial Resource Strain     Within the past 12 months, have you or your family members you live with been unable to get utilities (heat, electricity) when it was really needed?: No   Food Insecurity: Low Risk  (10/21/2024)    Food Insecurity     Within  the past 12 months, did you worry that your food would run out before you got money to buy more?: No     Within the past 12 months, did the food you bought just not last and you didn t have money to get more?: No   Transportation Needs: Low Risk  (10/21/2024)    Transportation Needs     Within the past 12 months, has lack of transportation kept you from medical appointments, getting your medicines, non-medical meetings or appointments, work, or from getting things that you need?: No   Physical Activity: Sufficiently Active (10/21/2024)    Exercise Vital Sign     Days of Exercise per Week: 7 days     Minutes of Exercise per Session: 60 min   Stress: No Stress Concern Present (10/21/2024)    [place] [employer] - Occupational Stress Questionnaire     Feeling of Stress : Not at all   Social Connections: Unknown (10/21/2024)    Social Connection and Isolation Panel [NHANES]     Frequency of Social Gatherings with Friends and Family: Once a week   Interpersonal Safety: Low Risk  (10/25/2024)    Interpersonal Safety     Do you feel physically and emotionally safe where you currently live?: Yes     Within the past 12 months, have you been hit, slapped, kicked or otherwise physically hurt by someone?: No     Within the past 12 months, have you been humiliated or emotionally abused in other ways by your partner or ex-partner?: No   Housing Stability: Low Risk  (10/21/2024)    Housing Stability     Do you have housing? : Yes     Are you worried about losing your housing?: No     PHYSICAL EXAM  A&O, NAD, well appearing  Normal affect  Nonlabored respirations on RA  Extremities well perfused    On examination of her left hand today, she was in an ulnar gutter type hand splint which I removed.  Her skin was intact.  She has minimal tenderness to palpation over the proximal phalanx of her small finger.  Her motion is understandably stiff she is able to nearly fully flex her MP joints of her ring and small  fingers but maybe only flex her PIP of both 20 degrees.  She has a normal cascade of her fingers when making a fist without any rotational deformity.    IMAGING  Personal review of left hand x-rays from 6/2/2025 demonstrates a minimally displaced pathologic fracture at the base of the small finger proximal phalanx through a radiolucent lesion on the radial border of the digit.  There is a sclerotic rim.  It does not however involve the joint surface which is still congruent.    ASSESSMENT  74-year-old right-hand-dominant female with a pathologic fracture of her left small finger proximal phalanx, likely enchondroma    PLAN  We reviewed the patient's radiographs which overall demonstrate a very likely benign lesion.  This should heal with nonoperative treatment.  Given that she is already stiff, no further splinting is indicated other than farzad taping.  We will also get her into hand therapy to work on finger motion.  She will follow-up in another 4 weeks with repeat radiographs.    Patient seen and evaluated with Dr. Joya.    Sourav Gallego MD  Orthopedic Surgery PGY4

## 2025-06-13 ENCOUNTER — TRANSFERRED RECORDS (OUTPATIENT)
Dept: ADMINISTRATIVE | Facility: CLINIC | Age: 75
End: 2025-06-13
Payer: MEDICARE

## 2025-06-17 PROBLEM — D12.6 ADENOMATOUS POLYP OF COLON: Status: ACTIVE | Noted: 2025-06-17

## 2025-06-17 NOTE — PROCEDURES
[facility]   [address], [address], [place] [address]     Patient Name: Sammi Guadalupe  Gender:  Female  Exam Date: 06/13/2025 Visit Number:  [id number]  Age: 74 Years YOB: 1950  Attending MD: Doc Rea MD Medical Record#:  [id number]    Procedure: Colonoscopy   Indications: Colorectal cancer screening      Referring MD: Referral Self  Primary MD:      Jeana WALDEN  Medications: Admitting Medications:   0.9% Normal Saline at TKO   Intra Procedure Medications:   Patient received monitored anesthesia care.     Complications: No immediate complications  ______________________________________________________________________________  Procedure:   An examination of the heart and lungs was performed and found to be within acceptable limits.  .  The patient was therefore deemed a reasonable candidate for endoscopy and sedation.   The risks and benefits of the procedure were explained to the patient.After obtaining informed consent, the patient received monitored anesthesia care and I passed the scope   without difficulty via the rectum to the cecum.  The appendiceal orifice and ic valve were identified.  The scope was retroflexed during the examination  The quality of the prep was good  (Miralax/Gatorade/2 tablets Bisacodyl/Magnesium Citrate).    This was a complete examination throughout the entire colon.    Findings:    Polyp location: sigmoid.  Quantity: 1.  Size: 6 mm.  Polyp shape:  sessile.         Maneuver: polypectomy was performed with a cold snare.       Removal:  complete.  Retrieval: complete.  Bleeding: none.    Significantly tortuous.  Location - entire colon.    Impression:  Polyp of colon, unspecified part of colon, unspecified type    Preliminary Plan:  The patient and their physician will receive a copy of the pathology report as well as pathology-based recommendations for future screening or surveillance.  Pathology Results:  A: COLON, SIGMOID, POLYP:            1. Tubular adenoma           2. Negative for high grade dysplasia           3. Per the colonoscopy report:               a. Polyp size: 6 mm               b. Resection: Complete               c. Retrieval: Complete      MICROSCOPIC  A: Performed     Electronically signed by: Gaetano Agudelo MD    Interpreted at [facility], [address] [place] [phone]    Orders    Instruction(s)/Education:  Instruction/Education Timeframe Assessment   Colon Cancer Prevention  K63.5   Colon Polyps  K63.5       Final Plan:  Repeat colonoscopy in 5 years.    We will attempt to contact you at appropriate intervals via [place] mail.  We may not be able to find you or contact you at that time, therefore you should know that the responsibility for following our recommendation rests with you.  If you don't hear from us at the time your procedure is due, please contact our office to schedule an appointment.  If your contact information should change, please contact our office so that we can update your record.      _Electronically signed by:___________________  Doc Rea MD                 06/13/2025    cc: Jeana WALDEN  cc: Gagan Sanchez MD

## 2025-06-18 ENCOUNTER — PATIENT OUTREACH (OUTPATIENT)
Dept: GASTROENTEROLOGY | Facility: CLINIC | Age: 75
End: 2025-06-18
Payer: MEDICARE

## 2025-06-27 PROBLEM — M84.442S: Status: ACTIVE | Noted: 2025-06-27

## 2025-07-11 ENCOUNTER — ANCILLARY PROCEDURE (OUTPATIENT)
Dept: GENERAL RADIOLOGY | Facility: CLINIC | Age: 75
End: 2025-07-11
Attending: ORTHOPAEDIC SURGERY
Payer: MEDICARE

## 2025-07-11 DIAGNOSIS — M84.442S: ICD-10-CM

## 2025-07-11 PROCEDURE — 73120 X-RAY EXAM OF HAND: CPT | Mod: LT | Performed by: RADIOLOGY

## 2025-07-13 ENCOUNTER — HEALTH MAINTENANCE LETTER (OUTPATIENT)
Age: 75
End: 2025-07-13